# Patient Record
Sex: FEMALE | Race: BLACK OR AFRICAN AMERICAN | Employment: FULL TIME | URBAN - METROPOLITAN AREA
[De-identification: names, ages, dates, MRNs, and addresses within clinical notes are randomized per-mention and may not be internally consistent; named-entity substitution may affect disease eponyms.]

---

## 2018-07-05 ENCOUNTER — HOSPITAL ENCOUNTER (OUTPATIENT)
Dept: MAMMOGRAPHY | Age: 58
End: 2018-07-05
Payer: COMMERCIAL

## 2018-07-05 ENCOUNTER — HOSPITAL ENCOUNTER (OUTPATIENT)
Dept: MAMMOGRAPHY | Age: 58
Discharge: HOME | End: 2018-07-07
Payer: COMMERCIAL

## 2018-07-05 ENCOUNTER — HOSPITAL ENCOUNTER (OUTPATIENT)
Dept: ULTRASOUND IMAGING | Age: 58
Discharge: HOME | End: 2018-07-05
Payer: COMMERCIAL

## 2018-07-05 DIAGNOSIS — N63.20 BREAST MASS, LEFT: ICD-10-CM

## 2018-07-05 DIAGNOSIS — N63.10 BREAST MASS, RIGHT: ICD-10-CM

## 2018-07-05 DIAGNOSIS — N63.20 LUMP OF LEFT BREAST: ICD-10-CM

## 2018-07-05 PROCEDURE — 77066 DX MAMMO INCL CAD BI: CPT

## 2018-07-05 PROCEDURE — 76642 ULTRASOUND BREAST LIMITED: CPT

## 2019-05-02 ENCOUNTER — INITIAL CONSULT (OUTPATIENT)
Dept: NEUROSURGERY | Age: 59
End: 2019-05-02
Payer: COMMERCIAL

## 2019-05-02 VITALS
DIASTOLIC BLOOD PRESSURE: 64 MMHG | WEIGHT: 293 LBS | HEIGHT: 62 IN | SYSTOLIC BLOOD PRESSURE: 139 MMHG | HEART RATE: 87 BPM | BODY MASS INDEX: 53.92 KG/M2

## 2019-05-02 DIAGNOSIS — M54.16 LUMBAR RADICULOPATHY: Primary | ICD-10-CM

## 2019-05-02 PROCEDURE — G8417 CALC BMI ABV UP PARAM F/U: HCPCS | Performed by: PHYSICIAN ASSISTANT

## 2019-05-02 PROCEDURE — G8427 DOCREV CUR MEDS BY ELIG CLIN: HCPCS | Performed by: PHYSICIAN ASSISTANT

## 2019-05-02 PROCEDURE — 99203 OFFICE O/P NEW LOW 30 MIN: CPT | Performed by: PHYSICIAN ASSISTANT

## 2019-05-02 PROCEDURE — 3017F COLORECTAL CA SCREEN DOC REV: CPT | Performed by: PHYSICIAN ASSISTANT

## 2019-05-02 PROCEDURE — 1036F TOBACCO NON-USER: CPT | Performed by: PHYSICIAN ASSISTANT

## 2019-05-02 RX ORDER — LEVOTHYROXINE SODIUM 0.03 MG/1
50 TABLET ORAL DAILY
COMMUNITY

## 2019-05-02 RX ORDER — LIDOCAINE 50 MG/G
1 PATCH TOPICAL DAILY
COMMUNITY

## 2019-05-02 RX ORDER — ALLOPURINOL 100 MG/1
200 TABLET ORAL DAILY
COMMUNITY

## 2019-05-02 RX ORDER — ALBUTEROL SULFATE 90 UG/1
2 AEROSOL, METERED RESPIRATORY (INHALATION) PRN
COMMUNITY

## 2019-05-02 RX ORDER — PANTOPRAZOLE SODIUM 40 MG/1
40 TABLET, DELAYED RELEASE ORAL 2 TIMES DAILY
COMMUNITY

## 2019-05-02 ASSESSMENT — ENCOUNTER SYMPTOMS
EYES NEGATIVE: 1
ALLERGIC/IMMUNOLOGIC NEGATIVE: 1
BACK PAIN: 1
RESPIRATORY NEGATIVE: 1
GASTROINTESTINAL NEGATIVE: 1

## 2019-05-02 NOTE — LETTER
[facility] Neurosurgery  [facility] [address]  Phone: [phone]  Fax: [phone]    Taryn Martinez MD        May 2, 2019       Patient: Gaby Regalado   MR Number: <[id number]>   YOB: 1960   Date of Visit: 5/2/2019       Dear Dr. Nasra Drake:    Thank you for the request for consultation for Daniel Goyal to me for the evaluation of back pain. Below are the relevant portions of my assessment and plan of care. Assessment:     59year old female with severe low back and left leg pain for the past 10+ years. Lumbar MRI reveals small L4-5 and L5-S1 disc herniations. No significant stenosis, lithesis or fracture. Plan: We will order a LE EMG and lumbar x-rays. She is currently not a surgical candidate. If you have questions, please do not hesitate to call me. I look forward to following Jose Carlton along with you.     Sincerely,        JATIN Houston    CC providers:  Cindy Heller, [address] [address]  VIA Mail

## 2019-05-02 NOTE — PATIENT INSTRUCTIONS

## 2020-11-13 ENCOUNTER — TELEPHONE ENCOUNTER (OUTPATIENT)
Dept: URBAN - METROPOLITAN AREA CLINIC 35 | Facility: CLINIC | Age: 60
End: 2020-11-13

## 2020-11-27 ENCOUNTER — HOSPITAL ENCOUNTER (OUTPATIENT)
Dept: MAMMOGRAPHY | Age: 60
Discharge: HOME | End: 2020-11-29
Payer: COMMERCIAL

## 2020-11-27 PROCEDURE — 77063 BREAST TOMOSYNTHESIS BI: CPT

## 2020-12-08 ENCOUNTER — HOSPITAL ENCOUNTER (OUTPATIENT)
Dept: ULTRASOUND IMAGING | Age: 60
Discharge: HOME | End: 2020-12-08
Payer: COMMERCIAL

## 2020-12-08 PROCEDURE — 76642 ULTRASOUND BREAST LIMITED: CPT

## 2021-01-22 ENCOUNTER — TELEPHONE ENCOUNTER (OUTPATIENT)
Dept: URBAN - METROPOLITAN AREA CLINIC 35 | Facility: CLINIC | Age: 61
End: 2021-01-22

## 2021-01-25 ENCOUNTER — OFFICE VISIT (OUTPATIENT)
Dept: URBAN - METROPOLITAN AREA CLINIC 33 | Facility: CLINIC | Age: 61
End: 2021-01-25

## 2021-01-25 VITALS
SYSTOLIC BLOOD PRESSURE: 100 MMHG | HEIGHT: 63 IN | WEIGHT: 180 LBS | HEART RATE: 72 BPM | BODY MASS INDEX: 31.89 KG/M2 | DIASTOLIC BLOOD PRESSURE: 80 MMHG | OXYGEN SATURATION: 98 %

## 2021-01-25 PROBLEM — 271840007 ABNORMAL FECES: Status: ACTIVE | Noted: 2021-01-25

## 2021-01-25 RX ORDER — SODIUM PICOSULFATE, MAGNESIUM OXIDE, AND ANHYDROUS CITRIC ACID 10; 3.5; 12 MG/160ML; G/160ML; G/160ML
AS DIRECTED LIQUID ORAL
Qty: 1 KIT | OUTPATIENT
Start: 2021-01-25

## 2021-01-25 NOTE — EXAM-MIGRATED EXAMINATIONS
General Examination : Medical assistant was in room  Patient has a small umbilical hernia.;     GENERAL APPEARANCE: - alert, in no acute distress, well developed, well nourished;   ORAL CAVITY: - mucosa moist;   THROAT: - Mallampati classII;   HEART: - no murmurs, regular rate and rhythm, S1, S2 normal;   LUNGS: - clear to auscultation bilaterally, good air movement, no wheezes, rales, rhonchi;   ABDOMEN: - bowel sounds present, no masses palpable, no organomegaly , no rebound tenderness, soft, nontender, nondistended;

## 2021-01-25 NOTE — HPI-MIGRATED HPI
;     Positive Occult Blood Test :                           60 year old female patient presents for POSTIVE FOBT  consult.. Patient admits this will be first colonoscopy. Patient denies family hx of colon cancer. Patient denies family hx of colon polyps. Patient admits normal bowel habits at this time. Patient admits to occasional constipation. She admits that Milk and Magnesia helps relieve her symptoms. Patient denies any current symptoms of rectal bleeding, melena or mucus. Patient denies any concerns at this time.   Outside Labs: 09/29/2020)  FOBT is POSITIVE.  Lipid Panel :  Cholester,ol total is HIGH at 333 Triglycerides is HIGH at 346 HDL is LOW at 43  LDL is HIGH at 231    MRI Abdomen : (12/14/2020) IMPRESSION: Several simple cysts and proteinaceous cysts of each kidney as decsribed which are stable since comparison study.  However a posteroior superior left renal likely proteinaceous or hemorrhagic region shows stable size but slight enhancement . Short term follow up is suggested with in 2-3 months .   Resolved right sided hydronephrosis and hydroureter since compalrison study.  Moderate periumbilical fatty hernia without strangulation or bowel involvement. ;

## 2021-02-02 ENCOUNTER — OFFICE VISIT (OUTPATIENT)
Dept: URBAN - METROPOLITAN AREA SURGERY CENTER 8 | Facility: SURGERY CENTER | Age: 61
End: 2021-02-02

## 2021-02-15 ENCOUNTER — DASHBOARD ENCOUNTERS (OUTPATIENT)
Age: 61
End: 2021-02-15

## 2021-02-16 ENCOUNTER — OFFICE VISIT (OUTPATIENT)
Dept: URBAN - METROPOLITAN AREA CLINIC 33 | Facility: CLINIC | Age: 61
End: 2021-02-16

## 2021-12-30 ENCOUNTER — HOSPITAL ENCOUNTER (OUTPATIENT)
Dept: MAMMOGRAPHY | Age: 61
Discharge: HOME | End: 2022-01-01
Payer: MEDICAID

## 2021-12-30 ENCOUNTER — HOSPITAL ENCOUNTER (OUTPATIENT)
Age: 61
Discharge: HOME | End: 2021-12-30
Payer: MEDICAID

## 2021-12-30 DIAGNOSIS — Z12.31 ENCOUNTER FOR SCREENING MAMMOGRAM FOR MALIGNANT NEOPLASM OF BREAST: ICD-10-CM

## 2021-12-30 LAB
ALBUMIN SERPL-MCNC: 4.6 G/DL (ref 3.5–5.2)
ALP BLD-CCNC: 101 U/L (ref 35–104)
ALT SERPL-CCNC: 20 U/L (ref 0–32)
ANION GAP SERPL CALCULATED.3IONS-SCNC: 10 MMOL/L (ref 7–16)
AST SERPL-CCNC: 16 U/L (ref 0–31)
BILIRUB SERPL-MCNC: 0.5 MG/DL (ref 0–1.2)
BUN BLDV-MCNC: 18 MG/DL (ref 6–23)
CALCIUM SERPL-MCNC: 9.6 MG/DL (ref 8.6–10.2)
CHLORIDE BLD-SCNC: 101 MMOL/L (ref 98–107)
CHOLESTEROL, FASTING: 164 MG/DL (ref 0–199)
CO2: 29 MMOL/L (ref 22–29)
CREAT SERPL-MCNC: 0.8 MG/DL (ref 0.5–1)
GFR AFRICAN AMERICAN: >60
GFR NON-AFRICAN AMERICAN: >60 ML/MIN/1.73
GLUCOSE FASTING: 115 MG/DL (ref 74–99)
HBA1C MFR BLD: 6.4 % (ref 4–5.6)
HCT VFR BLD CALC: 39 % (ref 34–48)
HDLC SERPL-MCNC: 76 MG/DL
HEMOGLOBIN: 11.5 G/DL (ref 11.5–15.5)
LDL CHOLESTEROL CALCULATED: 75 MG/DL (ref 0–99)
MCH RBC QN AUTO: 24.5 PG (ref 26–35)
MCHC RBC AUTO-ENTMCNC: 29.5 % (ref 32–34.5)
MCV RBC AUTO: 83.2 FL (ref 80–99.9)
PDW BLD-RTO: 15.2 FL (ref 11.5–15)
PLATELET # BLD: 249 E9/L (ref 130–450)
PMV BLD AUTO: 12.1 FL (ref 7–12)
POTASSIUM SERPL-SCNC: 4.7 MMOL/L (ref 3.5–5)
RBC # BLD: 4.69 E12/L (ref 3.5–5.5)
SODIUM BLD-SCNC: 140 MMOL/L (ref 132–146)
TOTAL PROTEIN: 8.1 G/DL (ref 6.4–8.3)
TRIGLYCERIDE, FASTING: 63 MG/DL (ref 0–149)
TSH SERPL DL<=0.05 MIU/L-ACNC: 3.19 UIU/ML (ref 0.27–4.2)
VLDLC SERPL CALC-MCNC: 13 MG/DL
WBC # BLD: 6.8 E9/L (ref 4.5–11.5)

## 2021-12-30 PROCEDURE — 80061 LIPID PANEL: CPT

## 2021-12-30 PROCEDURE — 80053 COMPREHEN METABOLIC PANEL: CPT

## 2021-12-30 PROCEDURE — 83036 HEMOGLOBIN GLYCOSYLATED A1C: CPT

## 2021-12-30 PROCEDURE — 77063 BREAST TOMOSYNTHESIS BI: CPT

## 2021-12-30 PROCEDURE — 36415 COLL VENOUS BLD VENIPUNCTURE: CPT

## 2021-12-30 PROCEDURE — 84443 ASSAY THYROID STIM HORMONE: CPT

## 2021-12-30 PROCEDURE — 85027 COMPLETE CBC AUTOMATED: CPT

## 2022-09-15 ENCOUNTER — INITIAL CONSULT (OUTPATIENT)
Dept: BARIATRICS/WEIGHT MGMT | Age: 62
End: 2022-09-15
Payer: MEDICARE

## 2022-09-15 ENCOUNTER — TELEPHONE (OUTPATIENT)
Dept: BARIATRICS/WEIGHT MGMT | Age: 62
End: 2022-09-15

## 2022-09-15 VITALS
HEART RATE: 73 BPM | WEIGHT: 293 LBS | HEIGHT: 62 IN | DIASTOLIC BLOOD PRESSURE: 83 MMHG | TEMPERATURE: 97.2 F | BODY MASS INDEX: 53.92 KG/M2 | SYSTOLIC BLOOD PRESSURE: 142 MMHG | RESPIRATION RATE: 20 BRPM

## 2022-09-15 DIAGNOSIS — E66.01 MORBID OBESITY DUE TO EXCESS CALORIES (HCC): Primary | ICD-10-CM

## 2022-09-15 DIAGNOSIS — E66.01 MORBID OBESITY (HCC): ICD-10-CM

## 2022-09-15 DIAGNOSIS — K21.9 GASTROESOPHAGEAL REFLUX DISEASE WITHOUT ESOPHAGITIS: ICD-10-CM

## 2022-09-15 PROBLEM — E11.9 DM (DIABETES MELLITUS) (HCC): Status: ACTIVE | Noted: 2022-09-15

## 2022-09-15 PROBLEM — J45.909 ASTHMA: Status: ACTIVE | Noted: 2022-09-15

## 2022-09-15 PROBLEM — I27.20 PULMONARY HYPERTENSION (HCC): Status: ACTIVE | Noted: 2022-09-15

## 2022-09-15 PROBLEM — M19.90 ARTHRITIS: Status: ACTIVE | Noted: 2022-09-15

## 2022-09-15 PROBLEM — I10 HTN (HYPERTENSION): Status: ACTIVE | Noted: 2022-09-15

## 2022-09-15 PROCEDURE — 99204 OFFICE O/P NEW MOD 45 MIN: CPT | Performed by: SURGERY

## 2022-09-15 PROCEDURE — 99202 OFFICE O/P NEW SF 15 MIN: CPT

## 2022-09-15 RX ORDER — SODIUM CHLORIDE, SODIUM LACTATE, POTASSIUM CHLORIDE, CALCIUM CHLORIDE 600; 310; 30; 20 MG/100ML; MG/100ML; MG/100ML; MG/100ML
INJECTION, SOLUTION INTRAVENOUS CONTINUOUS
Status: CANCELLED | OUTPATIENT
Start: 2022-09-15

## 2022-09-15 NOTE — TELEPHONE ENCOUNTER
Prior Authorization Form  DEMOGRAPHICS:    Patient Name:  Sheila Lung  Patient :  1960            Insurance:  Payor: Jayson Drummond / Plan: Sukhdev Pruitt ESSENTIAL/PLUS / Product Type: *No Product type* /   Insurance ID Number:    Payer/Plan Subscr  Sex Relation Sub. Ins. ID Effective Group Num   1. BCBS MEDICAREDebara Dux 1960 Female Self [id number]                                    [address]   2.  [place] -* BLAKE DENT 1960 Female Self [id number]                                    [address]         DIAGNOSIS & PROCEDURE:    Procedure/Operation: egd           CPT Code: 80700    Diagnosis:  gerd    ICD10 Code: k21.9    Location:  [facility]    Surgeon:  Priscila Irby INFORMATION:    Date: 10/17/22    Time:               Anesthesia:  MAC/TIVA                                                       Status:  Outpatient        Special Comments:         Electronically signed by Jeremy Whitney MA on 9/15/2022 at 11:00 AM

## 2022-09-15 NOTE — PROGRESS NOTES
Shashank Gonzalez  9/15/2022  [facility]    Initial Evaluation  History and Physical   EGD       CHIEF COMPLAINT: Morbid obesity, malnutrition, Type 2 Diabetes Mellitus, Hypertension, Asthma, Obstructive Sleep Apnea, and GERD    HISTORY OF PRESENT ILLNESS: Shashank Gonzalez is a morbidly-obese 59 y.o.  female, who weighs 300 lb (136.1 kg). She is 157 pounds over her ideal body weight. The Body mass index is 54.87 kg/m². She has multiple medical problems aggravated by her obesity. She wishes to have bariatric surgery so that she can lose a large amount of weight and keep the weight off. I have met with [facility] in the [facility] where we discussed the surgery in great detail and went over the risks and benefits. She has watched our informational video so she understands all of the extensive risks involved. She states that she understands all of the risks and wishes to proceed with the evaluation. Weight:  300 lb 9/15/2022  initial    Past medical history:     Cervical radiculopathy     DDD (degenerative disc disease), lumbar     Morbid obesity ([address]     Arthritis     Asthma     DM (diabetes mellitus) ([address]     HTN (hypertension)     Pulmonary hypertension ([address]    Past Surgical History:   Procedure Laterality Date    APPENDECTOMY  1985    CATARACT REMOVAL Bilateral 2017    [address] (CERVIX STATUS UNKNOWN)  2017    JOINT REPLACEMENT Left 2009    knee    NERVE BLOCK N/A 12/03/2014    cervical #1    NERVE BLOCK N/A 12/10/2014    cervical epidural #2    NERVE BLOCK  12/17/2014    cerical epidural #3      Allergies: Asa [aspirin], Ibuprofen, Norco [hydrocodone-acetaminophen], and Ultram [tramadol]     Home Medications  Prior to Visit Medications    Medication Sig Taking? Authorizing Provider   lidocaine (LIDODERM) 5 % Place 1 patch onto the skin daily 12 hours on, 12 hours off.  Yes Historical Provider, MD 142/83, pulse 73, temperature 97.2 °F (36.2 °C), temperature source Temporal, resp. rate 20, height 5' 2\" (1.575 m), weight 300 lb (136.1 kg). General appearance: alert, appears stated age and cooperative, does not ambulate easily  Head: Normocephalic, without obvious abnormality, atraumatic  Eyes: PERRL  Ears/mouth/throat:  Ears clear, mouth normal, throat no redness  Neck: no adenopathy, no JVD, supple, symmetrical, trachea midline and thyroid not enlarged  Lungs: clear to auscultation bilaterally  Heart: regular rate and rhythm  Abdomen: soft, non-tender; bowel sounds normal; no masses,  no organomegaly  Extremities: extremities normal, atraumatic, no cyanosis or edema  Skin: no open wounds    Assessment:  Morbid obesity with inability to keep the weight off with diet and exercise. She is interested in Laparoscopic Deisy-en- Y Gastric Bypass or Sleeve Gastrectomy. We discussed that our goal is to ameliorate the medical problems and not to obtain a specific body mass index. She understands the risks and benefits, and wishes to proceed with the evaluation. Plan:  EGD to check for hiatal hernias, ulcers and H. Pylori. Psych and dietary evaluation, medical and cardiac clearance. These patients often have vitamin deficiencies so I will do screening labs for malnutrition while we wait for insurance approval for the surgery.     Physician Signature: Electronically signed by Dr. Aisha Jiménez MD    Send copy of H&P to PCP, Chaim Fallon MD

## 2022-09-15 NOTE — PATIENT INSTRUCTIONS
What is the next step to proceed with weight loss surgery? Please be aware that any co-pays or deductibles may be requested prior to testing and / or procedures. You will need to schedule a psychological evaluation for weight loss surgery. Patients will be required to complete all psychological testing as required by the mental health provider. Patients must also follow all of the provider's recommendations before weight loss surgery can be scheduled. The evaluation must be done a standard way for weight loss surgery. We strongly recommend that you contact one of our preferred providers listed below to arrange this:      Kirk Wright, [facility]  [address] [place], [place]   [phone]    [facility] and [address]   [phone]    Dr. Benjamin Alfaro, PhD    [address] [place], [place]    [phone]      You will also need to plan on attending a 2 hour nutrition class at the [facility] prior to your surgery. We will schedule this for you when we schedule your surgery. Please remember to have your labs drawn 10 days prior to your first scheduled dietary appointment. Please remember, that while we will submit your case to insurance for surgery authorization, it is your responsibility to know if your plan covers weight loss surgery and keep up-to-date with changes to your insurance coverage. We will do everything possible to help you get approved for weight loss surgery, but cannot guarantee an approval.     Please note that you will not be submitted to your insurance company until all pre-operative testing requirements are met.

## 2022-09-27 ENCOUNTER — TELEPHONE (OUTPATIENT)
Dept: BARIATRICS/WEIGHT MGMT | Age: 62
End: 2022-09-27

## 2022-09-27 NOTE — TELEPHONE ENCOUNTER
SW returned call to PT but PT was unavailable. SW could not leave a message as v-mailbox  is  full.     DADA Griffiths

## 2022-10-14 ENCOUNTER — ANESTHESIA EVENT (OUTPATIENT)
Dept: ENDOSCOPY | Age: 62
End: 2022-10-14
Payer: MEDICARE

## 2022-10-14 NOTE — PROGRESS NOTES
[facility]                                                                                                                    PRE OP INSTRUCTIONS FOR  Jesse Paul        Date: 10/14/2022    Date of surgery: 10/17/22   Arrival Time: Hospital will call you between 5pm and 7pm with your final arrival time for surgery    Do not eat or drink anything after midnight prior to surgery. This includes no water, chewing gum, mints or ice chips. Take the following medications with a small sip of water on the morning of Surgery: Synthroid, Metoprolol     Diabetics may take evening dose of insulin but none after midnight. If you feel symptomatic or low blood sugar morning of surgery drink 1-2 ounces of apple juice only. Aspirin, Ibuprofen, Advil, Naproxen, Vitamin E and other Anti-inflammatory products should be stopped  before surgery  as directed by your physician. Take Tylenol only unless instructed otherwise by your surgeon. Check with your Doctor regarding stopping Plavix, Coumadin, Lovenox, Eliquis, Effient, or other blood thinners. Do not smoke,use illicit drugs and do not drink any alcoholic beverages 24 hours prior to surgery. You may brush your teeth the morning of surgery. DO NOT SWALLOW WATER    You MUST make arrangements for a responsible adult to take you home after your surgery. You will not be allowed to leave alone or drive yourself home. It is strongly suggested someone stay with you the first 24 hrs. Your surgery will be cancelled if you do not have a ride home. PEDIATRIC PATIENTS ONLY:  A parent/legal guardian must accompany a child scheduled for surgery and plan to stay at the hospital until the child is discharged. Please do not bring other children with you.     Please wear simple, loose fitting clothing to the hospital.  Giovanny Martinez not bring valuables (money, credit cards, checkbooks, etc.) Do not wear any makeup (including no eye makeup) or nail polish on your fingers or toes. DO NOT wear any jewelry or piercings on day of surgery. All body piercing jewelry must be removed. Shower the night before surgery with _x__Antibacterial soap /MARCUS WIPES________    TOTAL JOINT REPLACEMENT/HYSTERECTOMY PATIENTS ONLY---Remember to bring Blood Bank bracelet to the hospital on the day of surgery. If you have a Living Will and Durable Power of  for Healthcare, please bring in a copy. If appropriate bring crutches, inspirex, WALKER, CANE etc... Notify your Surgeon if you develop any illness between now and surgery time, cough, cold, fever, sore throat, nausea, vomiting, etc.  Please notify your surgeon if you experience dizziness, shortness of breath or blurred vision between now & the time of your surgery. If you have ___dentures, they will be removed before going to the OR; we will provide you a container. If you wear ___contact lenses or ___glasses, they will be removed; please bring a case for them. To provide excellent care visitors will be limited to 2 in the room at any given time. Please bring picture ID and insurance card. Sleep apnea patients need to bring CPAP AND SETTINGS to hospital on day of surgery. During flu season no children under the age of 15 are permitted in the hospital for the safety of all patients. Other                   Please call AMBULATORY CARE if you have any further questions.    [facility]     [address]

## 2022-10-17 ENCOUNTER — HOSPITAL ENCOUNTER (OUTPATIENT)
Age: 62
Setting detail: OUTPATIENT SURGERY
Discharge: HOME | End: 2022-10-17
Attending: SURGERY | Admitting: SURGERY
Payer: MEDICARE

## 2022-10-17 ENCOUNTER — ANESTHESIA (OUTPATIENT)
Dept: ENDOSCOPY | Age: 62
End: 2022-10-17
Payer: MEDICARE

## 2022-10-17 VITALS
OXYGEN SATURATION: 98 % | SYSTOLIC BLOOD PRESSURE: 123 MMHG | DIASTOLIC BLOOD PRESSURE: 63 MMHG | HEIGHT: 62 IN | HEART RATE: 71 BPM | TEMPERATURE: 97.1 F | BODY MASS INDEX: 53.92 KG/M2 | WEIGHT: 293 LBS | RESPIRATION RATE: 20 BRPM

## 2022-10-17 DIAGNOSIS — E66.01 MORBID OBESITY DUE TO EXCESS CALORIES (HCC): ICD-10-CM

## 2022-10-17 DIAGNOSIS — K21.00 GASTROESOPHAGEAL REFLUX DISEASE WITH ESOPHAGITIS, UNSPECIFIED WHETHER HEMORRHAGE: ICD-10-CM

## 2022-10-17 LAB
ALBUMIN SERPL-MCNC: 4.2 G/DL (ref 3.5–5.2)
ALP BLD-CCNC: 101 U/L (ref 35–104)
ALT SERPL-CCNC: 14 U/L (ref 0–32)
ANION GAP SERPL CALCULATED.3IONS-SCNC: 9 MMOL/L (ref 7–16)
AST SERPL-CCNC: 15 U/L (ref 0–31)
BILIRUB SERPL-MCNC: 0.4 MG/DL (ref 0–1.2)
BUN BLDV-MCNC: 15 MG/DL (ref 6–23)
CALCIUM SERPL-MCNC: 9 MG/DL (ref 8.6–10.2)
CHLORIDE BLD-SCNC: 101 MMOL/L (ref 98–107)
CHOLESTEROL, TOTAL: 168 MG/DL (ref 0–199)
CO2: 29 MMOL/L (ref 22–29)
CREAT SERPL-MCNC: 0.8 MG/DL (ref 0.5–1)
FERRITIN: 77 NG/ML
FOLATE: 13.6 NG/ML (ref 4.8–24.2)
GFR AFRICAN AMERICAN: >60
GFR NON-AFRICAN AMERICAN: >60 ML/MIN/1.73
GLUCOSE BLD-MCNC: 113 MG/DL (ref 74–99)
HBA1C MFR BLD: 6.5 % (ref 4–5.6)
HCT VFR BLD CALC: 37.1 % (ref 34–48)
HEMOGLOBIN: 10.9 G/DL (ref 11.5–15.5)
MCH RBC QN AUTO: 24.2 PG (ref 26–35)
MCHC RBC AUTO-ENTMCNC: 29.4 % (ref 32–34.5)
MCV RBC AUTO: 82.3 FL (ref 80–99.9)
METER GLUCOSE: 84 MG/DL (ref 74–99)
PDW BLD-RTO: 14.9 FL (ref 11.5–15)
PLATELET # BLD: 224 E9/L (ref 130–450)
PMV BLD AUTO: 12.6 FL (ref 7–12)
POTASSIUM SERPL-SCNC: 4 MMOL/L (ref 3.5–5)
PREALBUMIN: 17 MG/DL (ref 20–40)
RBC # BLD: 4.51 E12/L (ref 3.5–5.5)
SODIUM BLD-SCNC: 139 MMOL/L (ref 132–146)
TOTAL PROTEIN: 7.4 G/DL (ref 6.4–8.3)
TRIGL SERPL-MCNC: 77 MG/DL (ref 0–149)
VITAMIN B-12: 667 PG/ML (ref 211–946)
WBC # BLD: 5.5 E9/L (ref 4.5–11.5)

## 2022-10-17 PROCEDURE — 84134 ASSAY OF PREALBUMIN: CPT

## 2022-10-17 PROCEDURE — 84478 ASSAY OF TRIGLYCERIDES: CPT

## 2022-10-17 PROCEDURE — 82728 ASSAY OF FERRITIN: CPT

## 2022-10-17 PROCEDURE — 6360000002 HC RX W HCPCS: Performed by: NURSE ANESTHETIST, CERTIFIED REGISTERED

## 2022-10-17 PROCEDURE — 82465 ASSAY BLD/SERUM CHOLESTEROL: CPT

## 2022-10-17 PROCEDURE — 85027 COMPLETE CBC AUTOMATED: CPT

## 2022-10-17 PROCEDURE — 76937 US GUIDE VASCULAR ACCESS: CPT

## 2022-10-17 PROCEDURE — 80053 COMPREHEN METABOLIC PANEL: CPT

## 2022-10-17 PROCEDURE — 3700000000 HC ANESTHESIA ATTENDED CARE: Performed by: SURGERY

## 2022-10-17 PROCEDURE — 83036 HEMOGLOBIN GLYCOSYLATED A1C: CPT

## 2022-10-17 PROCEDURE — 82962 GLUCOSE BLOOD TEST: CPT

## 2022-10-17 PROCEDURE — 87081 CULTURE SCREEN ONLY: CPT

## 2022-10-17 PROCEDURE — 2580000003 HC RX 258: Performed by: SURGERY

## 2022-10-17 PROCEDURE — 7100000010 HC PHASE II RECOVERY - FIRST 15 MIN: Performed by: SURGERY

## 2022-10-17 PROCEDURE — 82607 VITAMIN B-12: CPT

## 2022-10-17 PROCEDURE — 36415 COLL VENOUS BLD VENIPUNCTURE: CPT

## 2022-10-17 PROCEDURE — 84425 ASSAY OF VITAMIN B-1: CPT

## 2022-10-17 PROCEDURE — 3609012400 HC EGD TRANSORAL BIOPSY SINGLE/MULTIPLE: Performed by: SURGERY

## 2022-10-17 PROCEDURE — 84630 ASSAY OF ZINC: CPT

## 2022-10-17 PROCEDURE — 82746 ASSAY OF FOLIC ACID SERUM: CPT

## 2022-10-17 PROCEDURE — 2709999900 HC NON-CHARGEABLE SUPPLY: Performed by: SURGERY

## 2022-10-17 PROCEDURE — 7100000011 HC PHASE II RECOVERY - ADDTL 15 MIN: Performed by: SURGERY

## 2022-10-17 PROCEDURE — 43239 EGD BIOPSY SINGLE/MULTIPLE: CPT | Performed by: SURGERY

## 2022-10-17 RX ORDER — SODIUM CHLORIDE, SODIUM LACTATE, POTASSIUM CHLORIDE, CALCIUM CHLORIDE 600; 310; 30; 20 MG/100ML; MG/100ML; MG/100ML; MG/100ML
INJECTION, SOLUTION INTRAVENOUS CONTINUOUS
Status: DISCONTINUED | OUTPATIENT
Start: 2022-10-17 | End: 2022-10-17 | Stop reason: HOSPADM

## 2022-10-17 RX ORDER — PROPOFOL 10 MG/ML
INJECTION, EMULSION INTRAVENOUS PRN
Status: DISCONTINUED | OUTPATIENT
Start: 2022-10-17 | End: 2022-10-17 | Stop reason: SDUPTHER

## 2022-10-17 RX ADMIN — SODIUM CHLORIDE, POTASSIUM CHLORIDE, SODIUM LACTATE AND CALCIUM CHLORIDE: 600; 310; 30; 20 INJECTION, SOLUTION INTRAVENOUS at 14:25

## 2022-10-17 RX ADMIN — PROPOFOL 150 MCG/KG/MIN: 10 INJECTION, EMULSION INTRAVENOUS at 14:33

## 2022-10-17 ASSESSMENT — PAIN DESCRIPTION - DESCRIPTORS: DESCRIPTORS: ACHING;SORE

## 2022-10-17 ASSESSMENT — PAIN SCALES - GENERAL: PAINLEVEL_OUTOF10: 0

## 2022-10-17 ASSESSMENT — PAIN - FUNCTIONAL ASSESSMENT: PAIN_FUNCTIONAL_ASSESSMENT: 0-10

## 2022-10-17 ASSESSMENT — LIFESTYLE VARIABLES: SMOKING_STATUS: 0

## 2022-10-17 NOTE — ANESTHESIA PRE PROCEDURE
Department of Anesthesiology  Preprocedure Note       Name:  Tre Ambriz   Age:  59 y.o.  :  1960                                          MRN:  [id number]         Date:  10/17/2022      Surgeon: Ramón Licona):  Shaq Mason MD    Procedure: Procedure(s):  EGD ESOPHAGOGASTRODUODENOSCOPY    Medications prior to admission:   Prior to Admission medications    Medication Sig Start Date End Date Taking? Authorizing Provider   lidocaine (LIDODERM) 5 % Place 1 patch onto the skin daily 12 hours on, 12 hours off. Historical Provider, MD   pantoprazole (PROTONIX) 40 MG tablet Take 40 mg by mouth 2 times daily    Historical Provider, MD   levothyroxine (SYNTHROID) 25 MCG tablet Take 50 mcg by mouth Daily    Historical Provider, MD   fluticasone 200 MCG/ACT AEPB Inhale 1 puff into the lungs daily  Patient not taking: Reported on 10/14/2022    Historical Provider, MD   albuterol sulfate  (90 Base) MCG/ACT inhaler Inhale 2 puffs into the lungs as needed for Wheezing    Historical Provider, MD   metFORMIN (GLUCOPHAGE) 500 MG tablet Take 500 mg by mouth 2 times daily (with meals)    Historical Provider, MD   allopurinol (ZYLOPRIM) 100 MG tablet Take 200 mg by mouth daily    Historical Provider, MD   hydrochlorothiazide (MICROZIDE) 12.5 MG capsule Take 12.5 mg by mouth daily. Historical Provider, MD   oxyCODONE-acetaminophen (PERCOCET) 5-325 MG per tablet Take 1 tablet by mouth every 6 hours as needed for Pain. Historical Provider, MD   montelukast (SINGULAIR) 10 MG tablet Take 10 mg by mouth nightly. Historical Provider, MD   metoprolol (LOPRESSOR) 25 MG tablet Take 25 mg by mouth 2 times daily. Historical Provider, MD   cyclobenzaprine (FLEXERIL) 10 MG tablet Take 10 mg by mouth 3 times daily as needed for Muscle spasms. Patient not taking: Reported on 10/14/2022    Historical Provider, MD   diclofenac sodium (VOLTAREN) 1 % GEL Apply 2 g topically 2 times daily.     Historical Provider, MD Current medications:    Current Facility-Administered Medications   Medication Dose Route Frequency Provider Last Rate Last Admin    lactated ringers infusion   IntraVENous Continuous Zion Vega MD           Allergies: Allergies   Allergen Reactions    Asa [Aspirin] Hives    Ibuprofen Other (See Comments)     Chest pain    Norco [Hydrocodone-Acetaminophen] Itching    Ultram [Tramadol] Itching       Problem List:    Patient Active Problem List   Diagnosis Code    Cervical radiculopathy M54.12    DDD (degenerative disc disease), lumbar M51.36    Morbid obesity ([address] E66.01    Arthritis M19.90    Asthma J45.909    DM (diabetes mellitus) ([address] E11.9    HTN (hypertension) I10    Pulmonary hypertension ([address] I27.20       Past Medical History:        Diagnosis Date    Arthritis     Asthma     DM (diabetes mellitus) ([address]     HTN (hypertension)     Morbid obesity due to excess calories ([address]     Obstructive sleep apnea syndrome     Pulmonary hypertension ([address]     Retention of urine        Past Surgical History:        Procedure Laterality Date    APPENDECTOMY  1985    CATARACT REMOVAL Bilateral 2017   [address] (CERVIX STATUS UNKNOWN)  2017    JOINT REPLACEMENT Left 2009    knee    NERVE BLOCK N/A 12/03/2014    cervical #1    NERVE BLOCK N/A 12/10/2014    cervical epidural #2    NERVE BLOCK  12/17/2014    cerical epidural #3       Social History:    Social History     Tobacco Use    Smoking status: Never    Smokeless tobacco: Never   Substance Use Topics    Alcohol use:  No                                Counseling given: Not Answered      Vital Signs (Current):   Vitals:    10/14/22 0822 10/17/22 1220   BP:  (!) 140/80   Pulse:  71   Resp:  20   Temp:  36 °C (96.8 °F)   TempSrc:  Temporal   SpO2:  95%   Weight: 300 lb (136.1 kg) (!) 302 lb 9.6 oz (137.3 kg)   Height:  5' 2\" (1.575 m) BP Readings from Last 3 Encounters:   10/17/22 (!) 140/80   09/15/22 (!) 142/83   05/02/19 139/64       NPO Status: Time of last liquid consumption: 0600 (sips of water with pills)                        Time of last solid consumption: 2200                        Date of last liquid consumption: 10/17/22                        Date of last solid food consumption: 10/16/22    BMI:   Wt Readings from Last 3 Encounters:   10/17/22 (!) 302 lb 9.6 oz (137.3 kg)   09/15/22 300 lb (136.1 kg)   05/02/19 296 lb (134.3 kg)     Body mass index is 55.35 kg/m². CBC:   Lab Results   Component Value Date/Time    WBC 5.5 10/17/2022 11:38 AM    RBC 4.51 10/17/2022 11:38 AM    HGB 10.9 10/17/2022 11:38 AM    HCT 37.1 10/17/2022 11:38 AM    MCV 82.3 10/17/2022 11:38 AM    RDW 14.9 10/17/2022 11:38 AM     10/17/2022 11:38 AM       CMP:   Lab Results   Component Value Date/Time     10/17/2022 11:38 AM    K 4.0 10/17/2022 11:38 AM     10/17/2022 11:38 AM    CO2 29 10/17/2022 11:38 AM    BUN 15 10/17/2022 11:38 AM    CREATININE 0.8 10/17/2022 11:38 AM    GFRAA >60 10/17/2022 11:38 AM    LABGLOM >60 10/17/2022 11:38 AM    GLUCOSE 113 10/17/2022 11:38 AM    PROT 7.4 10/17/2022 11:38 AM    CALCIUM 9.0 10/17/2022 11:38 AM    BILITOT 0.4 10/17/2022 11:38 AM    ALKPHOS 101 10/17/2022 11:38 AM    AST 15 10/17/2022 11:38 AM    ALT 14 10/17/2022 11:38 AM       POC Tests: No results for input(s): POCGLU, POCNA, POCK, POCCL, POCBUN, POCHEMO, POCHCT in the last 72 hours.     Coags: No results found for: PROTIME, INR, APTT    HCG (If Applicable): No results found for: PREGTESTUR, PREGSERUM, HCG, HCGQUANT     ABGs: No results found for: PHART, PO2ART, HNJ8YIP, OCZ6OQI, BEART, L7ZMILJP     Type & Screen (If Applicable):  No results found for: LABABO, LABRH    Drug/Infectious Status (If Applicable):  No results found for: HIV, HEPCAB    COVID-19 Screening (If Applicable): No results found for: COVID19        Anesthesia Evaluation  Patient summary reviewed and Nursing notes reviewed no history of anesthetic complications:   Airway: Mallampati: II  TM distance: >3 FB   Neck ROM: full  Mouth opening: > = 3 FB   Dental: normal exam         Pulmonary: breath sounds clear to auscultation  (+) sleep apnea: on noncompliant,  asthma: allergic asthma,                            Cardiovascular:  Exercise tolerance: poor (<4 METS),   (+) hypertension: moderate, pulmonary hypertension: moderate,         Rhythm: regular  Rate: normal                    Neuro/Psych:   (+) neuromuscular disease:,             GI/Hepatic/Renal:             Endo/Other:    (+) DiabetesType II DM, no interval change, , .                 Abdominal:             Vascular: Other Findings:           Anesthesia Plan      ASA 3       Induction: intravenous. Anesthetic plan and risks discussed with patient. Plan discussed with attending.                     ARMIN White - JERROD   10/17/2022

## 2022-10-17 NOTE — H&P
Aimee Rosenberg  10/17/2022  [facility]    History and Physical   EGD       CHIEF COMPLAINT: Morbid obesity, malnutrition, Type 2 Diabetes Mellitus, Hypertension, Asthma, Obstructive Sleep Apnea, and GERD    HISTORY OF PRESENT ILLNESS: Aimee Rosenberg is a morbidly-obese 59 y.o.  female, who weighs 300 lb (136.1 kg). She is 157 pounds over her ideal body weight. The Body mass index is 55.35 kg/m². She has multiple medical problems aggravated by her obesity. She wishes to have bariatric surgery so that she can lose a large amount of weight and keep the weight off. I have met with her in the [facility] where we discussed the surgery in great detail and went over the risks and benefits. She has watched our informational video so she understands all of the extensive risks involved. She states that she understands all of the risks and wishes to proceed with the evaluation. Weight:  300 lb 9/15/2022  initial    Past medical history:     Cervical radiculopathy     DDD (degenerative disc disease), lumbar     Morbid obesity ([address]     Arthritis     Asthma     DM (diabetes mellitus) ([address]     HTN (hypertension)     Pulmonary hypertension ([address]    Past Surgical History:   Procedure Laterality Date    APPENDECTOMY  1985    CATARACT REMOVAL Bilateral 2017    [address] (CERVIX STATUS UNKNOWN)  2017    JOINT REPLACEMENT Left 2009    knee    NERVE BLOCK N/A 12/03/2014    cervical #1    NERVE BLOCK N/A 12/10/2014    cervical epidural #2    NERVE BLOCK  12/17/2014    cerical epidural #3      Allergies: Asa [aspirin], Ibuprofen, Norco [hydrocodone-acetaminophen], and Ultram [tramadol]     Home Medications  Prior to Visit Medications    Medication Sig Taking? Authorizing Provider   lidocaine (LIDODERM) 5 % Place 1 patch onto the skin daily 12 hours on, 12 hours off.   Historical Provider, MD   pantoprazole (PROTONIX) 40 MG tablet Take 40 mg by mouth 2 times daily  Historical Provider, MD   levothyroxine (SYNTHROID) 25 MCG tablet Take 50 mcg by mouth Daily  Historical Provider, MD   fluticasone 200 MCG/ACT AEPB Inhale 1 puff into the lungs daily  Patient not taking: Reported on 10/14/2022  Historical Provider, MD   albuterol sulfate  (90 Base) MCG/ACT inhaler Inhale 2 puffs into the lungs as needed for Wheezing  Historical Provider, MD   metFORMIN (GLUCOPHAGE) 500 MG tablet Take 500 mg by mouth 2 times daily (with meals)  Historical Provider, MD   allopurinol (ZYLOPRIM) 100 MG tablet Take 200 mg by mouth daily  Historical Provider, MD   hydrochlorothiazide (MICROZIDE) 12.5 MG capsule Take 12.5 mg by mouth daily. Historical Provider, MD   oxyCODONE-acetaminophen (PERCOCET) 5-325 MG per tablet Take 1 tablet by mouth every 6 hours as needed for Pain. Historical Provider, MD   montelukast (SINGULAIR) 10 MG tablet Take 10 mg by mouth nightly. Historical Provider, MD   metoprolol (LOPRESSOR) 25 MG tablet Take 25 mg by mouth 2 times daily. Historical Provider, MD   cyclobenzaprine (FLEXERIL) 10 MG tablet Take 10 mg by mouth 3 times daily as needed for Muscle spasms. Patient not taking: Reported on 10/14/2022  Historical Provider, MD   diclofenac sodium (VOLTAREN) 1 % GEL Apply 2 g topically 2 times daily. Historical Provider, MD     Social History:   TOBACCO:   reports that she has never smoked. She has never used smokeless tobacco.  All smokers must join the free smoking cessation program and stop smoking for 3 months before having any Bariatric surgery. ETOH:    reports no history of alcohol use.    Family History   Problem Relation Age of Onset    Cancer Father     Diabetes Mother     Hypertension Mother      Review of Systems:  Psychiatric:  depression and anxiety  Respiratory: sleep apnea  Cardiovascular: htn  Gastrointestinal: gerd  Musculoskeletal: arthritis  All others reviewed, negative    Physical Exam:   VITALS: Blood pressure (!) 140/80, pulse 71, temperature 96.8 °F (36 °C), temperature source Temporal, resp. rate 20, height 5' 2\" (1.575 m), weight (!) 302 lb 9.6 oz (137.3 kg), SpO2 95 %. General appearance: alert, appears stated age and cooperative, does not ambulate easily  Head: Normocephalic, without obvious abnormality, atraumatic  Eyes: PERRL  Ears/mouth/throat:  Ears clear, mouth normal, throat no redness  Neck: no adenopathy, no JVD, supple, symmetrical, trachea midline and thyroid not enlarged  Lungs: clear to auscultation bilaterally  Heart: regular rate and rhythm  Abdomen: soft, non-tender; bowel sounds normal; no masses,  no organomegaly  Extremities: extremities normal, atraumatic, no cyanosis or edema  Skin: no open wounds    Assessment:  Morbid obesity, gerd on protonix     Plan:  EGD to check for hiatal hernias, ulcers and H. Pylori.      Physician Signature: Electronically signed by Dr. Sammy Herrera MD    Send copy of H&P to PCP, Leila Fountain MD

## 2022-10-17 NOTE — ANESTHESIA POSTPROCEDURE EVALUATION
Department of Anesthesiology  Postprocedure Note    Patient: Trinidad Goins  MRN: [id number]  YOB: 1960  Date of evaluation: 10/17/2022      Procedure Summary     Date: 10/17/22 Room / Location: [address] [place] [address] / [address] [place] [address]    Anesthesia Start: 5131 Anesthesia Stop: 5763    Procedure: EGD BIOPSY Diagnosis:       Gastroesophageal reflux disease with esophagitis, unspecified whether hemorrhage      (Gastroesophageal reflux disease with esophagitis, unspecified whether hemorrhage [K21.00])    Surgeons: Luis Carlos Holbrook MD Responsible Provider: Elidia Sheehan MD    Anesthesia Type: Not recorded ASA Status: 3          Anesthesia Type: No value filed.     Lester Phase I: Lester Score: 10    Lester Phase II: Lester Score: 10      Anesthesia Post Evaluation    Patient location during evaluation: PACU  Patient participation: complete - patient participated  Level of consciousness: awake  Airway patency: patent  Nausea & Vomiting: no nausea and no vomiting  Complications: no  Cardiovascular status: hemodynamically stable  Respiratory status: acceptable  Hydration status: stable

## 2022-10-17 NOTE — OP NOTE
[facility], [place]    Operative Report      SURGEON: Dr. Yuliana Toth MD    Surgical Assistant: Stephen Clark RN     PRE-OP DIAGNOSIS:  Patient Active Problem List:     Cervical radiculopathy     DDD (degenerative disc disease), lumbar     Morbid obesity ([facility]     Arthritis     Asthma     DM (diabetes mellitus) ([facility]     HTN (hypertension)     Pulmonary hypertension ([facility]     Gastroesophageal reflux disease with esophagitis     POSTOPERATIVE DIAGNOSES:    EGD with biopsy 10/17/2022 showed no esophagitis, no hiatal hernia, there was no gastritis, biopsy done to check for H.pylori, she does complain of acid reflux on Protonix, and the gallbladder is out. OPERATION:    EGD esophagogastroduodenoscopy    with biopsy                 [id number]     ANESTHESIA: LMAC. BLOOD LOSS: none  SPECIMEN: gastric biopsy for DELANEY    CONSENT AND INDICATIONS:  Kirk Barnes is a 59y.o. year old female who weighs 300 lb (136.1 kg) who needs to have an EGD as part of the work up for bariatric surgery. I have discussed with the patient the indication, alternatives, and the possible risks and/or complications of the planned procedure and the anesthesia methods. The patient and/or patient representative appear to understand and agree to proceed. Monitoring and Safety: Anaesthesia monitored vital signs, BP cuff, pulse oximetry, cardiac monitor and level of consciousness until recovered. Operation: The patient was placed on the table and sedated by Anaesthesia. Bite block was placed. A lubricated scope was easily passed into the upper esophagus and distal esophagus which had no esophagitis. The Z line was measured at 36 cm. The scope was passed into the stomach and down toward the pylorus. The antral mucosa was examined and there was no gastritis. Biopsy was taken to check for H. pylori.  The scope was passed through the pylorus into the duodenal bulb and distal duodenum which looked normal. The scope was pulled back to the stomach and retroflexed. There was no hiatal hernia, picture was taken. The scope was withdrawn. The patient tolerated the procedure well. Complications: none    Plan:   She may proceed with bariatric surgery. Electronically signed Dr. Isaias Ortiz M.D.

## 2022-10-17 NOTE — DISCHARGE INSTRUCTIONS
Upper GI Endoscopy: What to Expect at [address] had an upper GI endoscopy. Your doctor used a thin, lighted tube that bends to look at the inside of your esophagus, your stomach, and the first part of the small intestine, called the duodenum. After you have an endoscopy, you will stay at the hospital or clinic for 1 to 2 hours. This will allow the medicine to wear off. You will be able to go home after your doctor or nurse checks to make sure that you're not having any problems. You may have to stay overnight if you had treatment during the test. You may have a sore throat for a day or two after the test.  This care sheet gives you a general idea about what to expect after the test.  How can you care for yourself at home? Activity   Rest as much as you need to after you go home. You should be able to go back to your usual activities the day after the test.  Diet   Follow your doctor's directions for eating after the test.  Drink plenty of fluids (unless your doctor has told you not to). Medications   If you have a sore throat the day after the test, use an over-the-counter spray to numb your throat. Follow-up care is a key part of your treatment and safety. Be sure to make and go to all appointments, and call your doctor if you are having problems. It's also a good idea to know your test results and keep a list of the medicines you take. When should you call for help? Call 911 anytime you think you may need emergency care. For example, call if:    You passed out (lost consciousness). You have trouble breathing. You pass maroon or bloody stools. Call your doctor now or seek immediate medical care if:    You have pain that does not get better after your take pain medicine. You have new or worse belly pain. You have blood in your stools. You are sick to your stomach and cannot keep fluids down. You have a fever. You cannot pass stools or gas.    Watch closely for changes in your health, and be sure to contact your doctor if:    Your throat still hurts after a day or two. You do not get better as expected. Where can you learn more? Go to [email or web address] org and sign in to your [employer] account. Enter H779 in the [employer] box to learn more about \"Upper GI Endoscopy: What to Expect at Home. \"     If you do not have an account, please click on the \"Sign Up Now\" link. Current as of: June 6, 2022               Content Version: 13.4  © 7462-5410 Healthwise, Incorporated. Care instructions adapted under license by [facility] [place] ([facility]). If you have questions about a medical condition or this instruction, always ask your healthcare professional. [address] any warranty or liability for your use of this information.

## 2022-10-17 NOTE — ANESTHESIA PRE PROCEDURE
Department of Anesthesiology  Preprocedure Note       Name:  Kristyn Hu   Age:  59 y.o.  :  1960                                          MRN:  [id number]         Date:  10/17/2022      Surgeon: Asha Benavides):  Justyna Curran MD    Procedure: Procedure(s):  EGD ESOPHAGOGASTRODUODENOSCOPY    Medications prior to admission:   Prior to Admission medications    Medication Sig Start Date End Date Taking? Authorizing Provider   lidocaine (LIDODERM) 5 % Place 1 patch onto the skin daily 12 hours on, 12 hours off. Historical Provider, MD   pantoprazole (PROTONIX) 40 MG tablet Take 40 mg by mouth 2 times daily    Historical Provider, MD   levothyroxine (SYNTHROID) 25 MCG tablet Take 50 mcg by mouth Daily    Historical Provider, MD   fluticasone 200 MCG/ACT AEPB Inhale 1 puff into the lungs daily  Patient not taking: Reported on 10/14/2022    Historical Provider, MD   albuterol sulfate  (90 Base) MCG/ACT inhaler Inhale 2 puffs into the lungs as needed for Wheezing    Historical Provider, MD   metFORMIN (GLUCOPHAGE) 500 MG tablet Take 500 mg by mouth 2 times daily (with meals)    Historical Provider, MD   allopurinol (ZYLOPRIM) 100 MG tablet Take 200 mg by mouth daily    Historical Provider, MD   hydrochlorothiazide (MICROZIDE) 12.5 MG capsule Take 12.5 mg by mouth daily. Historical Provider, MD   oxyCODONE-acetaminophen (PERCOCET) 5-325 MG per tablet Take 1 tablet by mouth every 6 hours as needed for Pain. Historical Provider, MD   montelukast (SINGULAIR) 10 MG tablet Take 10 mg by mouth nightly. Historical Provider, MD   metoprolol (LOPRESSOR) 25 MG tablet Take 25 mg by mouth 2 times daily. Historical Provider, MD   cyclobenzaprine (FLEXERIL) 10 MG tablet Take 10 mg by mouth 3 times daily as needed for Muscle spasms. Patient not taking: Reported on 10/14/2022    Historical Provider, MD   diclofenac sodium (VOLTAREN) 1 % GEL Apply 2 g topically 2 times daily.     Historical Provider, MD Current medications:    No current facility-administered medications for this encounter. Allergies: Allergies   Allergen Reactions    Asa [Aspirin] Hives    Ibuprofen Other (See Comments)     Chest pain    Norco [Hydrocodone-Acetaminophen] Itching    Ultram [Tramadol] Itching       Problem List:    Patient Active Problem List   Diagnosis Code    Cervical radiculopathy M54.12    DDD (degenerative disc disease), lumbar M51.36    Morbid obesity ([address] E66.01    Arthritis M19.90    Asthma J45.909    DM (diabetes mellitus) ([address] E11.9    HTN (hypertension) I10    Pulmonary hypertension ([address] I27.20       Past Medical History:        Diagnosis Date    Arthritis     Asthma     DM (diabetes mellitus) ([address]     HTN (hypertension)     Morbid obesity due to excess calories ([address]     Obstructive sleep apnea syndrome     Pulmonary hypertension ([address]     Retention of urine        Past Surgical History:        Procedure Laterality Date    APPENDECTOMY  1985    CATARACT REMOVAL Bilateral 2017   [address] (CERVIX STATUS UNKNOWN)  2017    JOINT REPLACEMENT Left 2009    knee    NERVE BLOCK N/A 12/03/2014    cervical #1    NERVE BLOCK N/A 12/10/2014    cervical epidural #2    NERVE BLOCK  12/17/2014    cerical epidural #3       Social History:    Social History     Tobacco Use    Smoking status: Never    Smokeless tobacco: Never   Substance Use Topics    Alcohol use:  No                                Counseling given: Not Answered      Vital Signs (Current):   Vitals:    10/14/22 0822   Weight: 300 lb (136.1 kg)                                              BP Readings from Last 3 Encounters:   09/15/22 (!) 142/83   05/02/19 139/64   01/28/15 120/70       NPO Status:                                                                                 BMI:   Wt Readings from Last 3 Encounters:   10/14/22 300 lb (136.1 kg)   09/15/22 300 lb (136.1 kg)   05/02/19 296 lb (134.3 kg)     Body mass index is 54.87 kg/m². CBC:   Lab Results   Component Value Date/Time    WBC 6.8 12/30/2021 12:04 PM    RBC 4.69 12/30/2021 12:04 PM    HGB 11.5 12/30/2021 12:04 PM    HCT 39.0 12/30/2021 12:04 PM    MCV 83.2 12/30/2021 12:04 PM    RDW 15.2 12/30/2021 12:04 PM     12/30/2021 12:04 PM       CMP:   Lab Results   Component Value Date/Time     12/30/2021 12:04 PM    K 4.7 12/30/2021 12:04 PM     12/30/2021 12:04 PM    CO2 29 12/30/2021 12:04 PM    BUN 18 12/30/2021 12:04 PM    CREATININE 0.8 12/30/2021 12:04 PM    GFRAA >60 12/30/2021 12:04 PM    LABGLOM >60 12/30/2021 12:04 PM    PROT 8.1 12/30/2021 12:04 PM    CALCIUM 9.6 12/30/2021 12:04 PM    BILITOT 0.5 12/30/2021 12:04 PM    ALKPHOS 101 12/30/2021 12:04 PM    AST 16 12/30/2021 12:04 PM    ALT 20 12/30/2021 12:04 PM       POC Tests: No results for input(s): POCGLU, POCNA, POCK, POCCL, POCBUN, POCHEMO, POCHCT in the last 72 hours.     Coags: No results found for: PROTIME, INR, APTT    HCG (If Applicable): No results found for: PREGTESTUR, PREGSERUM, HCG, HCGQUANT     ABGs: No results found for: PHART, PO2ART, HTR1PNJ, UPH4GXI, BEART, L7MADCTB     Type & Screen (If Applicable):  No results found for: LABABO, LABRH    Drug/Infectious Status (If Applicable):  No results found for: HIV, HEPCAB    COVID-19 Screening (If Applicable): No results found for: COVID19        Anesthesia Evaluation  Patient summary reviewed no history of anesthetic complications:   Airway: Mallampati: II  TM distance: >3 FB   Neck ROM: full  Mouth opening: > = 3 FB   Dental: normal exam         Pulmonary: breath sounds clear to auscultation  (+) sleep apnea: on CPAP,  asthma:     (-) not a current smoker                           Cardiovascular:    (+) hypertension:,         Rhythm: regular  Rate: normal                    Neuro/Psych:   (+) neuromuscular disease:,              ROS comment: Cervical radiculopathy  DDD

## 2022-10-19 LAB — CLOTEST: NORMAL

## 2022-10-20 LAB — ZINC: 93.8 UG/DL (ref 60–120)

## 2022-10-24 LAB — VITAMIN B1 WHOLE BLOOD: 94 NMOL/L (ref 70–180)

## 2022-10-25 ENCOUNTER — INITIAL CONSULT (OUTPATIENT)
Dept: BARIATRICS/WEIGHT MGMT | Age: 62
End: 2022-10-25

## 2022-10-25 VITALS — HEIGHT: 62 IN | BODY MASS INDEX: 53.92 KG/M2 | WEIGHT: 293 LBS

## 2022-10-25 DIAGNOSIS — Z71.3 NUTRITIONAL COUNSELING: ICD-10-CM

## 2022-10-25 DIAGNOSIS — Z00.8 NUTRITIONAL ASSESSMENT: Primary | ICD-10-CM

## 2022-10-25 PROCEDURE — 99999 PR OFFICE/OUTPT VISIT,PROCEDURE ONLY: CPT | Performed by: SURGERY

## 2022-10-25 NOTE — PROGRESS NOTES
[address] [facility]:  Initial Nutrition Consultation    Today's Date: 10/25/2022  Patients Name:Yanet Jones     Height: 5' 2\" (1.575 m)   Weight: (!) 301 lb (136.5 kg)   IBW: 143 lbs  %IBW: 210%  Excess Weight: 158 lbs  BMI: Body mass index is 55.05 kg/m². Subjective Information:   Patient has tried multiple means of weight loss including diet and exercise in the past and has been unable to maintain a healthy weight. Pt states he / she has tried the following  - Cabbage Soup Diet, Weight Watchers, Portion Controlled Diet and Walking. Patients overall goal is to be able to lose weight with diet and exercise by changing lifestyle, habits and maintain a healthy weight for a lifetime. Are your currently Diabetic  - Yes  Type 2 Diabetic  - Yes  Medication to Control Diabetes  - Januvia and Metformin    Most successful diet in the past? Walking Daily  Length of time patient was on the diet listed above? 1989  Weight lost on the diet listed above? 50 lbs  Patient stated he / she maintained his / her weight for the following time? 15 years    Patient takes the following vitamins, minerals and dietary supplements? No - I do not currently take a daily complete multi-vitamin. Les Don / Dalton Brewer has educated the patient that we recommend in order to decrease infection rates patient start a daily complete multi-vitamin from now until surgery if proceeding with surgery. Patient consumes the following beverage daily? Coke and Water    Patient states he / she has the following problems:  no - Eating  no - Chewing  yes - Swallowing - No Etiology  no - Nausea  no - Vomiting  yes - Diarrhea - Etiology - Metformin  yes - Constipation  yes - Hair Changes  yes - Missing Teeth  no - Wears Dentures  Food Allergies: no  -     Pt identifies his / her eating habits as the following:     Eats some fruits and or vegetables daily. Tries to eat healthy - whole wheat bread.  Moderate to high intake of empty-calorie foods (sweets, salty / fatty foods). Yes - Past medically assisted weight loss history reviewed. Yes - Provided education regarding the basic role of nutrition in junction with Bariatric Surgery. Yes - Provided overview of required dietary and behavioral changes pre and post operatively. Yes - Stated / reinforced that changes in dietary behaviors are critical to successful, long term weight Loss. Patient is aware that in order to proceed with bariatric surgery he / she will need to follow a low-fat diet prior to surgery. Patient is aware that bariatric surgery is a lifetime change that will require the patient to follow a low-fat, low-calorie, low-sugar, portion controlled diet with at least 30 minutes of physical activity daily. Patient is aware that certain foods may not be tolerated after bariatric surgery and certain foods will need avoided all together. Pt is aware supplementation of certain micro nutrients is lifelong along with the use of tracking both macro and micro nutrient intake daily after weight loss surgery. Pt is part of a comprehensive surgical weight loss program that is educating the patient on proper food choices, meal planning, portion control and label reading for use both before and after weight loss surgery. Pt is able to set measurable attainable nutrition goals that reinforce desired post-operative eating patterns when it comes to family, home based, or work settings. Mindful eating skills are being developed by the patient and how to identify the patients sense of hunger and fullness are being addressed. [address] / LD feels that this patient knowledge / readiness to make appropriate diet / lifestyle changes assessed to be? - Good    RD / LD feels this patients expected adherence for post surgical diet? - Good    Patient was instructed and signed consents on a low-fat diet and required supplementation at initial consult.     Comments: Patient is able to verbalize diet concepts for bariatric surgery. Patient is aware diet concepts will be reinforced at monthly pre-op weight loss counseling appointments. Pt will also attend a 3-hour nutrition education class once scheduled for surgery . RD / LD will monitor progress.

## 2022-10-25 NOTE — PATIENT INSTRUCTIONS
[facility]  Dietary Initial Appointment Patient Instructions    By: Stewart Snyder RD / ENEDINA  [phone]      At your initial dietary appointment you have received the following information packets:    Please be aware at each visit you have been instructed that in order for your insurance company to approve your surgery you must show a consistent weight loss of 2 lbs or greater at each visit. We can not guarantee an approval by your insurance company we can only provide the information given to us it is up to you the patient to show compliancy to your insurance company. If you do gain weight during your supervised weight loss counseling sessions insurance companies starting in 2018 are denying patients for not showing consistent weight loss results when part of a supervised weight loss counseling program. Pt was instructed on 10/25/22. Pt verbalized understanding. Low-Fat Diet  - Please be sure to begin your low-fat diet from today's date until your scheduled surgery date. If you are not at goal weight by your history and physical appointment with your surgeon your surgery will be cancelled. Goal Weight: 285 lbs //  BMI: 52.1  Low-Fat Diet Contract - Patient Acknowledge and Signed  Supplement List - Please be sure to be saving to purchase your 3 month supply of supplements. If you do not bring supplements to your history and physical appointment your surgery will be cancelled. Supplement Contract - Patient Acknowledge and Signed  Please be sure to take a daily Multi-vitamin from now until surgery to reduce your incidence of infection. If your insurance company requires additional dietary counseling you will be scheduled to see the dietitian the following month. If your psychological evaluation is completed and all your dietary requirements for your individual insurance company have been completed you will be submitted to insurance.  Please make sure to check with us to see if we have received your psychological evaluation. Please be aware that any co-pays or deductibles may be requested prior to testing and / or procedures. You will need to schedule a psychological evaluation for weight loss surgery. Patients will be required to complete all psychological testing as required by the psychologist. Patients must follow all of the psychologist's recommendations before weight loss surgery can be scheduled. The evaluation must be done a standard way for weight loss surgery. We strongly recommend that you contact one of our preferred providers listed below to arrange this:    Nandini Estrada, [facility]                                                              [address] [place] [address]                                                                                      [phone]     Carla Sears [address] [place] [address]                                                                                                [phone]        Dr. Margarette Del Valle, PhD                         Christina Neumann. [place], [place]                                                                                              [phone]     You will also need to plan on attending a 2 hour nutrition class at the [facility] prior to your surgery. We will schedule this for you when we schedule your surgery. Please remember to have your labs drawn prior to your scheduled appointment to review the results of your testing. Please remember, that while we will submit your case to insurance for surgery authorization, it is your responsibility to know if your plan covers weight loss surgery and keep up-to-date with changes to your insurance coverage.   We will do everything possible to help you get approved for weight loss surgery, but cannot guarantee an approval.      Please note that you will not be submitted to your insurance company until all   pre-operative testing requirements are met. Please remember you need to schedule to attend one Support Group meeting held at the [facility] before surgery. This one meeting is mandatory. You can attend as many support group meetings before and after surgery. Support group meetings are held at the [facility] from 6:00 - 8:00pm 1st, and 3rd Tuesday of every month. See dates listed below. Each individual person has his / her own medical requirements that need fulfilled before being able to schedule bariatric surgery . Please finalize these requirements by contacting our Bariatric Nurse at [phone].

## 2022-11-11 ENCOUNTER — OFFICE VISIT (OUTPATIENT)
Dept: BARIATRICS/WEIGHT MGMT | Age: 62
End: 2022-11-11
Payer: MEDICARE

## 2022-11-11 VITALS
BODY MASS INDEX: 53.92 KG/M2 | SYSTOLIC BLOOD PRESSURE: 172 MMHG | HEIGHT: 62 IN | HEART RATE: 81 BPM | WEIGHT: 293 LBS | RESPIRATION RATE: 20 BRPM | DIASTOLIC BLOOD PRESSURE: 96 MMHG | TEMPERATURE: 97.2 F

## 2022-11-11 DIAGNOSIS — K21.9 GASTROESOPHAGEAL REFLUX DISEASE WITHOUT ESOPHAGITIS: ICD-10-CM

## 2022-11-11 DIAGNOSIS — E66.01 MORBID OBESITY (HCC): Primary | ICD-10-CM

## 2022-11-11 DIAGNOSIS — Z71.3 NUTRITIONAL COUNSELING: ICD-10-CM

## 2022-11-11 PROCEDURE — 99213 OFFICE O/P EST LOW 20 MIN: CPT | Performed by: NURSE PRACTITIONER

## 2022-11-11 PROCEDURE — 3074F SYST BP LT 130 MM HG: CPT | Performed by: NURSE PRACTITIONER

## 2022-11-11 PROCEDURE — 99211 OFF/OP EST MAY X REQ PHY/QHP: CPT

## 2022-11-11 PROCEDURE — 3078F DIAST BP <80 MM HG: CPT | Performed by: NURSE PRACTITIONER

## 2022-11-11 NOTE — PATIENT INSTRUCTIONS
Goals:  Increase physical activity  Cut down on snacks  She has cut down to 1/2 can coke per day, will continue to try to do that  Keep a food diary and write down anything you put in your mouth to eat and drink    Patient was instructed on types of protein supplements and usage of protein supplements before and after surgery. The goal of protein intake after bariatric surgery is 60-80 grams daily. Patient was able to verbalize the instruction of how to take the supplements and the importance of their use before and after surgery. What is the next step to proceed with weight loss surgery? Please be aware that any co-pays or deductibles may be requested prior to testing and / or procedures. You will need to schedule a psychological evaluation for weight loss surgery. Patients will be required to complete all psychological testing as required by the mental health provider. Patients must also follow all of the provider's recommendations before weight loss surgery can be scheduled. The evaluation must be done a standard way for weight loss surgery. We strongly recommend that you contact one of our preferred providers listed below to arrange this:      Kirk Begum, [facility]  [address] [place], [place]   [phone]    [facility] and [address]   [phone]    Dr. Patrick Mathews, PhD    Elizabeth Tamayo. [place], [place]    [phone]      You will also need to plan on attending a 2 hour nutrition class at the [facility] prior to your surgery. We will schedule this for you when we schedule your surgery. Please remember to have your labs drawn 10 days prior to your first scheduled dietary appointment. Please remember, that while we will submit your case to insurance for surgery authorization, it is your responsibility to know if your plan covers weight loss surgery and keep up-to-date with changes to your insurance coverage.   We will do everything possible to help you get approved for weight loss surgery, but cannot guarantee an approval.     Please note that you will not be submitted to your insurance company until all pre-operative testing requirements are met.

## 2022-11-11 NOTE — PROGRESS NOTES
Trinidad Goins  11/11/2022  Bariatric Weight loss appointment for Obesity  Nutrition Education Session      Subjective:   Trinidad Goins is a 59 y.o. female here for pre-surgical dietary education today. Patient is accompanied by herself at today's visit. Patient reports that they are doing good following their previous dietary education. Questions today include none. Weight=(!) 306 lb (138.8 kg)  Today's weight represents a total weight loss to date of +6 pounds. Today we will discuss the topics of weight loss prior to surgery. Patients previous 24 hour dietary recall includes:  Breakfast: none  Snack: chocolate  Lunch: none  Snack: lays potato chips  Dinner: pork chop, green beans, baked potato with butter  Snack: hard boiled egg  Water intake: 48 ounces  Other beverages:1/2 can regular coke  Exercise: none    She does not exercise on a regular basis. She reports that she has picked up some of the protein powder. Has not tried the VLDL diet as of yet. Prior to Admission medications    Medication Sig Start Date End Date Taking? Authorizing Provider   lidocaine (LIDODERM) 5 % Place 1 patch onto the skin daily 12 hours on, 12 hours off. Yes Historical Provider, MD   pantoprazole (PROTONIX) 40 MG tablet Take 40 mg by mouth 2 times daily   Yes Historical Provider, MD   levothyroxine (SYNTHROID) 25 MCG tablet Take 50 mcg by mouth Daily   Yes Historical Provider, MD   fluticasone 200 MCG/ACT AEPB Inhale 1 puff into the lungs daily   Yes Historical Provider, MD   albuterol sulfate  (90 Base) MCG/ACT inhaler Inhale 2 puffs into the lungs as needed for Wheezing   Yes Historical Provider, MD   metFORMIN (GLUCOPHAGE) 500 MG tablet Take 500 mg by mouth 2 times daily (with meals)   Yes Historical Provider, MD   allopurinol (ZYLOPRIM) 100 MG tablet Take 200 mg by mouth daily   Yes Historical Provider, MD   hydrochlorothiazide (MICROZIDE) 12.5 MG capsule Take 12.5 mg by mouth daily.    Yes Historical Provider, MD   oxyCODONE-acetaminophen (PERCOCET) 5-325 MG per tablet Take 1 tablet by mouth every 6 hours as needed for Pain. Yes Historical Provider, MD   montelukast (SINGULAIR) 10 MG tablet Take 10 mg by mouth nightly. Yes Historical Provider, MD   metoprolol (LOPRESSOR) 25 MG tablet Take 25 mg by mouth 2 times daily. Yes Historical Provider, MD   cyclobenzaprine (FLEXERIL) 10 MG tablet Take 10 mg by mouth 3 times daily as needed for Muscle spasms   Yes Historical Provider, MD   diclofenac sodium (VOLTAREN) 1 % GEL Apply 2 g topically 2 times daily.    Yes Historical Provider, MD         Allergies   Allergen Reactions    Asa [Aspirin] Hives    Ibuprofen Other (See Comments)     Chest pain    Norco [Hydrocodone-Acetaminophen] Itching    Ultram [Tramadol] Itching     Past Medical History:   Diagnosis Date    Arthritis     Asthma     DM (diabetes mellitus) ([address]     HTN (hypertension)     Morbid obesity due to excess calories ([facility]     Obstructive sleep apnea syndrome     Pulmonary hypertension ([facility]     Retention of urine        Past Surgical History:   Procedure Laterality Date    APPENDECTOMY  1985    CATARACT REMOVAL Bilateral 2017    [address] (CERVIX STATUS UNKNOWN)  2017    JOINT REPLACEMENT Left 2009    knee    NERVE BLOCK N/A 12/03/2014    cervical #1    NERVE BLOCK N/A 12/10/2014    cervical epidural #2    NERVE BLOCK  12/17/2014    cerical epidural #3    UPPER GASTROINTESTINAL ENDOSCOPY N/A 10/17/2022    EGD BIOPSY performed by Dany Velez MD at [place] [address]       Current Outpatient Medications   Medication Sig Dispense Refill    lidocaine (LIDODERM) 5 % Place 1 patch onto the skin daily 12 hours on, 12 hours off.      pantoprazole (PROTONIX) 40 MG tablet Take 40 mg by mouth 2 times daily      levothyroxine (SYNTHROID) 25 MCG tablet Take 50 mcg by mouth Daily      fluticasone 200 MCG/ACT AEPB Inhale 1 puff into the lungs daily      albuterol sulfate  (90 Base) MCG/ACT inhaler Inhale 2 puffs into the lungs as needed for Wheezing      metFORMIN (GLUCOPHAGE) 500 MG tablet Take 500 mg by mouth 2 times daily (with meals)      allopurinol (ZYLOPRIM) 100 MG tablet Take 200 mg by mouth daily      hydrochlorothiazide (MICROZIDE) 12.5 MG capsule Take 12.5 mg by mouth daily. oxyCODONE-acetaminophen (PERCOCET) 5-325 MG per tablet Take 1 tablet by mouth every 6 hours as needed for Pain.       montelukast (SINGULAIR) 10 MG tablet Take 10 mg by mouth nightly. metoprolol (LOPRESSOR) 25 MG tablet Take 25 mg by mouth 2 times daily. cyclobenzaprine (FLEXERIL) 10 MG tablet Take 10 mg by mouth 3 times daily as needed for Muscle spasms      diclofenac sodium (VOLTAREN) 1 % GEL Apply 2 g topically 2 times daily. No current facility-administered medications for this visit.        Allergies   Allergen Reactions    Asa [Aspirin] Hives    Ibuprofen Other (See Comments)     Chest pain    Norco [Hydrocodone-Acetaminophen] Itching    Ultram [Tramadol] Itching       Family History   Problem Relation Age of Onset    Cancer Father     Diabetes Mother     Hypertension Mother        Social History     Socioeconomic History    Marital status: Single     Spouse name: Not on file    Number of children: Not on file    Years of education: Not on file    Highest education level: Not on file   Occupational History    Not on file   Tobacco Use    Smoking status: Never    Smokeless tobacco: Never   Vaping Use    Vaping Use: Unknown   Substance and Sexual Activity    Alcohol use: No    Drug use: No    Sexual activity: Not on file   Other Topics Concern    Not on file   Social History Narrative    Not on file     Social Determinants of Health     Financial Resource Strain: Not on file   Food Insecurity: Not on file   Transportation Needs: Not on file   Physical Activity: Not on file   Stress: Not on file   Social Connections: Not on file   Intimate Partner Violence: Not on file Housing Stability: Not on file           A complete 10 system review was performed and are otherwise negative unless mentioned in the above HPI. Specific negatives are listed below but may not include all those reviewed. General ROS: negative obtundation, AMS  ENT ROS: negative rhinorrhea, epistaxis  Allergy and Immunology ROS: negative itchy/watery eyes or nasal congestion  Hematological and Lymphatic ROS: negative spontaneous bleeding or bruising  Endocrine ROS: negative  lethargy, mood swings, palpitations or polydipsia/polyuria  Respiratory ROS: negative sputum changes, stridor, tachypnea or wheezing  Cardiovascular ROS: negative for - loss of consciousness, murmur or orthopnea  Gastrointestinal ROS: negative for - hematochezia or hematemesis  Genito-Urinary ROS: negative for -  genital discharge or hematuria  Musculoskeletal ROS: negative for - focal weakness, gangrene  Psych/Neuro ROS: negative for - visual or auditory hallucinations, suicidal ideation    Physical exam:   BP (!) 172/96 (Site: Left Lower Arm, Position: Sitting, Cuff Size: Medium Adult)   Pulse 81   Temp 97.2 °F (36.2 °C) (Temporal)   Resp 20   Ht 5' 2\" (1.575 m)   Wt (!) 306 lb (138.8 kg)   BMI 55.97 kg/m²   General appearance: AAO, NAD  Eyes: PERRL, EOMI, red conjunctiva  Lungs: Equal chest rise bilateral  Chest wall: atraumatic, no tenderness, no echymosis or abrasions  Heart: reg rate, no murmur  Abdomen: soft, nondistended, tender minimal, no hernias, no peritioneal signs  Extremities: full ROM all 4 ext, no gross motor or sensory deficits  Pulses: 2+ distal  Skin: warm and dry  Neurologic: spontanous eye opening, purposeful, follows complex commands  Psych: No tremor, no suicidal ideation, no hallucinations      Assessment:   1. Morbid obesity ([address]  See below    2. Nutritional counseling  Patient was instructed on types of protein supplements and usage of protein supplements before and after surgery.  The goal of protein intake after bariatric surgery is 60-80 grams daily. Patient was able to verbalize the instruction of how to take the supplements and the importance of their use before and after surgery. 3. Gastroesophageal reflux disease without esophagitis  See below    2nd session of dietary education pre weight loss surgery. Patient has 1 more sessions to attend. Goals: Increase physical activity  Cut down on snacks  She has cut down to 1/2 can coke per day, will continue to try to do that  Keep a food diary and write down anything you put in your mouth to eat and drink      Plan:   Continue to keep food diet, bring to next appointment with Nurse Practitioner or RD/LD    Continue to read food labels and make smart food choices    Increase physical activity at home    I have spent 20 minutes educating patient on nutrition. All questions were answered to patients and family's satisfaction. They verbalize the importance of pre surgical weight loss at this time.      Provider Signature: Electronically signed by ARMIN Phillips CNP

## 2022-12-29 ENCOUNTER — HOSPITAL ENCOUNTER (INPATIENT)
Age: 62
LOS: 9 days | Discharge: HOME | DRG: 190 | End: 2023-01-07
Attending: EMERGENCY MEDICINE | Admitting: INTERNAL MEDICINE
Payer: MEDICARE

## 2022-12-29 ENCOUNTER — APPOINTMENT (OUTPATIENT)
Dept: GENERAL RADIOLOGY | Age: 62
DRG: 190 | End: 2022-12-29
Payer: MEDICARE

## 2022-12-29 DIAGNOSIS — J44.1 COPD EXACERBATION (HCC): Primary | ICD-10-CM

## 2022-12-29 LAB
ANION GAP SERPL CALCULATED.3IONS-SCNC: 9 MMOL/L (ref 7–16)
BUN BLDV-MCNC: 14 MG/DL (ref 6–23)
CALCIUM SERPL-MCNC: 9.4 MG/DL (ref 8.6–10.2)
CHLORIDE BLD-SCNC: 102 MMOL/L (ref 98–107)
CO2: 32 MMOL/L (ref 22–29)
CREAT SERPL-MCNC: 0.8 MG/DL (ref 0.5–1)
D DIMER: 630 NG/ML DDU
GFR SERPL CREATININE-BSD FRML MDRD: >60 ML/MIN/1.73
GLUCOSE BLD-MCNC: 133 MG/DL (ref 74–99)
HCT VFR BLD CALC: 33.7 % (ref 34–48)
HEMOGLOBIN: 9.7 G/DL (ref 11.5–15.5)
INFLUENZA A BY PCR: NOT DETECTED
INFLUENZA B BY PCR: NOT DETECTED
MAGNESIUM: 2 MG/DL (ref 1.6–2.6)
MCH RBC QN AUTO: 23.5 PG (ref 26–35)
MCHC RBC AUTO-ENTMCNC: 28.8 % (ref 32–34.5)
MCV RBC AUTO: 81.8 FL (ref 80–99.9)
PDW BLD-RTO: 15.4 FL (ref 11.5–15)
PLATELET # BLD: 169 E9/L (ref 130–450)
PMV BLD AUTO: ABNORMAL FL (ref 7–12)
POTASSIUM SERPL-SCNC: 4.1 MMOL/L (ref 3.5–5)
PRO-BNP: 74 PG/ML (ref 0–125)
RBC # BLD: 4.12 E12/L (ref 3.5–5.5)
REASON FOR REJECTION: NORMAL
REJECTED TEST: NORMAL
SARS-COV-2, NAAT: NOT DETECTED
SODIUM BLD-SCNC: 143 MMOL/L (ref 132–146)
TROPONIN, HIGH SENSITIVITY: 14 NG/L (ref 0–9)
WBC # BLD: 6.3 E9/L (ref 4.5–11.5)

## 2022-12-29 PROCEDURE — 80048 BASIC METABOLIC PNL TOTAL CA: CPT

## 2022-12-29 PROCEDURE — 94644 CONT INHLJ TX 1ST HOUR: CPT

## 2022-12-29 PROCEDURE — 83880 ASSAY OF NATRIURETIC PEPTIDE: CPT

## 2022-12-29 PROCEDURE — 96375 TX/PRO/DX INJ NEW DRUG ADDON: CPT

## 2022-12-29 PROCEDURE — 6360000002 HC RX W HCPCS: Performed by: EMERGENCY MEDICINE

## 2022-12-29 PROCEDURE — 6370000000 HC RX 637 (ALT 250 FOR IP): Performed by: INTERNAL MEDICINE

## 2022-12-29 PROCEDURE — 85378 FIBRIN DEGRADE SEMIQUANT: CPT

## 2022-12-29 PROCEDURE — 84484 ASSAY OF TROPONIN QUANT: CPT

## 2022-12-29 PROCEDURE — 6360000002 HC RX W HCPCS: Performed by: INTERNAL MEDICINE

## 2022-12-29 PROCEDURE — 93005 ELECTROCARDIOGRAM TRACING: CPT | Performed by: EMERGENCY MEDICINE

## 2022-12-29 PROCEDURE — 83735 ASSAY OF MAGNESIUM: CPT

## 2022-12-29 PROCEDURE — 87502 INFLUENZA DNA AMP PROBE: CPT

## 2022-12-29 PROCEDURE — 87635 SARS-COV-2 COVID-19 AMP PRB: CPT

## 2022-12-29 PROCEDURE — 2580000003 HC RX 258: Performed by: INTERNAL MEDICINE

## 2022-12-29 PROCEDURE — 0202U NFCT DS 22 TRGT SARS-COV-2: CPT

## 2022-12-29 PROCEDURE — 71046 X-RAY EXAM CHEST 2 VIEWS: CPT

## 2022-12-29 PROCEDURE — 85027 COMPLETE CBC AUTOMATED: CPT

## 2022-12-29 PROCEDURE — 6370000000 HC RX 637 (ALT 250 FOR IP): Performed by: EMERGENCY MEDICINE

## 2022-12-29 PROCEDURE — 94640 AIRWAY INHALATION TREATMENT: CPT

## 2022-12-29 PROCEDURE — 99285 EMERGENCY DEPT VISIT HI MDM: CPT

## 2022-12-29 PROCEDURE — 1200000000 HC SEMI PRIVATE

## 2022-12-29 PROCEDURE — 96365 THER/PROPH/DIAG IV INF INIT: CPT

## 2022-12-29 RX ORDER — MAGNESIUM SULFATE IN WATER 40 MG/ML
2000 INJECTION, SOLUTION INTRAVENOUS ONCE
Status: COMPLETED | OUTPATIENT
Start: 2022-12-29 | End: 2022-12-29

## 2022-12-29 RX ORDER — IPRATROPIUM BROMIDE AND ALBUTEROL SULFATE 2.5; .5 MG/3ML; MG/3ML
1 SOLUTION RESPIRATORY (INHALATION) EVERY 4 HOURS
Status: DISCONTINUED | OUTPATIENT
Start: 2022-12-29 | End: 2023-01-01

## 2022-12-29 RX ORDER — IPRATROPIUM BROMIDE AND ALBUTEROL SULFATE 2.5; .5 MG/3ML; MG/3ML
1 SOLUTION RESPIRATORY (INHALATION) EVERY 4 HOURS
Status: DISCONTINUED | OUTPATIENT
Start: 2022-12-29 | End: 2022-12-29 | Stop reason: SDUPTHER

## 2022-12-29 RX ORDER — METHYLPREDNISOLONE SODIUM SUCCINATE 40 MG/ML
40 INJECTION, POWDER, LYOPHILIZED, FOR SOLUTION INTRAMUSCULAR; INTRAVENOUS EVERY 8 HOURS
Status: DISCONTINUED | OUTPATIENT
Start: 2022-12-29 | End: 2023-01-06

## 2022-12-29 RX ORDER — LEVOTHYROXINE SODIUM 0.05 MG/1
50 TABLET ORAL DAILY
Status: DISCONTINUED | OUTPATIENT
Start: 2022-12-30 | End: 2023-01-07 | Stop reason: HOSPADM

## 2022-12-29 RX ORDER — DEXTROSE MONOHYDRATE 100 MG/ML
INJECTION, SOLUTION INTRAVENOUS CONTINUOUS PRN
Status: DISCONTINUED | OUTPATIENT
Start: 2022-12-29 | End: 2023-01-07 | Stop reason: HOSPADM

## 2022-12-29 RX ORDER — IPRATROPIUM BROMIDE AND ALBUTEROL SULFATE 2.5; .5 MG/3ML; MG/3ML
3 SOLUTION RESPIRATORY (INHALATION) ONCE
Status: COMPLETED | OUTPATIENT
Start: 2022-12-29 | End: 2022-12-29

## 2022-12-29 RX ORDER — PANTOPRAZOLE SODIUM 40 MG/1
40 TABLET, DELAYED RELEASE ORAL 2 TIMES DAILY
Status: DISCONTINUED | OUTPATIENT
Start: 2022-12-29 | End: 2023-01-07 | Stop reason: HOSPADM

## 2022-12-29 RX ORDER — SODIUM CHLORIDE 0.9 % (FLUSH) 0.9 %
5-40 SYRINGE (ML) INJECTION PRN
Status: DISCONTINUED | OUTPATIENT
Start: 2022-12-29 | End: 2023-01-07 | Stop reason: HOSPADM

## 2022-12-29 RX ORDER — ENOXAPARIN SODIUM 100 MG/ML
30 INJECTION SUBCUTANEOUS 2 TIMES DAILY
Status: DISCONTINUED | OUTPATIENT
Start: 2022-12-29 | End: 2023-01-07 | Stop reason: HOSPADM

## 2022-12-29 RX ORDER — ALLOPURINOL 100 MG/1
200 TABLET ORAL DAILY
Status: DISCONTINUED | OUTPATIENT
Start: 2022-12-30 | End: 2023-01-07 | Stop reason: HOSPADM

## 2022-12-29 RX ORDER — SODIUM CHLORIDE 0.9 % (FLUSH) 0.9 %
5-40 SYRINGE (ML) INJECTION EVERY 12 HOURS SCHEDULED
Status: DISCONTINUED | OUTPATIENT
Start: 2022-12-29 | End: 2023-01-07 | Stop reason: HOSPADM

## 2022-12-29 RX ORDER — MONTELUKAST SODIUM 10 MG/1
10 TABLET ORAL NIGHTLY
Status: DISCONTINUED | OUTPATIENT
Start: 2022-12-29 | End: 2023-01-07 | Stop reason: HOSPADM

## 2022-12-29 RX ORDER — ACETAMINOPHEN 650 MG/1
650 SUPPOSITORY RECTAL EVERY 6 HOURS PRN
Status: DISCONTINUED | OUTPATIENT
Start: 2022-12-29 | End: 2023-01-07 | Stop reason: HOSPADM

## 2022-12-29 RX ORDER — SODIUM CHLORIDE 9 MG/ML
INJECTION, SOLUTION INTRAVENOUS PRN
Status: DISCONTINUED | OUTPATIENT
Start: 2022-12-29 | End: 2023-01-07 | Stop reason: HOSPADM

## 2022-12-29 RX ORDER — ACETAMINOPHEN 325 MG/1
650 TABLET ORAL EVERY 6 HOURS PRN
Status: DISCONTINUED | OUTPATIENT
Start: 2022-12-29 | End: 2023-01-07 | Stop reason: HOSPADM

## 2022-12-29 RX ORDER — METHYLPREDNISOLONE SODIUM SUCCINATE 125 MG/2ML
125 INJECTION, POWDER, LYOPHILIZED, FOR SOLUTION INTRAMUSCULAR; INTRAVENOUS ONCE
Status: COMPLETED | OUTPATIENT
Start: 2022-12-29 | End: 2022-12-29

## 2022-12-29 RX ORDER — POLYETHYLENE GLYCOL 3350 17 G/17G
17 POWDER, FOR SOLUTION ORAL DAILY PRN
Status: DISCONTINUED | OUTPATIENT
Start: 2022-12-29 | End: 2023-01-07 | Stop reason: HOSPADM

## 2022-12-29 RX ORDER — FENTANYL CITRATE 0.05 MG/ML
50 INJECTION, SOLUTION INTRAMUSCULAR; INTRAVENOUS ONCE
Status: COMPLETED | OUTPATIENT
Start: 2022-12-29 | End: 2022-12-29

## 2022-12-29 RX ORDER — BUDESONIDE 0.5 MG/2ML
0.5 INHALANT ORAL 2 TIMES DAILY
Status: DISCONTINUED | OUTPATIENT
Start: 2022-12-29 | End: 2023-01-06

## 2022-12-29 RX ADMIN — ENOXAPARIN SODIUM 30 MG: 100 INJECTION SUBCUTANEOUS at 21:29

## 2022-12-29 RX ADMIN — MONTELUKAST 10 MG: 10 TABLET, FILM COATED ORAL at 21:30

## 2022-12-29 RX ADMIN — PANTOPRAZOLE SODIUM 40 MG: 40 TABLET, DELAYED RELEASE ORAL at 21:30

## 2022-12-29 RX ADMIN — Medication 10 ML: at 21:28

## 2022-12-29 RX ADMIN — METOPROLOL TARTRATE 25 MG: 25 TABLET, FILM COATED ORAL at 21:30

## 2022-12-29 RX ADMIN — FENTANYL CITRATE 50 MCG: 50 INJECTION INTRAMUSCULAR; INTRAVENOUS at 17:44

## 2022-12-29 RX ADMIN — IPRATROPIUM BROMIDE AND ALBUTEROL SULFATE 3 AMPULE: .5; 2.5 SOLUTION RESPIRATORY (INHALATION) at 15:26

## 2022-12-29 RX ADMIN — MAGNESIUM SULFATE HEPTAHYDRATE 2000 MG: 40 INJECTION, SOLUTION INTRAVENOUS at 15:18

## 2022-12-29 RX ADMIN — METHYLPREDNISOLONE SODIUM SUCCINATE 125 MG: 125 INJECTION, POWDER, FOR SOLUTION INTRAMUSCULAR; INTRAVENOUS at 15:13

## 2022-12-29 RX ADMIN — BUDESONIDE 500 MCG: 0.5 SUSPENSION RESPIRATORY (INHALATION) at 21:15

## 2022-12-29 RX ADMIN — IPRATROPIUM BROMIDE AND ALBUTEROL SULFATE 1 AMPULE: .5; 2.5 SOLUTION RESPIRATORY (INHALATION) at 21:15

## 2022-12-29 ASSESSMENT — ENCOUNTER SYMPTOMS
SINUS PRESSURE: 0
DIARRHEA: 0
ABDOMINAL PAIN: 0
SHORTNESS OF BREATH: 1
COUGH: 1
SPUTUM PRODUCTION: 1
EYE REDNESS: 0
ABDOMINAL DISTENTION: 0
EYE PAIN: 0
VOMITING: 0
SORE THROAT: 0
NAUSEA: 0
EYE DISCHARGE: 0
BACK PAIN: 0
WHEEZING: 1

## 2022-12-29 ASSESSMENT — PAIN DESCRIPTION - ORIENTATION: ORIENTATION: LOWER

## 2022-12-29 ASSESSMENT — PAIN SCALES - GENERAL
PAINLEVEL_OUTOF10: 8

## 2022-12-29 ASSESSMENT — PAIN DESCRIPTION - LOCATION
LOCATION: BACK
LOCATION: BACK

## 2022-12-29 ASSESSMENT — PAIN DESCRIPTION - PAIN TYPE: TYPE: CHRONIC PAIN

## 2022-12-29 ASSESSMENT — PULMONARY FUNCTION TESTS: PEFR_L/MIN: 150

## 2022-12-29 NOTE — H&P
Department of Internal Medicine  History and Physical    PCP: Samantha Tuttle DO  Admitting Physician: Dr. Hanley Scheuermann  Consultants:   Date of Service: 12/29/2022    CHIEF COMPLAINT:  sob    HISTORY OF PRESENT ILLNESS:    Patient is 49-year-old female who presented to the ED due to shortness of breath. patient admits to associated cough but denies any sputum production. She does admit to subjective fever and chills. She does have history of sleep apnea but does not use CPAP. PAST MEDICAL Hx:  Past Medical History:   Diagnosis Date    Arthritis     Asthma     DM (diabetes mellitus) ([address]     HTN (hypertension)     Morbid obesity due to excess calories ([address]     Obstructive sleep apnea syndrome     Pulmonary hypertension ([facility]     Retention of urine        PAST SURGICAL Hx:   Past Surgical History:   Procedure Laterality Date    APPENDECTOMY  1985    CATARACT REMOVAL Bilateral 2017    [address] (CERVIX STATUS UNKNOWN)  2017    JOINT REPLACEMENT Left 2009    knee    NERVE BLOCK N/A 12/03/2014    cervical #1    NERVE BLOCK N/A 12/10/2014    cervical epidural #2    NERVE BLOCK  12/17/2014    cerical epidural #3    UPPER GASTROINTESTINAL ENDOSCOPY N/A 10/17/2022    EGD BIOPSY performed by Fam Philip MD at [address] Hx:  Family History   Problem Relation Age of Onset    Cancer Father     Diabetes Mother     Hypertension Mother        HOME MEDICATIONS:  Prior to Admission medications    Medication Sig Start Date End Date Taking? Authorizing Provider   lidocaine (LIDODERM) 5 % Place 1 patch onto the skin daily 12 hours on, 12 hours off.     Historical Provider, MD   pantoprazole (PROTONIX) 40 MG tablet Take 40 mg by mouth 2 times daily    Historical Provider, MD   levothyroxine (SYNTHROID) 25 MCG tablet Take 50 mcg by mouth Daily    Historical Provider, MD   fluticasone 200 MCG/ACT AEPB Inhale 1 puff into the lungs daily    Historical Provider, MD albuterol sulfate  (90 Base) MCG/ACT inhaler Inhale 2 puffs into the lungs as needed for Wheezing    Historical Provider, MD   metFORMIN (GLUCOPHAGE) 500 MG tablet Take 500 mg by mouth 2 times daily (with meals)    Historical Provider, MD   allopurinol (ZYLOPRIM) 100 MG tablet Take 200 mg by mouth daily    Historical Provider, MD   hydrochlorothiazide (MICROZIDE) 12.5 MG capsule Take 12.5 mg by mouth daily. Historical Provider, MD   oxyCODONE-acetaminophen (PERCOCET) 5-325 MG per tablet Take 1 tablet by mouth every 6 hours as needed for Pain. Historical Provider, MD   montelukast (SINGULAIR) 10 MG tablet Take 10 mg by mouth nightly. Historical Provider, MD   metoprolol (LOPRESSOR) 25 MG tablet Take 25 mg by mouth 2 times daily. Historical Provider, MD   cyclobenzaprine (FLEXERIL) 10 MG tablet Take 10 mg by mouth 3 times daily as needed for Muscle spasms    Historical Provider, MD   diclofenac sodium (VOLTAREN) 1 % GEL Apply 2 g topically 2 times daily.     Historical Provider, MD       ALLERGIES:  Merrick Riggs [aspirin], Ibuprofen, Norco [hydrocodone-acetaminophen], and Ultram [tramadol]    SOCIAL Hx:  Social History     Socioeconomic History    Marital status: Single     Spouse name: Not on file    Number of children: Not on file    Years of education: Not on file    Highest education level: Not on file   Occupational History    Not on file   Tobacco Use    Smoking status: Never    Smokeless tobacco: Never   Vaping Use    Vaping Use: Unknown   Substance and Sexual Activity    Alcohol use: No    Drug use: No    Sexual activity: Not on file   Other Topics Concern    Not on file   Social History Narrative    Not on file     Social Determinants of Health     Financial Resource Strain: Not on file   Food Insecurity: Not on file   Transportation Needs: Not on file   Physical Activity: Not on file   Stress: Not on file   Social Connections: Not on file   Intimate Partner Violence: Not on file   Housing Stability: Not on file       ROS: Positive in bold  General:   Denies chills, fatigue, fever, malaise, night sweats or weight loss    Psychological:   Denies anxiety, disorientation or hallucinations    ENT:    Denies epistaxis, headaches, vertigo or visual changes    Cardiovascular:   Denies any chest pain, irregular heartbeats, or palpitations. No paroxysmal nocturnal dyspnea. Respiratory:   Denies shortness of breath, coughing, sputum production, hemoptysis, or wheezing. No orthopnea. Gastrointestinal:   Denies nausea, vomiting, diarrhea, or constipation. Denies any abdominal pain. Denies change in bowel habits or stools. Genito-Urinary:    Denies any urgency, frequency, hematuria. Voiding without difficulty. Musculoskeletal:   Denies joint pain, joint stiffness, joint swelling or muscle pain    Neurology:    Denies any headache or focal neurological deficits. No weakness or paresthesia. Derm:    Denies any rashes, ulcers, or excoriations. Denies bruising. Extremities:   Denies any lower extremity swelling or edema. PHYSICAL EXAM: Abnormal findings noted  VITALS:  Vitals:    12/29/22 1728   BP: (!) 143/67   Pulse: 83   Resp: 18   Temp: 98.2 °F (36.8 °C)   SpO2: 94%         CONSTITUTIONAL:    Awake, alert, cooperative, no apparent distress, and appears stated age    EYES:     EOMI, sclera clear, conjunctiva normal    ENT:    Normocephalic, atraumatic,  External ears without lesions. NECK:    Supple, symmetrical, trachea midline, no JVD    HEMATOLOGIC/LYMPHATICS:    No cervical lymphadenopathy and no supraclavicular lymphadenopathy    LUNGS:    Symmetric.  No increased work of breathing, good air exchange, clear to auscultation bilaterally, no wheezes, rhonchi, or rales,   Patient has diminished breath sounds bilaterally    CARDIOVASCULAR:    Normal apical impulse, regular rate and rhythm, normal S1 and S2, no S3 or S4, and no murmur noted    ABDOMEN:    soft, non-distended, non-tender    MUSCULOSKELETAL:    There is no redness, warmth, or swelling of the joints. NEUROLOGIC:    Awake, alert, oriented to name, place and time. SKIN:    No bruising or bleeding. No redness, warmth, or swelling    EXTREMITIES:    Peripheral pulses present. No edema, cyanosis, or swelling. LINES/CATHETERS     LABORATORY DATA:  CBC with Differential:    Lab Results   Component Value Date/Time    WBC 6.3 12/29/2022 02:09 PM    RBC 4.12 12/29/2022 02:09 PM    HGB 9.7 12/29/2022 02:09 PM    HCT 33.7 12/29/2022 02:09 PM     12/29/2022 02:09 PM    MCV 81.8 12/29/2022 02:09 PM    MCH 23.5 12/29/2022 02:09 PM    MCHC 28.8 12/29/2022 02:09 PM    RDW 15.4 12/29/2022 02:09 PM     CMP:    Lab Results   Component Value Date/Time     12/29/2022 05:19 PM    K 4.1 12/29/2022 05:19 PM     12/29/2022 05:19 PM    CO2 32 12/29/2022 05:19 PM    BUN 14 12/29/2022 05:19 PM    CREATININE 0.8 12/29/2022 05:19 PM    GFRAA >60 10/17/2022 11:38 AM    LABGLOM >60 12/29/2022 05:19 PM    GLUCOSE 133 12/29/2022 05:19 PM    PROT 7.4 10/17/2022 11:38 AM    LABALBU 4.2 10/17/2022 11:38 AM    CALCIUM 9.4 12/29/2022 05:19 PM    BILITOT 0.4 10/17/2022 11:38 AM    ALKPHOS 101 10/17/2022 11:38 AM    AST 15 10/17/2022 11:38 AM    ALT 14 10/17/2022 11:38 AM       ASSESSMENT/PLAN:  Acute COPD exacerbation   Acute on chronic chronic normocytic anemia  Asthma  sleep apnea without use of CPAP  Mild pulmonary hypertension  Mild tricuspid regurgitation  Non-insulin-dependent diabetes mellitus  Hypertension    Patient presented with shortness of breath. Patient found to be in acute COPD exacerbation. Patient placed on nebulized breathing treatments and IV steroids. Viral respiratory panel ordered. Continue to monitor pulse ox.     Velna Peabody, [place]  6:15 PM  12/29/2022    Electronically signed by Velna Peabody, DO on 12/29/22 at 6:15 PM EST

## 2022-12-29 NOTE — ED PROVIDER NOTES
This patient presents with complaint of shortness of breath. She reports long history of asthma. She reports having followed with Dr. Arpit Cabral remotely. She saw her primary care physician today, Dr. Daren Carroll. after several weeks of increasing shortness of breath, cough and sputum production. She denies fever or chills. She describes that she is unable to go about her activities of daily living secondary to the fairly significant shortness of breath with the littlest of exertion. The history is provided by the patient. Shortness of Breath  Severity:  Moderate  Onset quality:  Gradual  Duration:  3 weeks  Timing:  Constant  Progression:  Worsening  Chronicity:  New  Relieved by:  None tried  Worsened by: Activity  Ineffective treatments:  Rest  Associated symptoms: cough, sputum production and wheezing    Associated symptoms: no abdominal pain, no chest pain, no claudication, no diaphoresis, no fever, no headaches, no rash, no sore throat and no vomiting    Risk factors: obesity       Review of Systems   Constitutional:  Negative for chills, diaphoresis and fever. HENT:  Negative for sinus pressure and sore throat. Eyes:  Negative for pain, discharge and redness. Respiratory:  Positive for cough, sputum production, shortness of breath and wheezing. Cardiovascular:  Negative for chest pain and claudication. Gastrointestinal:  Negative for abdominal distention, abdominal pain, diarrhea, nausea and vomiting. Genitourinary:  Negative for dysuria and frequency. Musculoskeletal:  Negative for arthralgias and back pain. Skin:  Negative for rash and wound. Neurological:  Negative for weakness and headaches. Hematological:  Negative for adenopathy. All other systems reviewed and are negative. Physical Exam  Vitals and nursing note reviewed. Constitutional:       Appearance: She is well-developed. HENT:      Head: Normocephalic and atraumatic.    Eyes:      Pupils: Pupils are equal, round, and reactive to light. Cardiovascular:      Rate and Rhythm: Normal rate and regular rhythm. Heart sounds: Normal heart sounds. No murmur heard. Pulmonary:      Effort: Pulmonary effort is normal. No respiratory distress. Breath sounds: Decreased breath sounds and wheezing present. No rhonchi or rales. Comments: Patient with notable conversational dyspnea. Diffuse expiratory wheezing and very tight lung sounds are noted. Chest:      Chest wall: No tenderness. Abdominal:      General: Bowel sounds are normal.      Palpations: Abdomen is soft. Tenderness: There is no abdominal tenderness. There is no guarding or rebound. Musculoskeletal:      Cervical back: Normal range of motion and neck supple. Right lower leg: No tenderness. Left lower leg: No tenderness. Skin:     General: Skin is warm and dry. Neurological:      Mental Status: She is alert and oriented to person, place, and time. Cranial Nerves: No cranial nerve deficit. Coordination: Coordination normal.        Procedures     MDM       EKG: This EKG is signed and interpreted by me. Rate: 74  Rhythm: Sinus  Interpretation: no acute changes and non-specific EKG  Comparison: stable as compared to patient's most recent EKG         --------------------------------------------- PAST HISTORY ---------------------------------------------  Past Medical History:  has a past medical history of Arthritis, Asthma, DM (diabetes mellitus) ([address] HTN (hypertension), Morbid obesity due to excess calories ([address] Obstructive sleep apnea syndrome, Pulmonary hypertension ([address] and Retention of urine. Past Surgical History:  has a past surgical history that includes Cholecystectomy (1981); Appendectomy (1985); Hysterectomy (2017); joint replacement (Left, 2009); Nerve Block (N/A, 12/03/2014); Nerve Block (N/A, 12/10/2014); Nerve Block (12/17/2014); hernia repair (1998);  Cataract removal (Bilateral, 2017); and Upper gastrointestinal endoscopy (N/A, 10/17/2022). Social History:  reports that she has never smoked. She has never used smokeless tobacco. She reports that she does not drink alcohol and does not use drugs. Family History: family history includes Cancer in her father; Diabetes in her mother; Hypertension in her mother. The patients home medications have been reviewed.     Allergies: Asa [aspirin], Ibuprofen, Norco [hydrocodone-acetaminophen], and Ultram [tramadol]    -------------------------------------------------- RESULTS -------------------------------------------------    LABS:  Results for orders placed or performed during the hospital encounter of 12/29/22   COVID-19, Rapid    Specimen: Nasopharyngeal Swab   Result Value Ref Range    SARS-CoV-2, NAAT Not Detected Not Detected   Rapid influenza A/B antigens    Specimen: Nasopharyngeal   Result Value Ref Range    Influenza A by PCR Not Detected Not Detected    Influenza B by PCR Not Detected Not Detected   Respiratory Panel, Molecular, with COVID-19 (Restricted: peds pts or suitable admitted adults)    Specimen: Nasopharyngeal   Result Value Ref Range    Adenovirus by PCR Not Detected Not Detected    Bordetella parapertussis by PCR Not Detected Not Detected    Bordetella pertussis by PCR Not Detected Not Detected    Chlamydophilia pneumoniae by PCR Not Detected Not Detected    Coronavirus 229E by PCR Not Detected Not Detected    Coronavirus HKU1 by PCR Not Detected Not Detected    Coronavirus NL63 by PCR Not Detected Not Detected    Coronavirus OC43 by PCR Not Detected Not Detected    SARS-CoV-2, PCR Not Detected Not Detected    Human Metapneumovirus by PCR Not Detected Not Detected    Human Rhinovirus/Enterovirus by PCR Not Detected Not Detected    Influenza A by PCR Not Detected Not Detected    Influenza B by PCR Not Detected Not Detected    Mycoplasma pneumoniae by PCR Not Detected Not Detected    Parainfluenza Virus 1 by PCR Not Detected Not Detected Parainfluenza Virus 2 by PCR Not Detected Not Detected    Parainfluenza Virus 3 by PCR Not Detected Not Detected    Parainfluenza Virus 4 by PCR Not Detected Not Detected    Respiratory Syncytial Virus by PCR Not Detected Not Detected   CBC   Result Value Ref Range    WBC 6.3 4.5 - 11.5 E9/L    RBC 4.12 3.50 - 5.50 E12/L    Hemoglobin 9.7 (L) 11.5 - 15.5 g/dL    Hematocrit 33.7 (L) 34.0 - 48.0 %    MCV 81.8 80.0 - 99.9 fL    MCH 23.5 (L) 26.0 - 35.0 pg    MCHC 28.8 (L) 32.0 - 34.5 %    RDW 15.4 (H) 11.5 - 15.0 fL    Platelets 431 732 - 141 E9/L    MPV NOT CALC 7.0 - 12.0 fL   Brain Natriuretic Peptide   Result Value Ref Range    Pro-BNP 74 0 - 125 pg/mL   Magnesium   Result Value Ref Range    Magnesium 2.0 1.6 - 2.6 mg/dL   SPECIMEN REJECTION   Result Value Ref Range    Rejected Test bmp trp     Reason for Rejection see below    Basic Metabolic Panel   Result Value Ref Range    Sodium 143 132 - 146 mmol/L    Potassium 4.1 3.5 - 5.0 mmol/L    Chloride 102 98 - 107 mmol/L    CO2 32 (H) 22 - 29 mmol/L    Anion Gap 9 7 - 16 mmol/L    Glucose 133 (H) 74 - 99 mg/dL    BUN 14 6 - 23 mg/dL    Creatinine 0.8 0.5 - 1.0 mg/dL    Est, Glom Filt Rate >60 >=60 mL/min/1.73    Calcium 9.4 8.6 - 10.2 mg/dL   Troponin   Result Value Ref Range    Troponin, High Sensitivity 14 (H) 0 - 9 ng/L   D-Dimer, Quantitative   Result Value Ref Range    D-Dimer, Quant 630 ng/mL DDU   Comprehensive Metabolic Panel   Result Value Ref Range    Sodium 142 132 - 146 mmol/L    Potassium 4.2 3.5 - 5.0 mmol/L    Chloride 104 98 - 107 mmol/L    CO2 28 22 - 29 mmol/L    Anion Gap 10 7 - 16 mmol/L    Glucose 139 (H) 74 - 99 mg/dL    BUN 12 6 - 23 mg/dL    Creatinine 0.7 0.5 - 1.0 mg/dL    Est, Glom Filt Rate >60 >=60 mL/min/1.73    Calcium 9.2 8.6 - 10.2 mg/dL    Total Protein 6.9 6.4 - 8.3 g/dL    Albumin 4.0 3.5 - 5.2 g/dL    Total Bilirubin 0.3 0.0 - 1.2 mg/dL    Alkaline Phosphatase 97 35 - 104 U/L    ALT 11 0 - 32 U/L    AST 14 0 - 31 U/L   CBC with Auto Differential   Result Value Ref Range    WBC 5.9 4.5 - 11.5 E9/L    RBC 4.00 3.50 - 5.50 E12/L    Hemoglobin 9.9 (L) 11.5 - 15.5 g/dL    Hematocrit 32.0 (L) 34.0 - 48.0 %    MCV 80.0 80.0 - 99.9 fL    MCH 24.8 (L) 26.0 - 35.0 pg    MCHC 30.9 (L) 32.0 - 34.5 %    RDW 15.4 (H) 11.5 - 15.0 fL    Platelets 272 684 - 639 E9/L    MPV NOT CALC 7.0 - 12.0 fL    Neutrophils % 89.5 (H) 43.0 - 80.0 %    Lymphocytes % 8.8 (L) 20.0 - 42.0 %    Monocytes % 1.8 (L) 2.0 - 12.0 %    Eosinophils % 0.0 0.0 - 6.0 %    Basophils % 0.0 0.0 - 2.0 %    Neutrophils Absolute 5.31 1.80 - 7.30 E9/L    Lymphocytes Absolute 0.53 (L) 1.50 - 4.00 E9/L    Monocytes Absolute 0.12 0.10 - 0.95 E9/L    Eosinophils Absolute 0.00 (L) 0.05 - 0.50 E9/L    Basophils Absolute 0.00 0.00 - 0.20 E9/L    Anisocytosis 1+     Polychromasia 1+     Hypochromia 1+     Poikilocytes 1+     Ovalocytes 1+     Target Cells 1+    Magnesium   Result Value Ref Range    Magnesium 2.3 1.6 - 2.6 mg/dL   Phosphorus   Result Value Ref Range    Phosphorus 2.3 (L) 2.5 - 4.5 mg/dL   Procalcitonin   Result Value Ref Range    Procalcitonin 0.07 0.00 - 0.08 ng/mL   TSH   Result Value Ref Range    TSH 0.619 0.270 - 4.200 uIU/mL   T4, Free   Result Value Ref Range    T4 Free 1.53 0.93 - 1.70 ng/dL   Comprehensive Metabolic Panel   Result Value Ref Range    Sodium 141 132 - 146 mmol/L    Potassium 4.1 3.5 - 5.0 mmol/L    Chloride 102 98 - 107 mmol/L    CO2 29 22 - 29 mmol/L    Anion Gap 10 7 - 16 mmol/L    Glucose 151 (H) 74 - 99 mg/dL    BUN 18 6 - 23 mg/dL    Creatinine 0.8 0.5 - 1.0 mg/dL    Est, Glom Filt Rate >60 >=60 mL/min/1.73    Calcium 8.7 8.6 - 10.2 mg/dL    Total Protein 6.8 6.4 - 8.3 g/dL    Albumin 3.9 3.5 - 5.2 g/dL    Total Bilirubin 0.3 0.0 - 1.2 mg/dL    Alkaline Phosphatase 90 35 - 104 U/L    ALT 10 0 - 32 U/L    AST 13 0 - 31 U/L   CBC with Auto Differential   Result Value Ref Range    WBC 8.1 4.5 - 11.5 E9/L    RBC 4.00 3.50 - 5.50 E12/L    Hemoglobin 9.7 (L) 11.5 - 15.5 g/dL    Hematocrit 32.3 (L) 34.0 - 48.0 %    MCV 80.8 80.0 - 99.9 fL    MCH 24.3 (L) 26.0 - 35.0 pg    MCHC 30.0 (L) 32.0 - 34.5 %    RDW 15.7 (H) 11.5 - 15.0 fL    Platelets 228 204 - 126 E9/L    MPV 13.7 (H) 7.0 - 12.0 fL    Neutrophils % 89.6 (H) 43.0 - 80.0 %    Lymphocytes % 7.0 (L) 20.0 - 42.0 %    Monocytes % 3.5 2.0 - 12.0 %    Eosinophils % 0.0 0.0 - 6.0 %    Basophils % 0.0 0.0 - 2.0 %    Neutrophils Absolute 7.29 1.80 - 7.30 E9/L    Lymphocytes Absolute 0.57 (L) 1.50 - 4.00 E9/L    Monocytes Absolute 0.32 0.10 - 0.95 E9/L    Eosinophils Absolute 0.00 (L) 0.05 - 0.50 E9/L    Basophils Absolute 0.00 0.00 - 0.20 E9/L    Hypochromia 1+     Poikilocytes 1+     Ovalocytes 1+     Target Cells 1+    POCT glucose   Result Value Ref Range    Glucose 143 mg/dL    QC OK? OK    POCT Glucose   Result Value Ref Range    Meter Glucose 143 (H) 74 - 99 mg/dL   POCT Glucose   Result Value Ref Range    Meter Glucose 202 (H) 74 - 99 mg/dL   POCT Glucose   Result Value Ref Range    Meter Glucose 198 (H) 74 - 99 mg/dL   POCT Glucose   Result Value Ref Range    Meter Glucose 152 (H) 74 - 99 mg/dL   POCT Glucose   Result Value Ref Range    Meter Glucose 203 (H) 74 - 99 mg/dL   EKG 12 Lead   Result Value Ref Range    Ventricular Rate 74 BPM    Atrial Rate 74 BPM    P-R Interval 148 ms    QRS Duration 72 ms    Q-T Interval 396 ms    QTc Calculation (Bazett) 439 ms    P Axis 72 degrees    R Axis 14 degrees    T Axis 20 degrees       RADIOLOGY:  XR CHEST (2 VW)   Final Result   No acute process. ------------------------- NURSING NOTES AND VITALS REVIEWED ---------------------------  Date / Time Roomed:  12/29/2022  1:34 PM  ED Bed Assignment:  [id number]    The nursing notes within the ED encounter and vital signs as below have been reviewed.      Patient Vitals for the past 24 hrs:   BP Temp Temp src Pulse Resp SpO2   12/31/22 0854 -- -- -- -- 20 --   12/31/22 0824 -- -- -- -- 20 --   12/31/22 0839 (!) 150/70 -- -- 89 -- --   12/31/22 0615 (!) 150/70 98.2 °F (36.8 °C) Oral 55 20 98 %   12/30/22 1933 (!) 121/58 98.5 °F (36.9 °C) Oral 70 20 96 %       Oxygen Saturation Interpretation: Normal    ------------------------------------------ PROGRESS NOTES ------------------------------------------  Re-evaluation(s):  Patients symptoms show no change  Repeat physical examination is not changed    Counseling:  I have spoken with the patient and discussed todays results, in addition to providing specific details for the plan of care and counseling regarding the diagnosis and prognosis. Their questions are answered at this time and they are agreeable with the plan of admission.    --------------------------------- ADDITIONAL PROVIDER NOTES ---------------------------------  Consultations:  Spoke with Dr. Michael Mae. Discussed case. They will admit the patient. This patient's ED course included: a personal history and physicial examination, multiple bedside re-evaluations, and cardiac monitoring    This patient has remained hemodynamically stable during their ED course. Diagnosis:  1. COPD exacerbation ([address]        Disposition:  Patient's disposition: Admit to telemetry  Patient's condition is stable.          Nicole Postal, DO  12/31/22 [id number]

## 2022-12-30 LAB
ADENOVIRUS BY PCR: NOT DETECTED
ALBUMIN SERPL-MCNC: 4 G/DL (ref 3.5–5.2)
ALP BLD-CCNC: 97 U/L (ref 35–104)
ALT SERPL-CCNC: 11 U/L (ref 0–32)
ANION GAP SERPL CALCULATED.3IONS-SCNC: 10 MMOL/L (ref 7–16)
ANISOCYTOSIS: ABNORMAL
AST SERPL-CCNC: 14 U/L (ref 0–31)
BASOPHILS ABSOLUTE: 0 E9/L (ref 0–0.2)
BASOPHILS RELATIVE PERCENT: 0 % (ref 0–2)
BILIRUB SERPL-MCNC: 0.3 MG/DL (ref 0–1.2)
BORDETELLA PARAPERTUSSIS BY PCR: NOT DETECTED
BORDETELLA PERTUSSIS BY PCR: NOT DETECTED
BUN BLDV-MCNC: 12 MG/DL (ref 6–23)
CALCIUM SERPL-MCNC: 9.2 MG/DL (ref 8.6–10.2)
CHLAMYDOPHILIA PNEUMONIAE BY PCR: NOT DETECTED
CHLORIDE BLD-SCNC: 104 MMOL/L (ref 98–107)
CHP ED QC CHECK: NORMAL
CO2: 28 MMOL/L (ref 22–29)
CORONAVIRUS 229E BY PCR: NOT DETECTED
CORONAVIRUS HKU1 BY PCR: NOT DETECTED
CORONAVIRUS NL63 BY PCR: NOT DETECTED
CORONAVIRUS OC43 BY PCR: NOT DETECTED
CREAT SERPL-MCNC: 0.7 MG/DL (ref 0.5–1)
EKG ATRIAL RATE: 74 BPM
EKG P AXIS: 72 DEGREES
EKG P-R INTERVAL: 148 MS
EKG Q-T INTERVAL: 396 MS
EKG QRS DURATION: 72 MS
EKG QTC CALCULATION (BAZETT): 439 MS
EKG R AXIS: 14 DEGREES
EKG T AXIS: 20 DEGREES
EKG VENTRICULAR RATE: 74 BPM
EOSINOPHILS ABSOLUTE: 0 E9/L (ref 0.05–0.5)
EOSINOPHILS RELATIVE PERCENT: 0 % (ref 0–6)
GFR SERPL CREATININE-BSD FRML MDRD: >60 ML/MIN/1.73
GLUCOSE BLD-MCNC: 139 MG/DL (ref 74–99)
GLUCOSE BLD-MCNC: 143 MG/DL
HCT VFR BLD CALC: 32 % (ref 34–48)
HEMOGLOBIN: 9.9 G/DL (ref 11.5–15.5)
HUMAN METAPNEUMOVIRUS BY PCR: NOT DETECTED
HUMAN RHINOVIRUS/ENTEROVIRUS BY PCR: NOT DETECTED
HYPOCHROMIA: ABNORMAL
INFLUENZA A BY PCR: NOT DETECTED
INFLUENZA B BY PCR: NOT DETECTED
LYMPHOCYTES ABSOLUTE: 0.53 E9/L (ref 1.5–4)
LYMPHOCYTES RELATIVE PERCENT: 8.8 % (ref 20–42)
MAGNESIUM: 2.3 MG/DL (ref 1.6–2.6)
MCH RBC QN AUTO: 24.8 PG (ref 26–35)
MCHC RBC AUTO-ENTMCNC: 30.9 % (ref 32–34.5)
MCV RBC AUTO: 80 FL (ref 80–99.9)
METER GLUCOSE: 143 MG/DL (ref 74–99)
METER GLUCOSE: 198 MG/DL (ref 74–99)
METER GLUCOSE: 202 MG/DL (ref 74–99)
MONOCYTES ABSOLUTE: 0.12 E9/L (ref 0.1–0.95)
MONOCYTES RELATIVE PERCENT: 1.8 % (ref 2–12)
MYCOPLASMA PNEUMONIAE BY PCR: NOT DETECTED
NEUTROPHILS ABSOLUTE: 5.31 E9/L (ref 1.8–7.3)
NEUTROPHILS RELATIVE PERCENT: 89.5 % (ref 43–80)
OVALOCYTES: ABNORMAL
PARAINFLUENZA VIRUS 1 BY PCR: NOT DETECTED
PARAINFLUENZA VIRUS 2 BY PCR: NOT DETECTED
PARAINFLUENZA VIRUS 3 BY PCR: NOT DETECTED
PARAINFLUENZA VIRUS 4 BY PCR: NOT DETECTED
PDW BLD-RTO: 15.4 FL (ref 11.5–15)
PHOSPHORUS: 2.3 MG/DL (ref 2.5–4.5)
PLATELET # BLD: 176 E9/L (ref 130–450)
PMV BLD AUTO: ABNORMAL FL (ref 7–12)
POIKILOCYTES: ABNORMAL
POLYCHROMASIA: ABNORMAL
POTASSIUM SERPL-SCNC: 4.2 MMOL/L (ref 3.5–5)
PROCALCITONIN: 0.07 NG/ML (ref 0–0.08)
RBC # BLD: 4 E12/L (ref 3.5–5.5)
RESPIRATORY SYNCYTIAL VIRUS BY PCR: NOT DETECTED
SARS-COV-2, PCR: NOT DETECTED
SODIUM BLD-SCNC: 142 MMOL/L (ref 132–146)
T4 FREE: 1.53 NG/DL (ref 0.93–1.7)
TARGET CELLS: ABNORMAL
TOTAL PROTEIN: 6.9 G/DL (ref 6.4–8.3)
TSH SERPL DL<=0.05 MIU/L-ACNC: 0.62 UIU/ML (ref 0.27–4.2)
WBC # BLD: 5.9 E9/L (ref 4.5–11.5)

## 2022-12-30 PROCEDURE — 2580000003 HC RX 258: Performed by: INTERNAL MEDICINE

## 2022-12-30 PROCEDURE — 84443 ASSAY THYROID STIM HORMONE: CPT

## 2022-12-30 PROCEDURE — 83735 ASSAY OF MAGNESIUM: CPT

## 2022-12-30 PROCEDURE — 85025 COMPLETE CBC W/AUTO DIFF WBC: CPT

## 2022-12-30 PROCEDURE — 6370000000 HC RX 637 (ALT 250 FOR IP): Performed by: INTERNAL MEDICINE

## 2022-12-30 PROCEDURE — 36415 COLL VENOUS BLD VENIPUNCTURE: CPT

## 2022-12-30 PROCEDURE — 1200000000 HC SEMI PRIVATE

## 2022-12-30 PROCEDURE — 93010 ELECTROCARDIOGRAM REPORT: CPT | Performed by: INTERNAL MEDICINE

## 2022-12-30 PROCEDURE — 80053 COMPREHEN METABOLIC PANEL: CPT

## 2022-12-30 PROCEDURE — 84145 PROCALCITONIN (PCT): CPT

## 2022-12-30 PROCEDURE — 6360000002 HC RX W HCPCS: Performed by: INTERNAL MEDICINE

## 2022-12-30 PROCEDURE — 94640 AIRWAY INHALATION TREATMENT: CPT

## 2022-12-30 PROCEDURE — 84100 ASSAY OF PHOSPHORUS: CPT

## 2022-12-30 PROCEDURE — 84439 ASSAY OF FREE THYROXINE: CPT

## 2022-12-30 PROCEDURE — 82962 GLUCOSE BLOOD TEST: CPT

## 2022-12-30 RX ORDER — HYDROCHLOROTHIAZIDE 12.5 MG/1
12.5 TABLET ORAL DAILY
Status: DISCONTINUED | OUTPATIENT
Start: 2022-12-30 | End: 2023-01-07 | Stop reason: HOSPADM

## 2022-12-30 RX ORDER — OXYCODONE HYDROCHLORIDE AND ACETAMINOPHEN 5; 325 MG/1; MG/1
1 TABLET ORAL EVERY 4 HOURS PRN
Status: DISCONTINUED | OUTPATIENT
Start: 2022-12-30 | End: 2023-01-07 | Stop reason: HOSPADM

## 2022-12-30 RX ORDER — CYCLOBENZAPRINE HCL 5 MG
10 TABLET ORAL 3 TIMES DAILY PRN
Status: DISCONTINUED | OUTPATIENT
Start: 2022-12-30 | End: 2023-01-03

## 2022-12-30 RX ORDER — OXYCODONE HYDROCHLORIDE AND ACETAMINOPHEN 5; 325 MG/1; MG/1
1 TABLET ORAL ONCE
Status: CANCELLED | OUTPATIENT
Start: 2022-12-30 | End: 2022-12-30

## 2022-12-30 RX ORDER — INSULIN LISPRO 100 [IU]/ML
0-4 INJECTION, SOLUTION INTRAVENOUS; SUBCUTANEOUS NIGHTLY
Status: DISCONTINUED | OUTPATIENT
Start: 2022-12-30 | End: 2023-01-07 | Stop reason: HOSPADM

## 2022-12-30 RX ORDER — INSULIN LISPRO 100 [IU]/ML
0-8 INJECTION, SOLUTION INTRAVENOUS; SUBCUTANEOUS
Status: DISCONTINUED | OUTPATIENT
Start: 2022-12-30 | End: 2023-01-07 | Stop reason: HOSPADM

## 2022-12-30 RX ORDER — OXYCODONE HYDROCHLORIDE AND ACETAMINOPHEN 5; 325 MG/1; MG/1
1 TABLET ORAL ONCE
Status: COMPLETED | OUTPATIENT
Start: 2022-12-30 | End: 2022-12-30

## 2022-12-30 RX ADMIN — BUDESONIDE 500 MCG: 0.5 SUSPENSION RESPIRATORY (INHALATION) at 04:51

## 2022-12-30 RX ADMIN — DICLOFENAC SODIUM 2 G: 10 GEL TOPICAL at 20:09

## 2022-12-30 RX ADMIN — IPRATROPIUM BROMIDE AND ALBUTEROL SULFATE 1 AMPULE: .5; 2.5 SOLUTION RESPIRATORY (INHALATION) at 08:37

## 2022-12-30 RX ADMIN — ACETAMINOPHEN 650 MG: 325 TABLET ORAL at 02:00

## 2022-12-30 RX ADMIN — IPRATROPIUM BROMIDE AND ALBUTEROL SULFATE 1 AMPULE: .5; 2.5 SOLUTION RESPIRATORY (INHALATION) at 20:39

## 2022-12-30 RX ADMIN — Medication 5 ML: at 12:23

## 2022-12-30 RX ADMIN — MONTELUKAST 10 MG: 10 TABLET, FILM COATED ORAL at 20:08

## 2022-12-30 RX ADMIN — LEVOTHYROXINE SODIUM 50 MCG: 0.05 TABLET ORAL at 06:06

## 2022-12-30 RX ADMIN — ALLOPURINOL 200 MG: 100 TABLET ORAL at 12:20

## 2022-12-30 RX ADMIN — METHYLPREDNISOLONE SODIUM SUCCINATE 40 MG: 40 INJECTION, POWDER, FOR SOLUTION INTRAMUSCULAR; INTRAVENOUS at 08:42

## 2022-12-30 RX ADMIN — OXYCODONE AND ACETAMINOPHEN 1 TABLET: 5; 325 TABLET ORAL at 20:08

## 2022-12-30 RX ADMIN — OXYCODONE AND ACETAMINOPHEN 1 TABLET: 5; 325 TABLET ORAL at 04:38

## 2022-12-30 RX ADMIN — METOPROLOL TARTRATE 25 MG: 25 TABLET, FILM COATED ORAL at 20:08

## 2022-12-30 RX ADMIN — IPRATROPIUM BROMIDE AND ALBUTEROL SULFATE 1 AMPULE: .5; 2.5 SOLUTION RESPIRATORY (INHALATION) at 12:57

## 2022-12-30 RX ADMIN — PANTOPRAZOLE SODIUM 40 MG: 40 TABLET, DELAYED RELEASE ORAL at 12:21

## 2022-12-30 RX ADMIN — Medication 10 ML: at 20:09

## 2022-12-30 RX ADMIN — IPRATROPIUM BROMIDE AND ALBUTEROL SULFATE 1 AMPULE: .5; 2.5 SOLUTION RESPIRATORY (INHALATION) at 00:59

## 2022-12-30 RX ADMIN — INSULIN LISPRO 2 UNITS: 100 INJECTION, SOLUTION INTRAVENOUS; SUBCUTANEOUS at 16:34

## 2022-12-30 RX ADMIN — PANTOPRAZOLE SODIUM 40 MG: 40 TABLET, DELAYED RELEASE ORAL at 20:08

## 2022-12-30 RX ADMIN — IPRATROPIUM BROMIDE AND ALBUTEROL SULFATE 1 AMPULE: .5; 2.5 SOLUTION RESPIRATORY (INHALATION) at 04:51

## 2022-12-30 RX ADMIN — HYDROCHLOROTHIAZIDE 12.5 MG: 12.5 TABLET ORAL at 12:21

## 2022-12-30 RX ADMIN — METHYLPREDNISOLONE SODIUM SUCCINATE 40 MG: 40 INJECTION, POWDER, FOR SOLUTION INTRAMUSCULAR; INTRAVENOUS at 04:38

## 2022-12-30 RX ADMIN — METOPROLOL TARTRATE 25 MG: 25 TABLET, FILM COATED ORAL at 12:20

## 2022-12-30 RX ADMIN — OXYCODONE AND ACETAMINOPHEN 1 TABLET: 5; 325 TABLET ORAL at 13:55

## 2022-12-30 RX ADMIN — ENOXAPARIN SODIUM 30 MG: 100 INJECTION SUBCUTANEOUS at 12:20

## 2022-12-30 RX ADMIN — ENOXAPARIN SODIUM 30 MG: 100 INJECTION SUBCUTANEOUS at 20:07

## 2022-12-30 RX ADMIN — METHYLPREDNISOLONE SODIUM SUCCINATE 40 MG: 40 INJECTION, POWDER, FOR SOLUTION INTRAMUSCULAR; INTRAVENOUS at 15:17

## 2022-12-30 ASSESSMENT — PAIN DESCRIPTION - ORIENTATION
ORIENTATION: LOWER;MID
ORIENTATION: LOWER

## 2022-12-30 ASSESSMENT — PAIN SCALES - GENERAL
PAINLEVEL_OUTOF10: 7
PAINLEVEL_OUTOF10: 8
PAINLEVEL_OUTOF10: 7
PAINLEVEL_OUTOF10: 8
PAINLEVEL_OUTOF10: 8
PAINLEVEL_OUTOF10: 10

## 2022-12-30 ASSESSMENT — PAIN DESCRIPTION - DESCRIPTORS
DESCRIPTORS: DISCOMFORT;SORE;DULL
DESCRIPTORS: ACHING;DISCOMFORT;TENDER

## 2022-12-30 ASSESSMENT — PAIN DESCRIPTION - LOCATION
LOCATION: BACK

## 2022-12-30 NOTE — PROGRESS NOTES
Department of Internal Medicine      PCP: Jesus Bernabe DO  Admitting Physician: Dr. Escobar Skaggs  Consultants:   Date of Service: 12/29/2022    CHIEF COMPLAINT:  sob    HISTORY OF PRESENT ILLNESS:    Patient is 49-year-old female who presented to the ED due to shortness of breath. patient admits to associated cough but denies any sputum production. She does admit to subjective fever and chills. She does have history of sleep apnea but does not use CPAP.    12/30/2022  Patient seen examined on monitored bed. Patient states he feels little bit better today. Patient denies any chest or abdominal pain. Patient still has shortness of breath with activity. BUN/creatinine 12/0.7 with normal liver enzymes. WBC 5.9 hemoglobin 9.9. Temperature is 97.8 with heart rate 82 blood pressure 153/93. O2 sat high percent on 2 L. PAST MEDICAL Hx:  Past Medical History:   Diagnosis Date    Arthritis     Asthma     DM (diabetes mellitus) ([address]     HTN (hypertension)     Morbid obesity due to excess calories ([address]     Obstructive sleep apnea syndrome     Pulmonary hypertension ([address]     Retention of urine        PAST SURGICAL Hx:   Past Surgical History:   Procedure Laterality Date    APPENDECTOMY  1985    CATARACT REMOVAL Bilateral 2017    [address] (CERVIX STATUS UNKNOWN)  2017    JOINT REPLACEMENT Left 2009    knee    NERVE BLOCK N/A 12/03/2014    cervical #1    NERVE BLOCK N/A 12/10/2014    cervical epidural #2    NERVE BLOCK  12/17/2014    cerical epidural #3    UPPER GASTROINTESTINAL ENDOSCOPY N/A 10/17/2022    EGD BIOPSY performed by Heraclio Coleman MD at [address] Hx:  Family History   Problem Relation Age of Onset    Cancer Father     Diabetes Mother     Hypertension Mother        HOME MEDICATIONS:  Prior to Admission medications    Medication Sig Start Date End Date Taking?  Authorizing Provider   lidocaine (LIDODERM) 5 % Place 1 patch onto the skin daily 12 hours on, 12 hours off. Historical Provider, MD   pantoprazole (PROTONIX) 40 MG tablet Take 40 mg by mouth 2 times daily    Historical Provider, MD   levothyroxine (SYNTHROID) 25 MCG tablet Take 50 mcg by mouth Daily    Historical Provider, MD   fluticasone 200 MCG/ACT AEPB Inhale 1 puff into the lungs daily    Historical Provider, MD   albuterol sulfate  (90 Base) MCG/ACT inhaler Inhale 2 puffs into the lungs as needed for Wheezing    Historical Provider, MD   metFORMIN (GLUCOPHAGE) 500 MG tablet Take 500 mg by mouth 2 times daily (with meals)    Historical Provider, MD   allopurinol (ZYLOPRIM) 100 MG tablet Take 200 mg by mouth daily    Historical Provider, MD   hydrochlorothiazide (MICROZIDE) 12.5 MG capsule Take 12.5 mg by mouth daily. Historical Provider, MD   oxyCODONE-acetaminophen (PERCOCET) 5-325 MG per tablet Take 1 tablet by mouth every 6 hours as needed for Pain. Historical Provider, MD   montelukast (SINGULAIR) 10 MG tablet Take 10 mg by mouth nightly. Historical Provider, MD   metoprolol (LOPRESSOR) 25 MG tablet Take 25 mg by mouth 2 times daily. Historical Provider, MD   cyclobenzaprine (FLEXERIL) 10 MG tablet Take 10 mg by mouth 3 times daily as needed for Muscle spasms    Historical Provider, MD   diclofenac sodium (VOLTAREN) 1 % GEL Apply 2 g topically 2 times daily.     Historical Provider, MD       ALLERGIES:  Alvy Polite [aspirin], Ibuprofen, Norco [hydrocodone-acetaminophen], and Ultram [tramadol]    SOCIAL Hx:  Social History     Socioeconomic History    Marital status: Single     Spouse name: Not on file    Number of children: Not on file    Years of education: Not on file    Highest education level: Not on file   Occupational History    Not on file   Tobacco Use    Smoking status: Never    Smokeless tobacco: Never   Vaping Use    Vaping Use: Unknown   Substance and Sexual Activity    Alcohol use: No    Drug use: No    Sexual activity: Not on file   Other Topics Concern    Not on file   Social History Narrative    Not on file     Social Determinants of Health     Financial Resource Strain: Not on file   Food Insecurity: Not on file   Transportation Needs: Not on file   Physical Activity: Not on file   Stress: Not on file   Social Connections: Not on file   Intimate Partner Violence: Not on file   Housing Stability: Not on file       ROS: Positive in bold  General:   Denies chills, fatigue, fever, malaise, night sweats or weight loss    Psychological:   Denies anxiety, disorientation or hallucinations    ENT:    Denies epistaxis, headaches, vertigo or visual changes    Cardiovascular:   Denies any chest pain, irregular heartbeats, or palpitations. No paroxysmal nocturnal dyspnea. Respiratory:   Denies shortness of breath, coughing, sputum production, hemoptysis, or wheezing. No orthopnea. Gastrointestinal:   Denies nausea, vomiting, diarrhea, or constipation. Denies any abdominal pain. Denies change in bowel habits or stools. Genito-Urinary:    Denies any urgency, frequency, hematuria. Voiding without difficulty. Musculoskeletal:   Denies joint pain, joint stiffness, joint swelling or muscle pain    Neurology:    Denies any headache or focal neurological deficits. No weakness or paresthesia. Derm:    Denies any rashes, ulcers, or excoriations. Denies bruising. Extremities:   Denies any lower extremity swelling or edema. PHYSICAL EXAM: Abnormal findings noted  VITALS:  Vitals:    12/30/22 1220   BP: (!) 153/98   Pulse: 82   Resp: 19   Temp:    SpO2: 100%         CONSTITUTIONAL:    Awake, alert, cooperative, no apparent distress, and appears stated age    EYES:     EOMI, sclera clear, conjunctiva normal    ENT:    Normocephalic, atraumatic,  External ears without lesions.      NECK:    Supple, symmetrical, trachea midline, no JVD    HEMATOLOGIC/LYMPHATICS:    No cervical lymphadenopathy and no supraclavicular lymphadenopathy    LUNGS: Symmetric. No increased work of breathing, good air exchange, clear to auscultation bilaterally, no wheezes, rhonchi, or rales,   Patient has diminished breath sounds bilaterally    CARDIOVASCULAR:    Normal apical impulse, regular rate and rhythm, normal S1 and S2, no S3 or S4, and no murmur noted    ABDOMEN:    soft, non-distended, non-tender    MUSCULOSKELETAL:    There is no redness, warmth, or swelling of the joints. NEUROLOGIC:    Awake, alert, oriented to name, place and time. SKIN:    No bruising or bleeding. No redness, warmth, or swelling    EXTREMITIES:    Peripheral pulses present. No edema, cyanosis, or swelling.     LINES/CATHETERS     LABORATORY DATA:  CBC with Differential:    Lab Results   Component Value Date/Time    WBC 5.9 12/30/2022 05:19 AM    RBC 4.00 12/30/2022 05:19 AM    HGB 9.9 12/30/2022 05:19 AM    HCT 32.0 12/30/2022 05:19 AM     12/30/2022 05:19 AM    MCV 80.0 12/30/2022 05:19 AM    MCH 24.8 12/30/2022 05:19 AM    MCHC 30.9 12/30/2022 05:19 AM    RDW 15.4 12/30/2022 05:19 AM    LYMPHOPCT 8.8 12/30/2022 05:19 AM    MONOPCT 1.8 12/30/2022 05:19 AM    BASOPCT 0.0 12/30/2022 05:19 AM    MONOSABS 0.12 12/30/2022 05:19 AM    LYMPHSABS 0.53 12/30/2022 05:19 AM    EOSABS 0.00 12/30/2022 05:19 AM    BASOSABS 0.00 12/30/2022 05:19 AM     CMP:    Lab Results   Component Value Date/Time     12/30/2022 05:19 AM    K 4.2 12/30/2022 05:19 AM     12/30/2022 05:19 AM    CO2 28 12/30/2022 05:19 AM    BUN 12 12/30/2022 05:19 AM    CREATININE 0.7 12/30/2022 05:19 AM    GFRAA >60 10/17/2022 11:38 AM    LABGLOM >60 12/30/2022 05:19 AM    GLUCOSE 143 12/30/2022 12:42 PM    PROT 6.9 12/30/2022 05:19 AM    LABALBU 4.0 12/30/2022 05:19 AM    CALCIUM 9.2 12/30/2022 05:19 AM    BILITOT 0.3 12/30/2022 05:19 AM    ALKPHOS 97 12/30/2022 05:19 AM    AST 14 12/30/2022 05:19 AM    ALT 11 12/30/2022 05:19 AM       ASSESSMENT/PLAN:  Acute COPD exacerbation   Acute on chronic chronic normocytic anemia  Asthma  sleep apnea without use of CPAP  Mild pulmonary hypertension  Mild tricuspid regurgitation  Non-insulin-dependent diabetes mellitus  Hypertension    Patient presented with shortness of breath. Patient found to be in acute COPD exacerbation. Patient placed on nebulized breathing treatments and IV steroids. Viral respiratory panel ordered. Continue to monitor pulse ox.     Home medication reviewed  O2 saturation on room air at rest and if greater than 89% with activity and monitor heart rate    Nolan Esqueda DO  1:02 PM  12/30/2022

## 2022-12-30 NOTE — PROGRESS NOTES
Patient is 27-year-old female who presented to the ED due to shortness of breath patient admits to associated cough but denies any sputum production. She does admit to subjective fever and chills. She does have history of sleep apnea but does not use CPAP. Patient presented with shortness of breath. Patient found to be in acute COPD exacerbation. Patient replaced on evaluating treatments and IV steroids. Viral respiratory panel ordered. Continue to monitor pulse ox.

## 2022-12-30 NOTE — PROGRESS NOTES
Pharmacist Review and Automatic Dose Adjustment of Prophylactic Enoxaparin    Reviewed reason(s) for admission/hospital problem list    The reviewing pharmacist has made an adjustment to the ordered enoxaparin dose or converted to UFH per the approved [facility] protocol and table as identified below. Marquita Morrell is a 57 y.o. female. Recent Labs     12/29/22  1719   CREATININE 0.8       Estimated Creatinine Clearance: 95 mL/min (based on SCr of 0.8 mg/dL). Recent Labs     12/29/22  1409   HGB 9.7*   HCT 33.7*        No results for input(s): INR in the last 72 hours.     Height:   Ht Readings from Last 1 Encounters:   11/11/22 5' 2\" (1.575 m)     Weight:  Wt Readings from Last 1 Encounters:   12/29/22 (!) 302 lb 11.1 oz (137.3 kg)               Plan: Based upon the patient's weight and renal function    Ordered: Enoxaparin 40mg Daily    Changed/converted to    New Order: Enoxaparin 30mg SUBQ BID      Thank you,  Sheryl Downing [facility]  12/29/2022, 8:13 PM

## 2022-12-31 LAB
ALBUMIN SERPL-MCNC: 3.9 G/DL (ref 3.5–5.2)
ALP BLD-CCNC: 90 U/L (ref 35–104)
ALT SERPL-CCNC: 10 U/L (ref 0–32)
ANION GAP SERPL CALCULATED.3IONS-SCNC: 10 MMOL/L (ref 7–16)
AST SERPL-CCNC: 13 U/L (ref 0–31)
BASOPHILS ABSOLUTE: 0 E9/L (ref 0–0.2)
BASOPHILS RELATIVE PERCENT: 0 % (ref 0–2)
BILIRUB SERPL-MCNC: 0.3 MG/DL (ref 0–1.2)
BUN BLDV-MCNC: 18 MG/DL (ref 6–23)
CALCIUM SERPL-MCNC: 8.7 MG/DL (ref 8.6–10.2)
CHLORIDE BLD-SCNC: 102 MMOL/L (ref 98–107)
CO2: 29 MMOL/L (ref 22–29)
CREAT SERPL-MCNC: 0.8 MG/DL (ref 0.5–1)
EOSINOPHILS ABSOLUTE: 0 E9/L (ref 0.05–0.5)
EOSINOPHILS RELATIVE PERCENT: 0 % (ref 0–6)
GFR SERPL CREATININE-BSD FRML MDRD: >60 ML/MIN/1.73
GLUCOSE BLD-MCNC: 151 MG/DL (ref 74–99)
HCT VFR BLD CALC: 32.3 % (ref 34–48)
HEMOGLOBIN: 9.7 G/DL (ref 11.5–15.5)
HYPOCHROMIA: ABNORMAL
LYMPHOCYTES ABSOLUTE: 0.57 E9/L (ref 1.5–4)
LYMPHOCYTES RELATIVE PERCENT: 7 % (ref 20–42)
MCH RBC QN AUTO: 24.3 PG (ref 26–35)
MCHC RBC AUTO-ENTMCNC: 30 % (ref 32–34.5)
MCV RBC AUTO: 80.8 FL (ref 80–99.9)
METER GLUCOSE: 152 MG/DL (ref 74–99)
METER GLUCOSE: 164 MG/DL (ref 74–99)
METER GLUCOSE: 203 MG/DL (ref 74–99)
METER GLUCOSE: 218 MG/DL (ref 74–99)
MONOCYTES ABSOLUTE: 0.32 E9/L (ref 0.1–0.95)
MONOCYTES RELATIVE PERCENT: 3.5 % (ref 2–12)
NEUTROPHILS ABSOLUTE: 7.29 E9/L (ref 1.8–7.3)
NEUTROPHILS RELATIVE PERCENT: 89.6 % (ref 43–80)
OVALOCYTES: ABNORMAL
PDW BLD-RTO: 15.7 FL (ref 11.5–15)
PLATELET # BLD: 204 E9/L (ref 130–450)
PMV BLD AUTO: 13.7 FL (ref 7–12)
POIKILOCYTES: ABNORMAL
POTASSIUM SERPL-SCNC: 4.1 MMOL/L (ref 3.5–5)
RBC # BLD: 4 E12/L (ref 3.5–5.5)
SODIUM BLD-SCNC: 141 MMOL/L (ref 132–146)
TARGET CELLS: ABNORMAL
TOTAL PROTEIN: 6.8 G/DL (ref 6.4–8.3)
WBC # BLD: 8.1 E9/L (ref 4.5–11.5)

## 2022-12-31 PROCEDURE — 82962 GLUCOSE BLOOD TEST: CPT

## 2022-12-31 PROCEDURE — 6370000000 HC RX 637 (ALT 250 FOR IP): Performed by: INTERNAL MEDICINE

## 2022-12-31 PROCEDURE — 85025 COMPLETE CBC W/AUTO DIFF WBC: CPT

## 2022-12-31 PROCEDURE — 1200000000 HC SEMI PRIVATE

## 2022-12-31 PROCEDURE — 2700000000 HC OXYGEN THERAPY PER DAY

## 2022-12-31 PROCEDURE — 36415 COLL VENOUS BLD VENIPUNCTURE: CPT

## 2022-12-31 PROCEDURE — 80053 COMPREHEN METABOLIC PANEL: CPT

## 2022-12-31 PROCEDURE — 6360000002 HC RX W HCPCS: Performed by: INTERNAL MEDICINE

## 2022-12-31 PROCEDURE — 2580000003 HC RX 258: Performed by: INTERNAL MEDICINE

## 2022-12-31 PROCEDURE — 94640 AIRWAY INHALATION TREATMENT: CPT

## 2022-12-31 RX ADMIN — METHYLPREDNISOLONE SODIUM SUCCINATE 40 MG: 40 INJECTION, POWDER, FOR SOLUTION INTRAMUSCULAR; INTRAVENOUS at 16:36

## 2022-12-31 RX ADMIN — ENOXAPARIN SODIUM 30 MG: 100 INJECTION SUBCUTANEOUS at 20:06

## 2022-12-31 RX ADMIN — BUDESONIDE 500 MCG: 0.5 SUSPENSION RESPIRATORY (INHALATION) at 06:47

## 2022-12-31 RX ADMIN — METOPROLOL TARTRATE 25 MG: 25 TABLET, FILM COATED ORAL at 20:06

## 2022-12-31 RX ADMIN — PANTOPRAZOLE SODIUM 40 MG: 40 TABLET, DELAYED RELEASE ORAL at 08:22

## 2022-12-31 RX ADMIN — LEVOTHYROXINE SODIUM 50 MCG: 0.05 TABLET ORAL at 05:32

## 2022-12-31 RX ADMIN — MONTELUKAST 10 MG: 10 TABLET, FILM COATED ORAL at 20:06

## 2022-12-31 RX ADMIN — METOPROLOL TARTRATE 25 MG: 25 TABLET, FILM COATED ORAL at 08:22

## 2022-12-31 RX ADMIN — HYDROCHLOROTHIAZIDE 12.5 MG: 12.5 TABLET ORAL at 08:22

## 2022-12-31 RX ADMIN — CYCLOBENZAPRINE HYDROCHLORIDE 10 MG: 5 TABLET, FILM COATED ORAL at 01:50

## 2022-12-31 RX ADMIN — INSULIN LISPRO 2 UNITS: 100 INJECTION, SOLUTION INTRAVENOUS; SUBCUTANEOUS at 16:17

## 2022-12-31 RX ADMIN — INSULIN LISPRO 2 UNITS: 100 INJECTION, SOLUTION INTRAVENOUS; SUBCUTANEOUS at 11:45

## 2022-12-31 RX ADMIN — BUDESONIDE 500 MCG: 0.5 SUSPENSION RESPIRATORY (INHALATION) at 18:12

## 2022-12-31 RX ADMIN — CYCLOBENZAPRINE HYDROCHLORIDE 10 MG: 5 TABLET, FILM COATED ORAL at 23:49

## 2022-12-31 RX ADMIN — PANTOPRAZOLE SODIUM 40 MG: 40 TABLET, DELAYED RELEASE ORAL at 20:06

## 2022-12-31 RX ADMIN — IPRATROPIUM BROMIDE AND ALBUTEROL SULFATE 1 AMPULE: .5; 2.5 SOLUTION RESPIRATORY (INHALATION) at 00:42

## 2022-12-31 RX ADMIN — IPRATROPIUM BROMIDE AND ALBUTEROL SULFATE 1 AMPULE: .5; 2.5 SOLUTION RESPIRATORY (INHALATION) at 18:12

## 2022-12-31 RX ADMIN — METHYLPREDNISOLONE SODIUM SUCCINATE 40 MG: 40 INJECTION, POWDER, FOR SOLUTION INTRAMUSCULAR; INTRAVENOUS at 01:18

## 2022-12-31 RX ADMIN — METHYLPREDNISOLONE SODIUM SUCCINATE 40 MG: 40 INJECTION, POWDER, FOR SOLUTION INTRAMUSCULAR; INTRAVENOUS at 22:21

## 2022-12-31 RX ADMIN — DICLOFENAC SODIUM 2 G: 10 GEL TOPICAL at 08:23

## 2022-12-31 RX ADMIN — DICLOFENAC SODIUM 2 G: 10 GEL TOPICAL at 20:07

## 2022-12-31 RX ADMIN — IPRATROPIUM BROMIDE AND ALBUTEROL SULFATE 1 AMPULE: .5; 2.5 SOLUTION RESPIRATORY (INHALATION) at 06:47

## 2022-12-31 RX ADMIN — OXYCODONE AND ACETAMINOPHEN 1 TABLET: 5; 325 TABLET ORAL at 08:24

## 2022-12-31 RX ADMIN — IPRATROPIUM BROMIDE AND ALBUTEROL SULFATE 1 AMPULE: .5; 2.5 SOLUTION RESPIRATORY (INHALATION) at 22:16

## 2022-12-31 RX ADMIN — OXYCODONE AND ACETAMINOPHEN 1 TABLET: 5; 325 TABLET ORAL at 03:38

## 2022-12-31 RX ADMIN — Medication 10 ML: at 20:07

## 2022-12-31 RX ADMIN — OXYCODONE AND ACETAMINOPHEN 1 TABLET: 5; 325 TABLET ORAL at 17:35

## 2022-12-31 RX ADMIN — OXYCODONE AND ACETAMINOPHEN 1 TABLET: 5; 325 TABLET ORAL at 23:48

## 2022-12-31 RX ADMIN — METHYLPREDNISOLONE SODIUM SUCCINATE 40 MG: 40 INJECTION, POWDER, FOR SOLUTION INTRAMUSCULAR; INTRAVENOUS at 05:32

## 2022-12-31 RX ADMIN — Medication 10 ML: at 08:24

## 2022-12-31 RX ADMIN — ALLOPURINOL 200 MG: 100 TABLET ORAL at 08:22

## 2022-12-31 RX ADMIN — ENOXAPARIN SODIUM 30 MG: 100 INJECTION SUBCUTANEOUS at 08:23

## 2022-12-31 RX ADMIN — IPRATROPIUM BROMIDE AND ALBUTEROL SULFATE 1 AMPULE: .5; 2.5 SOLUTION RESPIRATORY (INHALATION) at 09:40

## 2022-12-31 RX ADMIN — IPRATROPIUM BROMIDE AND ALBUTEROL SULFATE 1 AMPULE: .5; 2.5 SOLUTION RESPIRATORY (INHALATION) at 12:48

## 2022-12-31 ASSESSMENT — PAIN SCALES - GENERAL
PAINLEVEL_OUTOF10: 7
PAINLEVEL_OUTOF10: 7
PAINLEVEL_OUTOF10: 4
PAINLEVEL_OUTOF10: 5
PAINLEVEL_OUTOF10: 8
PAINLEVEL_OUTOF10: 7

## 2022-12-31 ASSESSMENT — PAIN DESCRIPTION - DESCRIPTORS
DESCRIPTORS: ACHING;DISCOMFORT;SORE
DESCRIPTORS: ACHING;DISCOMFORT;DULL
DESCRIPTORS: GNAWING;TENDER
DESCRIPTORS: ACHING;DISCOMFORT;SORE

## 2022-12-31 ASSESSMENT — PAIN - FUNCTIONAL ASSESSMENT
PAIN_FUNCTIONAL_ASSESSMENT: ACTIVITIES ARE NOT PREVENTED

## 2022-12-31 ASSESSMENT — PAIN DESCRIPTION - LOCATION
LOCATION: BACK
LOCATION: BACK
LOCATION: BUTTOCKS
LOCATION: BACK

## 2022-12-31 ASSESSMENT — PAIN DESCRIPTION - ORIENTATION
ORIENTATION: LEFT;LOWER
ORIENTATION: LOWER
ORIENTATION: LOWER

## 2022-12-31 NOTE — PROGRESS NOTES
Department of Internal Medicine      PCP: Yuliana Casas DO  Admitting Physician: Dr. Cora Nagy  Consultants:   Date of Service: 12/29/2022    CHIEF COMPLAINT:  sob    HISTORY OF PRESENT ILLNESS:    Patient is 49-year-old female who presented to the ED due to shortness of breath. patient admits to associated cough but denies any sputum production. She does admit to subjective fever and chills. She does have history of sleep apnea but does not use CPAP.    12/30/2022  Patient seen examined on monitored bed. Patient states he feels little bit better today. Patient denies any chest or abdominal pain. Patient still has shortness of breath with activity. BUN/creatinine 12/0.7 with normal liver enzymes. WBC 5.9 hemoglobin 9.9. Temperature is 97.8 with heart rate 82 blood pressure 153/93. O2 sat high percent on 2 L.    12/31/2022  Patient seen examined on monitored bed. Patient still feels short of breath with activity. Patient overall feels better. BUN/creatinine was 18/0.8 with normal electrolytes. Liver enzymes normal with WBC 8.1 hemoglobin 9.7. Temperature is 98.2 with heart rate 89 blood pressure 150/70. O2 sat 98% on 2 L nasal cannula. Lung auscultation does not show any expiratory wheezing now but just has some decreased coarse breath sounds.     PAST MEDICAL Hx:  Past Medical History:   Diagnosis Date    Arthritis     Asthma     DM (diabetes mellitus) ([address]     HTN (hypertension)     Morbid obesity due to excess calories ([address]     Obstructive sleep apnea syndrome     Pulmonary hypertension ([address]     Retention of urine        PAST SURGICAL Hx:   Past Surgical History:   Procedure Laterality Date    APPENDECTOMY  1985    CATARACT REMOVAL Bilateral 2017    [address] (CERVIX STATUS UNKNOWN)  2017    JOINT REPLACEMENT Left 2009    knee    NERVE BLOCK N/A 12/03/2014    cervical #1    NERVE BLOCK N/A 12/10/2014    cervical epidural #2    NERVE BLOCK 12/17/2014    cerical epidural #3    UPPER GASTROINTESTINAL ENDOSCOPY N/A 10/17/2022    EGD BIOPSY performed by Bonnie Bhatia MD at [address] Hx:  Family History   Problem Relation Age of Onset    Cancer Father     Diabetes Mother     Hypertension Mother        HOME MEDICATIONS:  Prior to Admission medications    Medication Sig Start Date End Date Taking? Authorizing Provider   lidocaine (LIDODERM) 5 % Place 1 patch onto the skin daily 12 hours on, 12 hours off. Historical Provider, MD   pantoprazole (PROTONIX) 40 MG tablet Take 40 mg by mouth 2 times daily    Historical Provider, MD   levothyroxine (SYNTHROID) 25 MCG tablet Take 50 mcg by mouth Daily    Historical Provider, MD   fluticasone 200 MCG/ACT AEPB Inhale 1 puff into the lungs daily    Historical Provider, MD   albuterol sulfate  (90 Base) MCG/ACT inhaler Inhale 2 puffs into the lungs as needed for Wheezing    Historical Provider, MD   metFORMIN (GLUCOPHAGE) 500 MG tablet Take 500 mg by mouth 2 times daily (with meals)    Historical Provider, MD   allopurinol (ZYLOPRIM) 100 MG tablet Take 200 mg by mouth daily    Historical Provider, MD   hydrochlorothiazide (MICROZIDE) 12.5 MG capsule Take 12.5 mg by mouth daily. Historical Provider, MD   oxyCODONE-acetaminophen (PERCOCET) 5-325 MG per tablet Take 1 tablet by mouth every 6 hours as needed for Pain. Historical Provider, MD   montelukast (SINGULAIR) 10 MG tablet Take 10 mg by mouth nightly. Historical Provider, MD   metoprolol (LOPRESSOR) 25 MG tablet Take 25 mg by mouth 2 times daily. Historical Provider, MD   cyclobenzaprine (FLEXERIL) 10 MG tablet Take 10 mg by mouth 3 times daily as needed for Muscle spasms    Historical Provider, MD   diclofenac sodium (VOLTAREN) 1 % GEL Apply 2 g topically 2 times daily.     Historical Provider, MD       ALLERGIES:  Joanne Headings [aspirin], Ibuprofen, Norco [hydrocodone-acetaminophen], and Ultram [tramadol]    SOCIAL Hx:  Social History     Socioeconomic History    Marital status: Single     Spouse name: Not on file    Number of children: Not on file    Years of education: Not on file    Highest education level: Not on file   Occupational History    Not on file   Tobacco Use    Smoking status: Never    Smokeless tobacco: Never   Vaping Use    Vaping Use: Unknown   Substance and Sexual Activity    Alcohol use: No    Drug use: No    Sexual activity: Not on file   Other Topics Concern    Not on file   Social History Narrative    Not on file     Social Determinants of Health     Financial Resource Strain: Not on file   Food Insecurity: Not on file   Transportation Needs: Not on file   Physical Activity: Not on file   Stress: Not on file   Social Connections: Not on file   Intimate Partner Violence: Not on file   Housing Stability: Not on file       ROS: Positive in bold  General:   Denies chills, fatigue, fever, malaise, night sweats or weight loss    Psychological:   Denies anxiety, disorientation or hallucinations    ENT:    Denies epistaxis, headaches, vertigo or visual changes    Cardiovascular:   Denies any chest pain, irregular heartbeats, or palpitations. No paroxysmal nocturnal dyspnea. Respiratory:   Denies shortness of breath, coughing, sputum production, hemoptysis, or wheezing. No orthopnea. Gastrointestinal:   Denies nausea, vomiting, diarrhea, or constipation. Denies any abdominal pain. Denies change in bowel habits or stools. Genito-Urinary:    Denies any urgency, frequency, hematuria. Voiding without difficulty. Musculoskeletal:   Denies joint pain, joint stiffness, joint swelling or muscle pain    Neurology:    Denies any headache or focal neurological deficits. No weakness or paresthesia. Derm:    Denies any rashes, ulcers, or excoriations. Denies bruising. Extremities:   Denies any lower extremity swelling or edema.       PHYSICAL EXAM: Abnormal findings noted  VITALS:  Vitals:    12/31/22 0824   BP: Pulse:    Resp: 20   Temp:    SpO2:          CONSTITUTIONAL:    Awake, alert, cooperative, no apparent distress, and appears stated age    EYES:     EOMI, sclera clear, conjunctiva normal    ENT:    Normocephalic, atraumatic,  External ears without lesions. NECK:    Supple, symmetrical, trachea midline, no JVD    HEMATOLOGIC/LYMPHATICS:    No cervical lymphadenopathy and no supraclavicular lymphadenopathy    LUNGS:    Symmetric. No increased work of breathing, good air exchange, clear to auscultation bilaterally, no wheezes, rhonchi, or rales,   Patient has diminished breath sounds bilaterally    CARDIOVASCULAR:    Normal apical impulse, regular rate and rhythm, normal S1 and S2, no S3 or S4, and no murmur noted    ABDOMEN:    soft, non-distended, non-tender    MUSCULOSKELETAL:    There is no redness, warmth, or swelling of the joints. NEUROLOGIC:    Awake, alert, oriented to name, place and time. SKIN:    No bruising or bleeding. No redness, warmth, or swelling    EXTREMITIES:    Peripheral pulses present. No edema, cyanosis, or swelling.     LINES/CATHETERS     LABORATORY DATA:  CBC with Differential:    Lab Results   Component Value Date/Time    WBC 8.1 12/31/2022 05:47 AM    RBC 4.00 12/31/2022 05:47 AM    HGB 9.7 12/31/2022 05:47 AM    HCT 32.3 12/31/2022 05:47 AM     12/31/2022 05:47 AM    MCV 80.8 12/31/2022 05:47 AM    MCH 24.3 12/31/2022 05:47 AM    MCHC 30.0 12/31/2022 05:47 AM    RDW 15.7 12/31/2022 05:47 AM    LYMPHOPCT 7.0 12/31/2022 05:47 AM    MONOPCT 3.5 12/31/2022 05:47 AM    BASOPCT 0.0 12/31/2022 05:47 AM    MONOSABS 0.32 12/31/2022 05:47 AM    LYMPHSABS 0.57 12/31/2022 05:47 AM    EOSABS 0.00 12/31/2022 05:47 AM    BASOSABS 0.00 12/31/2022 05:47 AM     CMP:    Lab Results   Component Value Date/Time     12/31/2022 05:47 AM    K 4.1 12/31/2022 05:47 AM     12/31/2022 05:47 AM    CO2 29 12/31/2022 05:47 AM    BUN 18 12/31/2022 05:47 AM    CREATININE 0.8 12/31/2022 05:47 AM    GFRAA >60 10/17/2022 11:38 AM    LABGLOM >60 12/31/2022 05:47 AM    GLUCOSE 151 12/31/2022 05:47 AM    PROT 6.8 12/31/2022 05:47 AM    LABALBU 3.9 12/31/2022 05:47 AM    CALCIUM 8.7 12/31/2022 05:47 AM    BILITOT 0.3 12/31/2022 05:47 AM    ALKPHOS 90 12/31/2022 05:47 AM    AST 13 12/31/2022 05:47 AM    ALT 10 12/31/2022 05:47 AM       ASSESSMENT/PLAN:  Acute COPD exacerbation   Acute on chronic chronic normocytic anemia  Asthma  sleep apnea without use of CPAP  Mild pulmonary hypertension  Mild tricuspid regurgitation  Non-insulin-dependent diabetes mellitus  Hypertension    Patient presented with shortness of breath. Patient found to be in acute COPD exacerbation. Patient placed on nebulized breathing treatments and IV steroids. Viral respiratory panel ordered. Continue to monitor pulse ox.     Home medication reviewed  O2 saturation on room air at rest and if greater than 89% with activity and monitor heart rate    Darryn Barajas DO  10:00 AM  12/31/2022

## 2023-01-01 LAB
ALBUMIN SERPL-MCNC: 3.8 G/DL (ref 3.5–5.2)
ALP BLD-CCNC: 89 U/L (ref 35–104)
ALT SERPL-CCNC: 13 U/L (ref 0–32)
ANION GAP SERPL CALCULATED.3IONS-SCNC: 10 MMOL/L (ref 7–16)
ANISOCYTOSIS: ABNORMAL
AST SERPL-CCNC: 13 U/L (ref 0–31)
BASOPHILS ABSOLUTE: 0 E9/L (ref 0–0.2)
BASOPHILS RELATIVE PERCENT: 0 % (ref 0–2)
BILIRUB SERPL-MCNC: 0.2 MG/DL (ref 0–1.2)
BUN BLDV-MCNC: 27 MG/DL (ref 6–23)
CALCIUM SERPL-MCNC: 8.5 MG/DL (ref 8.6–10.2)
CHLORIDE BLD-SCNC: 104 MMOL/L (ref 98–107)
CO2: 28 MMOL/L (ref 22–29)
CREAT SERPL-MCNC: 0.9 MG/DL (ref 0.5–1)
EOSINOPHILS ABSOLUTE: 0 E9/L (ref 0.05–0.5)
EOSINOPHILS RELATIVE PERCENT: 0 % (ref 0–6)
GFR SERPL CREATININE-BSD FRML MDRD: >60 ML/MIN/1.73
GLUCOSE BLD-MCNC: 204 MG/DL (ref 74–99)
HCT VFR BLD CALC: 33.2 % (ref 34–48)
HEMOGLOBIN: 9.6 G/DL (ref 11.5–15.5)
HYPOCHROMIA: ABNORMAL
IRON SATURATION: 13 % (ref 15–50)
IRON: 39 MCG/DL (ref 37–145)
LYMPHOCYTES ABSOLUTE: 0.51 E9/L (ref 1.5–4)
LYMPHOCYTES RELATIVE PERCENT: 7 % (ref 20–42)
MCH RBC QN AUTO: 23.8 PG (ref 26–35)
MCHC RBC AUTO-ENTMCNC: 28.9 % (ref 32–34.5)
MCV RBC AUTO: 82.2 FL (ref 80–99.9)
METER GLUCOSE: 136 MG/DL (ref 74–99)
METER GLUCOSE: 152 MG/DL (ref 74–99)
METER GLUCOSE: 181 MG/DL (ref 74–99)
METER GLUCOSE: 191 MG/DL (ref 74–99)
MONOCYTES ABSOLUTE: 0.07 E9/L (ref 0.1–0.95)
MONOCYTES RELATIVE PERCENT: 1 % (ref 2–12)
MYELOCYTE PERCENT: 1 % (ref 0–0)
NEUTROPHILS ABSOLUTE: 6.72 E9/L (ref 1.8–7.3)
NEUTROPHILS RELATIVE PERCENT: 91 % (ref 43–80)
PDW BLD-RTO: 15.5 FL (ref 11.5–15)
PLATELET # BLD: 165 E9/L (ref 130–450)
PMV BLD AUTO: ABNORMAL FL (ref 7–12)
POIKILOCYTES: ABNORMAL
POLYCHROMASIA: ABNORMAL
POTASSIUM SERPL-SCNC: 4.1 MMOL/L (ref 3.5–5)
RBC # BLD: 4.04 E12/L (ref 3.5–5.5)
SODIUM BLD-SCNC: 142 MMOL/L (ref 132–146)
TARGET CELLS: ABNORMAL
TOTAL IRON BINDING CAPACITY: 292 MCG/DL (ref 250–450)
TOTAL PROTEIN: 6.5 G/DL (ref 6.4–8.3)
WBC # BLD: 7.3 E9/L (ref 4.5–11.5)

## 2023-01-01 PROCEDURE — 94640 AIRWAY INHALATION TREATMENT: CPT

## 2023-01-01 PROCEDURE — 82962 GLUCOSE BLOOD TEST: CPT

## 2023-01-01 PROCEDURE — 6370000000 HC RX 637 (ALT 250 FOR IP): Performed by: INTERNAL MEDICINE

## 2023-01-01 PROCEDURE — 80053 COMPREHEN METABOLIC PANEL: CPT

## 2023-01-01 PROCEDURE — 6360000002 HC RX W HCPCS: Performed by: INTERNAL MEDICINE

## 2023-01-01 PROCEDURE — 85025 COMPLETE CBC W/AUTO DIFF WBC: CPT

## 2023-01-01 PROCEDURE — 2580000003 HC RX 258: Performed by: INTERNAL MEDICINE

## 2023-01-01 PROCEDURE — 1200000000 HC SEMI PRIVATE

## 2023-01-01 PROCEDURE — 83540 ASSAY OF IRON: CPT

## 2023-01-01 PROCEDURE — 83550 IRON BINDING TEST: CPT

## 2023-01-01 PROCEDURE — 36415 COLL VENOUS BLD VENIPUNCTURE: CPT

## 2023-01-01 PROCEDURE — 2700000000 HC OXYGEN THERAPY PER DAY

## 2023-01-01 RX ORDER — IPRATROPIUM BROMIDE AND ALBUTEROL SULFATE 2.5; .5 MG/3ML; MG/3ML
1 SOLUTION RESPIRATORY (INHALATION) 4 TIMES DAILY
Status: DISCONTINUED | OUTPATIENT
Start: 2023-01-01 | End: 2023-01-06

## 2023-01-01 RX ORDER — IPRATROPIUM BROMIDE AND ALBUTEROL SULFATE 2.5; .5 MG/3ML; MG/3ML
1 SOLUTION RESPIRATORY (INHALATION) EVERY 4 HOURS PRN
Status: DISCONTINUED | OUTPATIENT
Start: 2023-01-01 | End: 2023-01-06

## 2023-01-01 RX ORDER — ARFORMOTEROL TARTRATE 15 UG/2ML
15 SOLUTION RESPIRATORY (INHALATION) 2 TIMES DAILY
Status: DISCONTINUED | OUTPATIENT
Start: 2023-01-01 | End: 2023-01-06

## 2023-01-01 RX ADMIN — METOPROLOL TARTRATE 25 MG: 25 TABLET, FILM COATED ORAL at 08:52

## 2023-01-01 RX ADMIN — BUDESONIDE 500 MCG: 0.5 SUSPENSION RESPIRATORY (INHALATION) at 18:26

## 2023-01-01 RX ADMIN — METOPROLOL TARTRATE 25 MG: 25 TABLET, FILM COATED ORAL at 21:59

## 2023-01-01 RX ADMIN — HYDROCHLOROTHIAZIDE 12.5 MG: 12.5 TABLET ORAL at 08:52

## 2023-01-01 RX ADMIN — PANTOPRAZOLE SODIUM 40 MG: 40 TABLET, DELAYED RELEASE ORAL at 08:52

## 2023-01-01 RX ADMIN — ENOXAPARIN SODIUM 30 MG: 100 INJECTION SUBCUTANEOUS at 08:53

## 2023-01-01 RX ADMIN — IPRATROPIUM BROMIDE AND ALBUTEROL SULFATE 1 AMPULE: .5; 2.5 SOLUTION RESPIRATORY (INHALATION) at 18:26

## 2023-01-01 RX ADMIN — OXYCODONE AND ACETAMINOPHEN 1 TABLET: 5; 325 TABLET ORAL at 14:28

## 2023-01-01 RX ADMIN — PANTOPRAZOLE SODIUM 40 MG: 40 TABLET, DELAYED RELEASE ORAL at 21:59

## 2023-01-01 RX ADMIN — LEVOTHYROXINE SODIUM 50 MCG: 0.05 TABLET ORAL at 05:04

## 2023-01-01 RX ADMIN — MONTELUKAST 10 MG: 10 TABLET, FILM COATED ORAL at 21:59

## 2023-01-01 RX ADMIN — IPRATROPIUM BROMIDE AND ALBUTEROL SULFATE 1 AMPULE: .5; 2.5 SOLUTION RESPIRATORY (INHALATION) at 14:21

## 2023-01-01 RX ADMIN — METHYLPREDNISOLONE SODIUM SUCCINATE 40 MG: 40 INJECTION, POWDER, FOR SOLUTION INTRAMUSCULAR; INTRAVENOUS at 06:12

## 2023-01-01 RX ADMIN — DICLOFENAC SODIUM 2 G: 10 GEL TOPICAL at 22:04

## 2023-01-01 RX ADMIN — OXYCODONE AND ACETAMINOPHEN 1 TABLET: 5; 325 TABLET ORAL at 04:59

## 2023-01-01 RX ADMIN — BUDESONIDE 500 MCG: 0.5 SUSPENSION RESPIRATORY (INHALATION) at 06:06

## 2023-01-01 RX ADMIN — ENOXAPARIN SODIUM 30 MG: 100 INJECTION SUBCUTANEOUS at 22:03

## 2023-01-01 RX ADMIN — ALLOPURINOL 200 MG: 100 TABLET ORAL at 08:52

## 2023-01-01 RX ADMIN — IPRATROPIUM BROMIDE AND ALBUTEROL SULFATE 1 AMPULE: .5; 2.5 SOLUTION RESPIRATORY (INHALATION) at 01:41

## 2023-01-01 RX ADMIN — ARFORMOTEROL TARTRATE 15 MCG: 15 SOLUTION RESPIRATORY (INHALATION) at 18:26

## 2023-01-01 RX ADMIN — IPRATROPIUM BROMIDE AND ALBUTEROL SULFATE 1 AMPULE: .5; 2.5 SOLUTION RESPIRATORY (INHALATION) at 10:11

## 2023-01-01 RX ADMIN — Medication 10 ML: at 08:53

## 2023-01-01 RX ADMIN — IPRATROPIUM BROMIDE AND ALBUTEROL SULFATE 1 AMPULE: .5; 2.5 SOLUTION RESPIRATORY (INHALATION) at 06:06

## 2023-01-01 RX ADMIN — Medication 10 ML: at 22:07

## 2023-01-01 RX ADMIN — DICLOFENAC SODIUM 2 G: 10 GEL TOPICAL at 08:53

## 2023-01-01 ASSESSMENT — PAIN SCALES - GENERAL: PAINLEVEL_OUTOF10: 9

## 2023-01-01 ASSESSMENT — PAIN DESCRIPTION - LOCATION: LOCATION: BACK

## 2023-01-01 ASSESSMENT — PAIN DESCRIPTION - ORIENTATION: ORIENTATION: LOWER

## 2023-01-01 ASSESSMENT — PAIN - FUNCTIONAL ASSESSMENT: PAIN_FUNCTIONAL_ASSESSMENT: ACTIVITIES ARE NOT PREVENTED

## 2023-01-01 ASSESSMENT — PAIN DESCRIPTION - DESCRIPTORS: DESCRIPTORS: ACHING;DISCOMFORT;DULL

## 2023-01-01 NOTE — PROGRESS NOTES
Patient's IV site to right hand became dislodged. This nurse & supervisor attempted to place another IV without success. Supervisor was going to ask a nurse from ED to place new IV access.

## 2023-01-01 NOTE — PROGRESS NOTES
Department of Internal Medicine      PCP: Tod Yoder DO  Admitting Physician: Dr. Sj Calero  Consultants:   Date of Service: 12/29/2022    CHIEF COMPLAINT:  sob    HISTORY OF PRESENT ILLNESS:    Patient is 26-year-old female who presented to the ED due to shortness of breath. patient admits to associated cough but denies any sputum production. She does admit to subjective fever and chills. She does have history of sleep apnea but does not use CPAP.    12/30/2022  Patient seen examined on monitored bed. Patient states he feels little bit better today. Patient denies any chest or abdominal pain. Patient still has shortness of breath with activity. BUN/creatinine 12/0.7 with normal liver enzymes. WBC 5.9 hemoglobin 9.9. Temperature is 97.8 with heart rate 82 blood pressure 153/93. O2 sat high percent on 2 L.    12/31/2022  Patient seen examined on monitored bed. Patient still feels short of breath with activity. Patient overall feels better. BUN/creatinine was 18/0.8 with normal electrolytes. Liver enzymes normal with WBC 8.1 hemoglobin 9.7. Temperature is 98.2 with heart rate 89 blood pressure 150/70. O2 sat 98% on 2 L nasal cannula. Lung auscultation does not show any expiratory wheezing now but just has some decreased coarse breath sounds. 1/1/2023  Patient seen examined on medical surgical floor. Patient states she had shortness of breath early in a.m. about 2 hours after she had her aerosol treatment. Patient states when she gets aerosol treatment makes her feel much better. BUN/creatinine 27/0.9 normal electrolytes. Blood sugars range 164-204. Liver enzymes normal with WBC 7.3 and hemoglobin 9.6. Temperature 98.5 heart rate 75 blood pressure 152/72. O2 sat 98% on 2 L nasal cannula and is 91% on room air at rest.  Because of the persistent shortness of breath which is relieved with aerosols we will add Brovana aerosol twice daily.     PAST MEDICAL Hx:  Past Medical History: Diagnosis Date    Arthritis     Asthma     DM (diabetes mellitus) ([facility]     HTN (hypertension)     Morbid obesity due to excess calories ([facility]     Obstructive sleep apnea syndrome     Pulmonary hypertension ([facility]     Retention of urine        PAST SURGICAL Hx:   Past Surgical History:   Procedure Laterality Date    APPENDECTOMY  1985    CATARACT REMOVAL Bilateral 2017    [address] (CERVIX STATUS UNKNOWN)  2017    JOINT REPLACEMENT Left 2009    knee    NERVE BLOCK N/A 12/03/2014    cervical #1    NERVE BLOCK N/A 12/10/2014    cervical epidural #2    NERVE BLOCK  12/17/2014    cerical epidural #3    UPPER GASTROINTESTINAL ENDOSCOPY N/A 10/17/2022    EGD BIOPSY performed by Noemi Singh MD at [address] Hx:  Family History   Problem Relation Age of Onset    Cancer Father     Diabetes Mother     Hypertension Mother        HOME MEDICATIONS:  Prior to Admission medications    Medication Sig Start Date End Date Taking? Authorizing Provider   lidocaine (LIDODERM) 5 % Place 1 patch onto the skin daily 12 hours on, 12 hours off. Historical Provider, MD   pantoprazole (PROTONIX) 40 MG tablet Take 40 mg by mouth 2 times daily    Historical Provider, MD   levothyroxine (SYNTHROID) 25 MCG tablet Take 50 mcg by mouth Daily    Historical Provider, MD   fluticasone 200 MCG/ACT AEPB Inhale 1 puff into the lungs daily    Historical Provider, MD   albuterol sulfate  (90 Base) MCG/ACT inhaler Inhale 2 puffs into the lungs as needed for Wheezing    Historical Provider, MD   metFORMIN (GLUCOPHAGE) 500 MG tablet Take 500 mg by mouth 2 times daily (with meals)    Historical Provider, MD   allopurinol (ZYLOPRIM) 100 MG tablet Take 200 mg by mouth daily    Historical Provider, MD   hydrochlorothiazide (MICROZIDE) 12.5 MG capsule Take 12.5 mg by mouth daily.     Historical Provider, MD   oxyCODONE-acetaminophen (PERCOCET) 5-325 MG per tablet Take 1 tablet by mouth every 6 hours as needed for Pain. Historical Provider, MD   montelukast (SINGULAIR) 10 MG tablet Take 10 mg by mouth nightly. Historical Provider, MD   metoprolol (LOPRESSOR) 25 MG tablet Take 25 mg by mouth 2 times daily. Historical Provider, MD   cyclobenzaprine (FLEXERIL) 10 MG tablet Take 10 mg by mouth 3 times daily as needed for Muscle spasms    Historical Provider, MD   diclofenac sodium (VOLTAREN) 1 % GEL Apply 2 g topically 2 times daily. Historical Provider, MD       ALLERGIES:  Katrin Danielle [aspirin], Ibuprofen, Norco [hydrocodone-acetaminophen], and Ultram [tramadol]    SOCIAL Hx:  Social History     Socioeconomic History    Marital status: Single     Spouse name: Not on file    Number of children: Not on file    Years of education: Not on file    Highest education level: Not on file   Occupational History    Not on file   Tobacco Use    Smoking status: Never    Smokeless tobacco: Never   Vaping Use    Vaping Use: Unknown   Substance and Sexual Activity    Alcohol use: No    Drug use: No    Sexual activity: Not on file   Other Topics Concern    Not on file   Social History Narrative    Not on file     Social Determinants of Health     Financial Resource Strain: Not on file   Food Insecurity: Not on file   Transportation Needs: Not on file   Physical Activity: Not on file   Stress: Not on file   Social Connections: Not on file   Intimate Partner Violence: Not on file   Housing Stability: Not on file       ROS: Positive in bold  General:   Denies chills, fatigue, fever, malaise, night sweats or weight loss    Psychological:   Denies anxiety, disorientation or hallucinations    ENT:    Denies epistaxis, headaches, vertigo or visual changes    Cardiovascular:   Denies any chest pain, irregular heartbeats, or palpitations. No paroxysmal nocturnal dyspnea. Respiratory:   Denies shortness of breath, coughing, sputum production, hemoptysis, or wheezing. No orthopnea.     Gastrointestinal:   Denies nausea, vomiting, diarrhea, or constipation. Denies any abdominal pain. Denies change in bowel habits or stools. Genito-Urinary:    Denies any urgency, frequency, hematuria. Voiding without difficulty. Musculoskeletal:   Denies joint pain, joint stiffness, joint swelling or muscle pain    Neurology:    Denies any headache or focal neurological deficits. No weakness or paresthesia. Derm:    Denies any rashes, ulcers, or excoriations. Denies bruising. Extremities:   Denies any lower extremity swelling or edema. PHYSICAL EXAM: Abnormal findings noted  VITALS:  Vitals:    01/01/23 0852   BP: (!) 152/72   Pulse: 75   Resp:    Temp:    SpO2:          CONSTITUTIONAL:    Awake, alert, cooperative, no apparent distress, and appears stated age    EYES:     EOMI, sclera clear, conjunctiva normal    ENT:    Normocephalic, atraumatic,  External ears without lesions. NECK:    Supple, symmetrical, trachea midline, no JVD    HEMATOLOGIC/LYMPHATICS:    No cervical lymphadenopathy and no supraclavicular lymphadenopathy    LUNGS:    Symmetric. No increased work of breathing, good air exchange, clear to auscultation bilaterally, no wheezes, rhonchi, or rales,   Patient has diminished breath sounds bilaterally    CARDIOVASCULAR:    Normal apical impulse, regular rate and rhythm, normal S1 and S2, no S3 or S4, and no murmur noted    ABDOMEN:    soft, non-distended, non-tender    MUSCULOSKELETAL:    There is no redness, warmth, or swelling of the joints. NEUROLOGIC:    Awake, alert, oriented to name, place and time. SKIN:    No bruising or bleeding. No redness, warmth, or swelling    EXTREMITIES:    Peripheral pulses present. No edema, cyanosis, or swelling.     LINES/CATHETERS     LABORATORY DATA:  CBC with Differential:    Lab Results   Component Value Date/Time    WBC 7.3 01/01/2023 04:44 AM    RBC 4.04 01/01/2023 04:44 AM    HGB 9.6 01/01/2023 04:44 AM    HCT 33.2 01/01/2023 04:44 AM     01/01/2023 04:44 AM    MCV 82.2 01/01/2023 04:44 AM    MCH 23.8 01/01/2023 04:44 AM    MCHC 28.9 01/01/2023 04:44 AM    RDW 15.5 01/01/2023 04:44 AM    LYMPHOPCT 7.0 01/01/2023 04:44 AM    MONOPCT 1.0 01/01/2023 04:44 AM    MYELOPCT 1.0 01/01/2023 04:44 AM    BASOPCT 0.0 01/01/2023 04:44 AM    MONOSABS 0.07 01/01/2023 04:44 AM    LYMPHSABS 0.51 01/01/2023 04:44 AM    EOSABS 0.00 01/01/2023 04:44 AM    BASOSABS 0.00 01/01/2023 04:44 AM     CMP:    Lab Results   Component Value Date/Time     01/01/2023 04:44 AM    K 4.1 01/01/2023 04:44 AM     01/01/2023 04:44 AM    CO2 28 01/01/2023 04:44 AM    BUN 27 01/01/2023 04:44 AM    CREATININE 0.9 01/01/2023 04:44 AM    GFRAA >60 10/17/2022 11:38 AM    LABGLOM >60 01/01/2023 04:44 AM    GLUCOSE 204 01/01/2023 04:44 AM    PROT 6.5 01/01/2023 04:44 AM    LABALBU 3.8 01/01/2023 04:44 AM    CALCIUM 8.5 01/01/2023 04:44 AM    BILITOT 0.2 01/01/2023 04:44 AM    ALKPHOS 89 01/01/2023 04:44 AM    AST 13 01/01/2023 04:44 AM    ALT 13 01/01/2023 04:44 AM       ASSESSMENT/PLAN:  Acute COPD exacerbation   Acute on chronic chronic normocytic anemia  Asthma  sleep apnea without use of CPAP  Mild pulmonary hypertension  Mild tricuspid regurgitation  Non-insulin-dependent diabetes mellitus  Hypertension    Patient presented with shortness of breath. Patient found to be in acute COPD exacerbation. Patient placed on nebulized breathing treatments and IV steroids. Viral respiratory panel ordered. Continue to monitor pulse ox.     Home medication reviewed  O2 saturation on room air at rest and if greater than 89% with activity and monitor heart rate    Proventil aerosol twice daily    Mraio Arroyo, DO  1:38 PM  1/1/2023

## 2023-01-02 LAB
ALBUMIN SERPL-MCNC: 3.9 G/DL (ref 3.5–5.2)
ALP BLD-CCNC: 88 U/L (ref 35–104)
ALT SERPL-CCNC: 17 U/L (ref 0–32)
ANION GAP SERPL CALCULATED.3IONS-SCNC: 10 MMOL/L (ref 7–16)
ANISOCYTOSIS: ABNORMAL
AST SERPL-CCNC: 18 U/L (ref 0–31)
BASOPHILS ABSOLUTE: 0 E9/L (ref 0–0.2)
BASOPHILS RELATIVE PERCENT: 0 % (ref 0–2)
BILIRUB SERPL-MCNC: 0.3 MG/DL (ref 0–1.2)
BUN BLDV-MCNC: 30 MG/DL (ref 6–23)
CALCIUM SERPL-MCNC: 8.4 MG/DL (ref 8.6–10.2)
CHLORIDE BLD-SCNC: 101 MMOL/L (ref 98–107)
CO2: 29 MMOL/L (ref 22–29)
CREAT SERPL-MCNC: 0.9 MG/DL (ref 0.5–1)
EOSINOPHILS ABSOLUTE: 0 E9/L (ref 0.05–0.5)
EOSINOPHILS RELATIVE PERCENT: 0 % (ref 0–6)
GFR SERPL CREATININE-BSD FRML MDRD: >60 ML/MIN/1.73
GLUCOSE BLD-MCNC: 162 MG/DL (ref 74–99)
HCT VFR BLD CALC: 35.6 % (ref 34–48)
HEMOGLOBIN: 10.2 G/DL (ref 11.5–15.5)
HYPOCHROMIA: ABNORMAL
LYMPHOCYTES ABSOLUTE: 0.68 E9/L (ref 1.5–4)
LYMPHOCYTES RELATIVE PERCENT: 8.9 % (ref 20–42)
MCH RBC QN AUTO: 23.4 PG (ref 26–35)
MCHC RBC AUTO-ENTMCNC: 28.7 % (ref 32–34.5)
MCV RBC AUTO: 81.8 FL (ref 80–99.9)
METER GLUCOSE: 127 MG/DL (ref 74–99)
METER GLUCOSE: 182 MG/DL (ref 74–99)
METER GLUCOSE: 253 MG/DL (ref 74–99)
METER GLUCOSE: 259 MG/DL (ref 74–99)
MONOCYTES ABSOLUTE: 0.52 E9/L (ref 0.1–0.95)
MONOCYTES RELATIVE PERCENT: 7.1 % (ref 2–12)
MYELOCYTE PERCENT: 0.9 % (ref 0–0)
NEUTROPHILS ABSOLUTE: 6.3 E9/L (ref 1.8–7.3)
NEUTROPHILS RELATIVE PERCENT: 83 % (ref 43–80)
OVALOCYTES: ABNORMAL
PDW BLD-RTO: 15 FL (ref 11.5–15)
PLATELET # BLD: 160 E9/L (ref 130–450)
PMV BLD AUTO: ABNORMAL FL (ref 7–12)
POIKILOCYTES: ABNORMAL
POLYCHROMASIA: ABNORMAL
POTASSIUM SERPL-SCNC: 4.4 MMOL/L (ref 3.5–5)
RBC # BLD: 4.35 E12/L (ref 3.5–5.5)
SODIUM BLD-SCNC: 140 MMOL/L (ref 132–146)
TARGET CELLS: ABNORMAL
TEAR DROP CELLS: ABNORMAL
TOTAL PROTEIN: 6.8 G/DL (ref 6.4–8.3)
WBC # BLD: 7.5 E9/L (ref 4.5–11.5)

## 2023-01-02 PROCEDURE — 6370000000 HC RX 637 (ALT 250 FOR IP): Performed by: INTERNAL MEDICINE

## 2023-01-02 PROCEDURE — 94640 AIRWAY INHALATION TREATMENT: CPT

## 2023-01-02 PROCEDURE — 6360000002 HC RX W HCPCS: Performed by: INTERNAL MEDICINE

## 2023-01-02 PROCEDURE — 85025 COMPLETE CBC W/AUTO DIFF WBC: CPT

## 2023-01-02 PROCEDURE — 1200000000 HC SEMI PRIVATE

## 2023-01-02 PROCEDURE — 2580000003 HC RX 258: Performed by: INTERNAL MEDICINE

## 2023-01-02 PROCEDURE — 2700000000 HC OXYGEN THERAPY PER DAY

## 2023-01-02 PROCEDURE — 82962 GLUCOSE BLOOD TEST: CPT

## 2023-01-02 PROCEDURE — 80053 COMPREHEN METABOLIC PANEL: CPT

## 2023-01-02 PROCEDURE — 36415 COLL VENOUS BLD VENIPUNCTURE: CPT

## 2023-01-02 RX ADMIN — IPRATROPIUM BROMIDE AND ALBUTEROL SULFATE 1 AMPULE: .5; 2.5 SOLUTION RESPIRATORY (INHALATION) at 09:23

## 2023-01-02 RX ADMIN — ALLOPURINOL 200 MG: 100 TABLET ORAL at 08:13

## 2023-01-02 RX ADMIN — METOPROLOL TARTRATE 25 MG: 25 TABLET, FILM COATED ORAL at 21:00

## 2023-01-02 RX ADMIN — OXYCODONE AND ACETAMINOPHEN 1 TABLET: 5; 325 TABLET ORAL at 13:24

## 2023-01-02 RX ADMIN — ENOXAPARIN SODIUM 30 MG: 100 INJECTION SUBCUTANEOUS at 08:17

## 2023-01-02 RX ADMIN — HYDROCHLOROTHIAZIDE 12.5 MG: 12.5 TABLET ORAL at 08:13

## 2023-01-02 RX ADMIN — IPRATROPIUM BROMIDE AND ALBUTEROL SULFATE 1 AMPULE: .5; 2.5 SOLUTION RESPIRATORY (INHALATION) at 19:20

## 2023-01-02 RX ADMIN — METHYLPREDNISOLONE SODIUM SUCCINATE 40 MG: 40 INJECTION, POWDER, FOR SOLUTION INTRAMUSCULAR; INTRAVENOUS at 16:23

## 2023-01-02 RX ADMIN — BUDESONIDE 500 MCG: 0.5 SUSPENSION RESPIRATORY (INHALATION) at 19:20

## 2023-01-02 RX ADMIN — IPRATROPIUM BROMIDE AND ALBUTEROL SULFATE 1 AMPULE: .5; 2.5 SOLUTION RESPIRATORY (INHALATION) at 13:08

## 2023-01-02 RX ADMIN — ARFORMOTEROL TARTRATE 15 MCG: 15 SOLUTION RESPIRATORY (INHALATION) at 04:46

## 2023-01-02 RX ADMIN — METHYLPREDNISOLONE SODIUM SUCCINATE 40 MG: 40 INJECTION, POWDER, FOR SOLUTION INTRAMUSCULAR; INTRAVENOUS at 00:24

## 2023-01-02 RX ADMIN — Medication 10 ML: at 16:23

## 2023-01-02 RX ADMIN — IPRATROPIUM BROMIDE AND ALBUTEROL SULFATE 1 AMPULE: .5; 2.5 SOLUTION RESPIRATORY (INHALATION) at 04:46

## 2023-01-02 RX ADMIN — CYCLOBENZAPRINE HYDROCHLORIDE 10 MG: 5 TABLET, FILM COATED ORAL at 02:31

## 2023-01-02 RX ADMIN — PANTOPRAZOLE SODIUM 40 MG: 40 TABLET, DELAYED RELEASE ORAL at 08:12

## 2023-01-02 RX ADMIN — OXYCODONE AND ACETAMINOPHEN 1 TABLET: 5; 325 TABLET ORAL at 06:28

## 2023-01-02 RX ADMIN — METOPROLOL TARTRATE 25 MG: 25 TABLET, FILM COATED ORAL at 08:12

## 2023-01-02 RX ADMIN — LEVOTHYROXINE SODIUM 50 MCG: 0.05 TABLET ORAL at 06:21

## 2023-01-02 RX ADMIN — Medication 10 ML: at 21:02

## 2023-01-02 RX ADMIN — BUDESONIDE 500 MCG: 0.5 SUSPENSION RESPIRATORY (INHALATION) at 04:46

## 2023-01-02 RX ADMIN — MONTELUKAST 10 MG: 10 TABLET, FILM COATED ORAL at 20:59

## 2023-01-02 RX ADMIN — OXYCODONE AND ACETAMINOPHEN 1 TABLET: 5; 325 TABLET ORAL at 00:24

## 2023-01-02 RX ADMIN — Medication 10 ML: at 08:22

## 2023-01-02 RX ADMIN — METHYLPREDNISOLONE SODIUM SUCCINATE 40 MG: 40 INJECTION, POWDER, FOR SOLUTION INTRAMUSCULAR; INTRAVENOUS at 08:18

## 2023-01-02 RX ADMIN — DICLOFENAC SODIUM 2 G: 10 GEL TOPICAL at 21:02

## 2023-01-02 RX ADMIN — PANTOPRAZOLE SODIUM 40 MG: 40 TABLET, DELAYED RELEASE ORAL at 20:59

## 2023-01-02 RX ADMIN — ARFORMOTEROL TARTRATE 15 MCG: 15 SOLUTION RESPIRATORY (INHALATION) at 19:20

## 2023-01-02 RX ADMIN — INSULIN LISPRO 4 UNITS: 100 INJECTION, SOLUTION INTRAVENOUS; SUBCUTANEOUS at 11:27

## 2023-01-02 RX ADMIN — ENOXAPARIN SODIUM 30 MG: 100 INJECTION SUBCUTANEOUS at 20:58

## 2023-01-02 ASSESSMENT — PAIN SCALES - GENERAL
PAINLEVEL_OUTOF10: 7
PAINLEVEL_OUTOF10: 5
PAINLEVEL_OUTOF10: 4
PAINLEVEL_OUTOF10: 7
PAINLEVEL_OUTOF10: 7

## 2023-01-02 ASSESSMENT — PAIN DESCRIPTION - LOCATION
LOCATION: BACK
LOCATION: BACK

## 2023-01-02 ASSESSMENT — PAIN DESCRIPTION - PAIN TYPE
TYPE: CHRONIC PAIN
TYPE: CHRONIC PAIN

## 2023-01-02 NOTE — PROGRESS NOTES
At rest pt was 92% on room air,ambulated in hallway-pulse ox dropped down to 84% on room air. Put back on oxygen 2L,recovered to 98% after 2 minutes.

## 2023-01-02 NOTE — PROGRESS NOTES
Department of Internal Medicine      PCP: Deven Gil DO  Admitting Physician: Dr. Tracy Hu  Consultants:   Date of Service: 12/29/2022    CHIEF COMPLAINT:  sob    HISTORY OF PRESENT ILLNESS:    Patient is 53-year-old female who presented to the ED due to shortness of breath. patient admits to associated cough but denies any sputum production. She does admit to subjective fever and chills. She does have history of sleep apnea but does not use CPAP.    12/30/2022  Patient seen examined on monitored bed. Patient states he feels little bit better today. Patient denies any chest or abdominal pain. Patient still has shortness of breath with activity. BUN/creatinine 12/0.7 with normal liver enzymes. WBC 5.9 hemoglobin 9.9. Temperature is 97.8 with heart rate 82 blood pressure 153/93. O2 sat high percent on 2 L.    12/31/2022  Patient seen examined on monitored bed. Patient still feels short of breath with activity. Patient overall feels better. BUN/creatinine was 18/0.8 with normal electrolytes. Liver enzymes normal with WBC 8.1 hemoglobin 9.7. Temperature is 98.2 with heart rate 89 blood pressure 150/70. O2 sat 98% on 2 L nasal cannula. Lung auscultation does not show any expiratory wheezing now but just has some decreased coarse breath sounds. 1/1/2023  Patient seen examined on medical surgical floor. Patient states she had shortness of breath early in a.m. about 2 hours after she had her aerosol treatment. Patient states when she gets aerosol treatment makes her feel much better. BUN/creatinine 27/0.9 normal electrolytes. Blood sugars range 164-204. Liver enzymes normal with WBC 7.3 and hemoglobin 9.6. Temperature 98.5 heart rate 75 blood pressure 152/72. O2 sat 98% on 2 L nasal cannula and is 91% on room air at rest.  Because of the persistent shortness of breath which is relieved with aerosols we will add Brovana aerosol twice daily.     1/2/2023  Patient seen examined on medical surgical floor. Patient states she is feeling better today. Currently patient is on room air and ambulating to the bathroom with minimal difficulty. BUN/creatinine 30/1.9 with normal electrolytes. Blood sugars range 136-182. Liver enzymes normal with a WBC 7.5 and hemoglobin 10.2. Platelet count was 510. Temperature is 97.9 with heart rate of 74 blood pressure 155/85. O2 sat 98% on 2 L nasal cannula. Patient is to have O2 saturations on room air with activity today. PAST MEDICAL Hx:  Past Medical History:   Diagnosis Date    Arthritis     Asthma     DM (diabetes mellitus) ([address]     HTN (hypertension)     Morbid obesity due to excess calories ([address]     Obstructive sleep apnea syndrome     Pulmonary hypertension ([address]     Retention of urine        PAST SURGICAL Hx:   Past Surgical History:   Procedure Laterality Date    APPENDECTOMY  1985    CATARACT REMOVAL Bilateral 2017    [address] (CERVIX STATUS UNKNOWN)  2017    JOINT REPLACEMENT Left 2009    knee    NERVE BLOCK N/A 12/03/2014    cervical #1    NERVE BLOCK N/A 12/10/2014    cervical epidural #2    NERVE BLOCK  12/17/2014    cerical epidural #3    UPPER GASTROINTESTINAL ENDOSCOPY N/A 10/17/2022    EGD BIOPSY performed by Anshul Ann MD at [address] Hx:  Family History   Problem Relation Age of Onset    Cancer Father     Diabetes Mother     Hypertension Mother        HOME MEDICATIONS:  Prior to Admission medications    Medication Sig Start Date End Date Taking? Authorizing Provider   lidocaine (LIDODERM) 5 % Place 1 patch onto the skin daily 12 hours on, 12 hours off.     Historical Provider, MD   pantoprazole (PROTONIX) 40 MG tablet Take 40 mg by mouth 2 times daily    Historical Provider, MD   levothyroxine (SYNTHROID) 25 MCG tablet Take 50 mcg by mouth Daily    Historical Provider, MD   fluticasone 200 MCG/ACT AEPB Inhale 1 puff into the lungs daily    Historical Provider, MD albuterol sulfate  (90 Base) MCG/ACT inhaler Inhale 2 puffs into the lungs as needed for Wheezing    Historical Provider, MD   metFORMIN (GLUCOPHAGE) 500 MG tablet Take 500 mg by mouth 2 times daily (with meals)    Historical Provider, MD   allopurinol (ZYLOPRIM) 100 MG tablet Take 200 mg by mouth daily    Historical Provider, MD   hydrochlorothiazide (MICROZIDE) 12.5 MG capsule Take 12.5 mg by mouth daily. Historical Provider, MD   oxyCODONE-acetaminophen (PERCOCET) 5-325 MG per tablet Take 1 tablet by mouth every 6 hours as needed for Pain. Historical Provider, MD   montelukast (SINGULAIR) 10 MG tablet Take 10 mg by mouth nightly. Historical Provider, MD   metoprolol (LOPRESSOR) 25 MG tablet Take 25 mg by mouth 2 times daily. Historical Provider, MD   cyclobenzaprine (FLEXERIL) 10 MG tablet Take 10 mg by mouth 3 times daily as needed for Muscle spasms    Historical Provider, MD   diclofenac sodium (VOLTAREN) 1 % GEL Apply 2 g topically 2 times daily.     Historical Provider, MD       ALLERGIES:  [place] [aspirin], Ibuprofen, Norco [hydrocodone-acetaminophen], and Ultram [tramadol]    SOCIAL Hx:  Social History     Socioeconomic History    Marital status: Single     Spouse name: Not on file    Number of children: Not on file    Years of education: Not on file    Highest education level: Not on file   Occupational History    Not on file   Tobacco Use    Smoking status: Never    Smokeless tobacco: Never   Vaping Use    Vaping Use: Unknown   Substance and Sexual Activity    Alcohol use: No    Drug use: No    Sexual activity: Not on file   Other Topics Concern    Not on file   Social History Narrative    Not on file     Social Determinants of Health     Financial Resource Strain: Not on file   Food Insecurity: Not on file   Transportation Needs: Not on file   Physical Activity: Not on file   Stress: Not on file   Social Connections: Not on file   Intimate Partner Violence: Not on file   Housing Stability: Not on file       ROS: Positive in bold  General:   Denies chills, fatigue, fever, malaise, night sweats or weight loss    Psychological:   Denies anxiety, disorientation or hallucinations    ENT:    Denies epistaxis, headaches, vertigo or visual changes    Cardiovascular:   Denies any chest pain, irregular heartbeats, or palpitations. No paroxysmal nocturnal dyspnea. Respiratory:   Denies shortness of breath, coughing, sputum production, hemoptysis, or wheezing. No orthopnea. Gastrointestinal:   Denies nausea, vomiting, diarrhea, or constipation. Denies any abdominal pain. Denies change in bowel habits or stools. Genito-Urinary:    Denies any urgency, frequency, hematuria. Voiding without difficulty. Musculoskeletal:   Denies joint pain, joint stiffness, joint swelling or muscle pain    Neurology:    Denies any headache or focal neurological deficits. No weakness or paresthesia. Derm:    Denies any rashes, ulcers, or excoriations. Denies bruising. Extremities:   Denies any lower extremity swelling or edema. PHYSICAL EXAM: Abnormal findings noted  VITALS:  Vitals:    01/02/23 0800   BP:    Pulse:    Resp:    Temp:    SpO2: 98%         CONSTITUTIONAL:    Awake, alert, cooperative, no apparent distress, and appears stated age    EYES:     EOMI, sclera clear, conjunctiva normal    ENT:    Normocephalic, atraumatic,  External ears without lesions. NECK:    Supple, symmetrical, trachea midline, no JVD    HEMATOLOGIC/LYMPHATICS:    No cervical lymphadenopathy and no supraclavicular lymphadenopathy    LUNGS:    Symmetric.  No increased work of breathing, good air exchange, clear to auscultation bilaterally, no wheezes, rhonchi, or rales,   Patient has diminished breath sounds bilaterally    CARDIOVASCULAR:    Normal apical impulse, regular rate and rhythm, normal S1 and S2, no S3 or S4, and no murmur noted    ABDOMEN:    soft, non-distended, non-tender    MUSCULOSKELETAL: There is no redness, warmth, or swelling of the joints. NEUROLOGIC:    Awake, alert, oriented to name, place and time. SKIN:    No bruising or bleeding. No redness, warmth, or swelling    EXTREMITIES:    Peripheral pulses present. No edema, cyanosis, or swelling. LINES/CATHETERS     LABORATORY DATA:  CBC with Differential:    Lab Results   Component Value Date/Time    WBC 7.5 01/02/2023 03:36 AM    RBC 4.35 01/02/2023 03:36 AM    HGB 10.2 01/02/2023 03:36 AM    HCT 35.6 01/02/2023 03:36 AM     01/02/2023 03:36 AM    MCV 81.8 01/02/2023 03:36 AM    MCH 23.4 01/02/2023 03:36 AM    MCHC 28.7 01/02/2023 03:36 AM    RDW 15.0 01/02/2023 03:36 AM    LYMPHOPCT 8.9 01/02/2023 03:36 AM    MONOPCT 7.1 01/02/2023 03:36 AM    MYELOPCT 0.9 01/02/2023 03:36 AM    BASOPCT 0.0 01/02/2023 03:36 AM    MONOSABS 0.52 01/02/2023 03:36 AM    LYMPHSABS 0.68 01/02/2023 03:36 AM    EOSABS 0.00 01/02/2023 03:36 AM    BASOSABS 0.00 01/02/2023 03:36 AM     CMP:    Lab Results   Component Value Date/Time     01/02/2023 03:36 AM    K 4.4 01/02/2023 03:36 AM     01/02/2023 03:36 AM    CO2 29 01/02/2023 03:36 AM    BUN 30 01/02/2023 03:36 AM    CREATININE 0.9 01/02/2023 03:36 AM    GFRAA >60 10/17/2022 11:38 AM    LABGLOM >60 01/02/2023 03:36 AM    GLUCOSE 162 01/02/2023 03:36 AM    PROT 6.8 01/02/2023 03:36 AM    LABALBU 3.9 01/02/2023 03:36 AM    CALCIUM 8.4 01/02/2023 03:36 AM    BILITOT 0.3 01/02/2023 03:36 AM    ALKPHOS 88 01/02/2023 03:36 AM    AST 18 01/02/2023 03:36 AM    ALT 17 01/02/2023 03:36 AM       ASSESSMENT/PLAN:  Acute COPD exacerbation   Acute on chronic chronic normocytic anemia  History of asthma  Sleep apnea without use of CPAP  Mild pulmonary hypertension  Mild tricuspid regurgitation  Non-insulin-dependent diabetes mellitus  Hypertension  Morbid obesity    Patient presented with shortness of breath. Patient found to be in acute COPD exacerbation.   Patient placed on nebulized breathing treatments and IV steroids. Viral respiratory panel ordered. Continue to monitor pulse ox.     Home medication reviewed    O2 saturation on room air at rest and if greater than 89% with activity and monitor heart rate    A24, folic acid level    Proventil aerosol twice daily    Beatris Ruiz DO  10:39 AM  1/2/2023

## 2023-01-02 NOTE — PLAN OF CARE
Problem: Pain  Goal: Verbalizes/displays adequate comfort level or baseline comfort level  Outcome: Progressing     Problem: Chronic Conditions and Co-morbidities  Goal: Patient's chronic conditions and co-morbidity symptoms are monitored and maintained or improved  Outcome: Progressing     Problem: Safety - Adult  Goal: Free from fall injury  Outcome: Progressing     Problem: Discharge Planning  Goal: Discharge to home or other facility with appropriate resources  Outcome: Progressing

## 2023-01-03 LAB
ALBUMIN SERPL-MCNC: 3.7 G/DL (ref 3.5–5.2)
ALP BLD-CCNC: 83 U/L (ref 35–104)
ALT SERPL-CCNC: 15 U/L (ref 0–32)
ANION GAP SERPL CALCULATED.3IONS-SCNC: 11 MMOL/L (ref 7–16)
ANISOCYTOSIS: ABNORMAL
AST SERPL-CCNC: 19 U/L (ref 0–31)
BASOPHILS ABSOLUTE: 0 E9/L (ref 0–0.2)
BASOPHILS RELATIVE PERCENT: 0.1 % (ref 0–2)
BILIRUB SERPL-MCNC: 0.3 MG/DL (ref 0–1.2)
BUN BLDV-MCNC: 27 MG/DL (ref 6–23)
CALCIUM SERPL-MCNC: 8.1 MG/DL (ref 8.6–10.2)
CHLORIDE BLD-SCNC: 100 MMOL/L (ref 98–107)
CO2: 29 MMOL/L (ref 22–29)
CREAT SERPL-MCNC: 0.8 MG/DL (ref 0.5–1)
EOSINOPHILS ABSOLUTE: 0 E9/L (ref 0.05–0.5)
EOSINOPHILS RELATIVE PERCENT: 0 % (ref 0–6)
FOLATE: 8.2 NG/ML (ref 4.8–24.2)
GFR SERPL CREATININE-BSD FRML MDRD: >60 ML/MIN/1.73
GLUCOSE BLD-MCNC: 174 MG/DL (ref 74–99)
HBA1C MFR BLD: 6.2 % (ref 4–5.6)
HCT VFR BLD CALC: 33.7 % (ref 34–48)
HEMOGLOBIN: 10.3 G/DL (ref 11.5–15.5)
HYPOCHROMIA: ABNORMAL
LYMPHOCYTES ABSOLUTE: 0.29 E9/L (ref 1.5–4)
LYMPHOCYTES RELATIVE PERCENT: 4.4 % (ref 20–42)
MCH RBC QN AUTO: 24.5 PG (ref 26–35)
MCHC RBC AUTO-ENTMCNC: 30.6 % (ref 32–34.5)
MCV RBC AUTO: 80.2 FL (ref 80–99.9)
METAMYELOCYTES RELATIVE PERCENT: 0.9 % (ref 0–1)
METER GLUCOSE: 161 MG/DL (ref 74–99)
METER GLUCOSE: 185 MG/DL (ref 74–99)
METER GLUCOSE: 236 MG/DL (ref 74–99)
METER GLUCOSE: 250 MG/DL (ref 74–99)
MONOCYTES ABSOLUTE: 0.29 E9/L (ref 0.1–0.95)
MONOCYTES RELATIVE PERCENT: 4.4 % (ref 2–12)
MYELOCYTE PERCENT: 0.9 % (ref 0–0)
NEUTROPHILS ABSOLUTE: 6.55 E9/L (ref 1.8–7.3)
NEUTROPHILS RELATIVE PERCENT: 89.4 % (ref 43–80)
PDW BLD-RTO: 15.2 FL (ref 11.5–15)
PLATELET # BLD: 158 E9/L (ref 130–450)
PMV BLD AUTO: ABNORMAL FL (ref 7–12)
POIKILOCYTES: ABNORMAL
POLYCHROMASIA: ABNORMAL
POTASSIUM SERPL-SCNC: 4.9 MMOL/L (ref 3.5–5)
RBC # BLD: 4.2 E12/L (ref 3.5–5.5)
SODIUM BLD-SCNC: 140 MMOL/L (ref 132–146)
TARGET CELLS: ABNORMAL
TOTAL PROTEIN: 6.7 G/DL (ref 6.4–8.3)
VITAMIN B-12: 522 PG/ML (ref 211–946)
WBC # BLD: 7.2 E9/L (ref 4.5–11.5)

## 2023-01-03 PROCEDURE — 1200000000 HC SEMI PRIVATE

## 2023-01-03 PROCEDURE — 6370000000 HC RX 637 (ALT 250 FOR IP): Performed by: INTERNAL MEDICINE

## 2023-01-03 PROCEDURE — 36415 COLL VENOUS BLD VENIPUNCTURE: CPT

## 2023-01-03 PROCEDURE — 2580000003 HC RX 258: Performed by: INTERNAL MEDICINE

## 2023-01-03 PROCEDURE — 6360000002 HC RX W HCPCS: Performed by: INTERNAL MEDICINE

## 2023-01-03 PROCEDURE — 82746 ASSAY OF FOLIC ACID SERUM: CPT

## 2023-01-03 PROCEDURE — 94640 AIRWAY INHALATION TREATMENT: CPT

## 2023-01-03 PROCEDURE — 2700000000 HC OXYGEN THERAPY PER DAY

## 2023-01-03 PROCEDURE — 83036 HEMOGLOBIN GLYCOSYLATED A1C: CPT

## 2023-01-03 PROCEDURE — 80053 COMPREHEN METABOLIC PANEL: CPT

## 2023-01-03 PROCEDURE — 85025 COMPLETE CBC W/AUTO DIFF WBC: CPT

## 2023-01-03 PROCEDURE — 82607 VITAMIN B-12: CPT

## 2023-01-03 PROCEDURE — 82962 GLUCOSE BLOOD TEST: CPT

## 2023-01-03 RX ORDER — CYCLOBENZAPRINE HCL 5 MG
10 TABLET ORAL 3 TIMES DAILY
Status: DISCONTINUED | OUTPATIENT
Start: 2023-01-03 | End: 2023-01-07 | Stop reason: HOSPADM

## 2023-01-03 RX ADMIN — ARFORMOTEROL TARTRATE 15 MCG: 15 SOLUTION RESPIRATORY (INHALATION) at 05:26

## 2023-01-03 RX ADMIN — IPRATROPIUM BROMIDE AND ALBUTEROL SULFATE 1 AMPULE: .5; 2.5 SOLUTION RESPIRATORY (INHALATION) at 08:56

## 2023-01-03 RX ADMIN — IPRATROPIUM BROMIDE AND ALBUTEROL SULFATE 1 AMPULE: .5; 2.5 SOLUTION RESPIRATORY (INHALATION) at 16:03

## 2023-01-03 RX ADMIN — ENOXAPARIN SODIUM 30 MG: 100 INJECTION SUBCUTANEOUS at 21:09

## 2023-01-03 RX ADMIN — HYDROCHLOROTHIAZIDE 12.5 MG: 12.5 TABLET ORAL at 08:52

## 2023-01-03 RX ADMIN — OXYCODONE AND ACETAMINOPHEN 1 TABLET: 5; 325 TABLET ORAL at 00:08

## 2023-01-03 RX ADMIN — LEVOTHYROXINE SODIUM 50 MCG: 0.05 TABLET ORAL at 06:28

## 2023-01-03 RX ADMIN — METHYLPREDNISOLONE SODIUM SUCCINATE 40 MG: 40 INJECTION, POWDER, FOR SOLUTION INTRAMUSCULAR; INTRAVENOUS at 00:12

## 2023-01-03 RX ADMIN — BUDESONIDE 500 MCG: 0.5 SUSPENSION RESPIRATORY (INHALATION) at 16:03

## 2023-01-03 RX ADMIN — METOPROLOL TARTRATE 25 MG: 25 TABLET, FILM COATED ORAL at 21:14

## 2023-01-03 RX ADMIN — METOPROLOL TARTRATE 25 MG: 25 TABLET, FILM COATED ORAL at 08:52

## 2023-01-03 RX ADMIN — BUDESONIDE 500 MCG: 0.5 SUSPENSION RESPIRATORY (INHALATION) at 05:26

## 2023-01-03 RX ADMIN — CYCLOBENZAPRINE HYDROCHLORIDE 10 MG: 5 TABLET, FILM COATED ORAL at 15:18

## 2023-01-03 RX ADMIN — DICLOFENAC SODIUM 2 G: 10 GEL TOPICAL at 08:52

## 2023-01-03 RX ADMIN — Medication 10 ML: at 21:25

## 2023-01-03 RX ADMIN — MONTELUKAST 10 MG: 10 TABLET, FILM COATED ORAL at 21:10

## 2023-01-03 RX ADMIN — IPRATROPIUM BROMIDE AND ALBUTEROL SULFATE 1 AMPULE: .5; 2.5 SOLUTION RESPIRATORY (INHALATION) at 05:26

## 2023-01-03 RX ADMIN — CYCLOBENZAPRINE HYDROCHLORIDE 10 MG: 5 TABLET, FILM COATED ORAL at 21:10

## 2023-01-03 RX ADMIN — DICLOFENAC SODIUM 2 G: 10 GEL TOPICAL at 21:25

## 2023-01-03 RX ADMIN — OXYCODONE AND ACETAMINOPHEN 1 TABLET: 5; 325 TABLET ORAL at 12:04

## 2023-01-03 RX ADMIN — PANTOPRAZOLE SODIUM 40 MG: 40 TABLET, DELAYED RELEASE ORAL at 21:10

## 2023-01-03 RX ADMIN — METHYLPREDNISOLONE SODIUM SUCCINATE 40 MG: 40 INJECTION, POWDER, FOR SOLUTION INTRAMUSCULAR; INTRAVENOUS at 08:51

## 2023-01-03 RX ADMIN — INSULIN LISPRO 4 UNITS: 100 INJECTION, SOLUTION INTRAVENOUS; SUBCUTANEOUS at 11:55

## 2023-01-03 RX ADMIN — CYCLOBENZAPRINE HYDROCHLORIDE 10 MG: 5 TABLET, FILM COATED ORAL at 00:07

## 2023-01-03 RX ADMIN — OXYCODONE AND ACETAMINOPHEN 1 TABLET: 5; 325 TABLET ORAL at 05:39

## 2023-01-03 RX ADMIN — ENOXAPARIN SODIUM 30 MG: 100 INJECTION SUBCUTANEOUS at 08:51

## 2023-01-03 RX ADMIN — ARFORMOTEROL TARTRATE 15 MCG: 15 SOLUTION RESPIRATORY (INHALATION) at 16:03

## 2023-01-03 RX ADMIN — IPRATROPIUM BROMIDE AND ALBUTEROL SULFATE 1 AMPULE: .5; 2.5 SOLUTION RESPIRATORY (INHALATION) at 12:34

## 2023-01-03 RX ADMIN — PANTOPRAZOLE SODIUM 40 MG: 40 TABLET, DELAYED RELEASE ORAL at 08:52

## 2023-01-03 RX ADMIN — ALLOPURINOL 200 MG: 100 TABLET ORAL at 08:52

## 2023-01-03 RX ADMIN — METHYLPREDNISOLONE SODIUM SUCCINATE 40 MG: 40 INJECTION, POWDER, FOR SOLUTION INTRAMUSCULAR; INTRAVENOUS at 17:42

## 2023-01-03 ASSESSMENT — PAIN DESCRIPTION - DESCRIPTORS
DESCRIPTORS: DISCOMFORT;DULL;ACHING
DESCRIPTORS: ACHING;SHOOTING;DULL
DESCRIPTORS: ACHING;DISCOMFORT;DULL

## 2023-01-03 ASSESSMENT — PAIN SCALES - GENERAL
PAINLEVEL_OUTOF10: 5
PAINLEVEL_OUTOF10: 5
PAINLEVEL_OUTOF10: 8
PAINLEVEL_OUTOF10: 9
PAINLEVEL_OUTOF10: 8

## 2023-01-03 ASSESSMENT — PAIN DESCRIPTION - ORIENTATION
ORIENTATION: MID;LOWER
ORIENTATION: LOWER;MID
ORIENTATION: MID;LOWER

## 2023-01-03 ASSESSMENT — PAIN DESCRIPTION - LOCATION
LOCATION: BACK

## 2023-01-03 NOTE — PLAN OF CARE
Problem: Pain  Goal: Verbalizes/displays adequate comfort level or baseline comfort level  1/3/2023 1115 by Juan Randolph RN  Outcome: Progressing     Problem: Chronic Conditions and Co-morbidities  Goal: Patient's chronic conditions and co-morbidity symptoms are monitored and maintained or improved  1/3/2023 1115 by Juan Randolph RN  Outcome: Progressing     Problem: Safety - Adult  Goal: Free from fall injury  1/3/2023 1115 by Juan Randolph RN  Outcome: Progressing     Problem: Discharge Planning  Goal: Discharge to home or other facility with appropriate resources  1/3/2023 1115 by Juan Randolph RN  Outcome: Progressing     Problem: Metabolic/Fluid and Electrolytes - Adult  Goal: Glucose maintained within prescribed range  Outcome: Progressing

## 2023-01-03 NOTE — PLAN OF CARE
Problem: Pain  Goal: Verbalizes/displays adequate comfort level or baseline comfort level  1/3/2023 0059 by Brianna Tamayo RN  Outcome: Progressing  1/2/2023 1611 by Nona Baxter RN  Outcome: Progressing     Problem: Chronic Conditions and Co-morbidities  Goal: Patient's chronic conditions and co-morbidity symptoms are monitored and maintained or improved  1/3/2023 0059 by Brianna Tamayo RN  Outcome: Progressing  1/2/2023 1611 by Nona Baxter RN  Outcome: Progressing     Problem: Safety - Adult  Goal: Free from fall injury  1/3/2023 0059 by Brianna Tamayo RN  Outcome: Progressing  1/2/2023 1611 by Nona Baxter RN  Outcome: Progressing     Problem: Discharge Planning  Goal: Discharge to home or other facility with appropriate resources  1/3/2023 0059 by Brianna Tamayo RN  Outcome: Progressing  1/2/2023 1611 by Nona Baxter RN  Outcome: Progressing

## 2023-01-03 NOTE — CARE COORDINATION
CM note: attempted to meet with patient earlier today however she was sleeping and did not awaken upon CM entering her room. Pt is still on supplemental oxygen, does not appear to use at home. Will follow for orders. No other discharge needs anticipated.

## 2023-01-03 NOTE — PROGRESS NOTES
Department of Internal Medicine      PCP: Jhoana Leoen DO  Admitting Physician: Dr. Beverly Kramer  Consultants:   Date of Service: 12/29/2022    CHIEF COMPLAINT:  sob    HISTORY OF PRESENT ILLNESS:    Patient is 64-year-old female who presented to the ED due to shortness of breath. patient admits to associated cough but denies any sputum production. She does admit to subjective fever and chills. She does have history of sleep apnea but does not use CPAP.    12/30/2022  Patient seen examined on monitored bed. Patient states he feels little bit better today. Patient denies any chest or abdominal pain. Patient still has shortness of breath with activity. BUN/creatinine 12/0.7 with normal liver enzymes. WBC 5.9 hemoglobin 9.9. Temperature is 97.8 with heart rate 82 blood pressure 153/93. O2 sat high percent on 2 L.    12/31/2022  Patient seen examined on monitored bed. Patient still feels short of breath with activity. Patient overall feels better. BUN/creatinine was 18/0.8 with normal electrolytes. Liver enzymes normal with WBC 8.1 hemoglobin 9.7. Temperature is 98.2 with heart rate 89 blood pressure 150/70. O2 sat 98% on 2 L nasal cannula. Lung auscultation does not show any expiratory wheezing now but just has some decreased coarse breath sounds. 1/1/2023  Patient seen examined on medical surgical floor. Patient states she had shortness of breath early in a.m. about 2 hours after she had her aerosol treatment. Patient states when she gets aerosol treatment makes her feel much better. BUN/creatinine 27/0.9 normal electrolytes. Blood sugars range 164-204. Liver enzymes normal with WBC 7.3 and hemoglobin 9.6. Temperature 98.5 heart rate 75 blood pressure 152/72. O2 sat 98% on 2 L nasal cannula and is 91% on room air at rest.  Because of the persistent shortness of breath which is relieved with aerosols we will add Brovana aerosol twice daily.     1/2/2023  Patient seen examined on medical surgical floor. Patient states she is feeling better today. Currently patient is on room air and ambulating to the bathroom with minimal difficulty. BUN/creatinine 30/1.9 with normal electrolytes. Blood sugars range 136-182. Liver enzymes normal with a WBC 7.5 and hemoglobin 10.2. Platelet count was 413. Temperature is 97.9 with heart rate of 74 blood pressure 155/85. O2 sat 98% on 2 L nasal cannula. Patient is to have O2 saturations on room air with activity today. 1/3/2023  Patient seen examined on medical surgical floor. Patient complaining of some chronic low back pain. Patient overall feels better but still short of breath with activity. She denies any chest or abdominal pain. BUN/creatinine 27/0.8 with normal electrolytes. Blood sugars range 174-253. Liver enzymes normal with WBC 7.2 and hemoglobin 10.3. Temperature is 98.1 with heart rate of 60 and blood pressure 158/87. O2 sat 95% on 2 L nasal cannula. Patient was ambulated in the hallway early yesterday afternoon with O2 sat at rest was 92% but with activity the O2 sat went down to 84%. We will add heating pad to low back along with change in Flexeril 10 mg 3 times daily.     PAST MEDICAL Hx:  Past Medical History:   Diagnosis Date    Arthritis     Asthma     DM (diabetes mellitus) ([address]     HTN (hypertension)     Morbid obesity due to excess calories ([address]     Obstructive sleep apnea syndrome     Pulmonary hypertension ([address]     Retention of urine        PAST SURGICAL Hx:   Past Surgical History:   Procedure Laterality Date    APPENDECTOMY  1985    CATARACT REMOVAL Bilateral 2017    [address] (CERVIX STATUS UNKNOWN)  2017    JOINT REPLACEMENT Left 2009    knee    NERVE BLOCK N/A 12/03/2014    cervical #1    NERVE BLOCK N/A 12/10/2014    cervical epidural #2    NERVE BLOCK  12/17/2014    cerical epidural #3    UPPER GASTROINTESTINAL ENDOSCOPY N/A 10/17/2022    EGD BIOPSY performed by Noemi Singh MD at [address] [facility] Hx:  Family History   Problem Relation Age of Onset    Cancer Father     Diabetes Mother     Hypertension Mother        HOME MEDICATIONS:  Prior to Admission medications    Medication Sig Start Date End Date Taking? Authorizing Provider   lidocaine (LIDODERM) 5 % Place 1 patch onto the skin daily 12 hours on, 12 hours off. Historical Provider, MD   pantoprazole (PROTONIX) 40 MG tablet Take 40 mg by mouth 2 times daily    Historical Provider, MD   levothyroxine (SYNTHROID) 25 MCG tablet Take 50 mcg by mouth Daily    Historical Provider, MD   fluticasone 200 MCG/ACT AEPB Inhale 1 puff into the lungs daily    Historical Provider, MD   albuterol sulfate  (90 Base) MCG/ACT inhaler Inhale 2 puffs into the lungs as needed for Wheezing    Historical Provider, MD   metFORMIN (GLUCOPHAGE) 500 MG tablet Take 500 mg by mouth 2 times daily (with meals)    Historical Provider, MD   allopurinol (ZYLOPRIM) 100 MG tablet Take 200 mg by mouth daily    Historical Provider, MD   hydrochlorothiazide (MICROZIDE) 12.5 MG capsule Take 12.5 mg by mouth daily. Historical Provider, MD   oxyCODONE-acetaminophen (PERCOCET) 5-325 MG per tablet Take 1 tablet by mouth every 6 hours as needed for Pain. Historical Provider, MD   montelukast (SINGULAIR) 10 MG tablet Take 10 mg by mouth nightly. Historical Provider, MD   metoprolol (LOPRESSOR) 25 MG tablet Take 25 mg by mouth 2 times daily. Historical Provider, MD   cyclobenzaprine (FLEXERIL) 10 MG tablet Take 10 mg by mouth 3 times daily as needed for Muscle spasms    Historical Provider, MD   diclofenac sodium (VOLTAREN) 1 % GEL Apply 2 g topically 2 times daily.     Historical Provider, MD       ALLERGIES:  [place] [aspirin], Ibuprofen, Norco [hydrocodone-acetaminophen], and Ultram [tramadol]    SOCIAL Hx:  Social History     Socioeconomic History    Marital status: Single     Spouse name: Not on file    Number of children: Not on file    Years of education: Not on file    Highest education level: Not on file   Occupational History    Not on file   Tobacco Use    Smoking status: Never    Smokeless tobacco: Never   Vaping Use    Vaping Use: Unknown   Substance and Sexual Activity    Alcohol use: No    Drug use: No    Sexual activity: Not on file   Other Topics Concern    Not on file   Social History Narrative    Not on file     Social Determinants of Health     Financial Resource Strain: Not on file   Food Insecurity: Not on file   Transportation Needs: Not on file   Physical Activity: Not on file   Stress: Not on file   Social Connections: Not on file   Intimate Partner Violence: Not on file   Housing Stability: Not on file       ROS: Positive in bold  General:   Denies chills, fatigue, fever, malaise, night sweats or weight loss    Psychological:   Denies anxiety, disorientation or hallucinations    ENT:    Denies epistaxis, headaches, vertigo or visual changes    Cardiovascular:   Denies any chest pain, irregular heartbeats, or palpitations. No paroxysmal nocturnal dyspnea. Respiratory:   Denies shortness of breath, coughing, sputum production, hemoptysis, or wheezing. No orthopnea. Gastrointestinal:   Denies nausea, vomiting, diarrhea, or constipation. Denies any abdominal pain. Denies change in bowel habits or stools. Genito-Urinary:    Denies any urgency, frequency, hematuria. Voiding without difficulty. Musculoskeletal:   Denies joint pain, joint stiffness, joint swelling or muscle pain    Neurology:    Denies any headache or focal neurological deficits. No weakness or paresthesia. Derm:    Denies any rashes, ulcers, or excoriations. Denies bruising. Extremities:   Denies any lower extremity swelling or edema.       PHYSICAL EXAM: Abnormal findings noted  VITALS:  Vitals:    01/03/23 0539   BP: (!) 158/87   Pulse: 60   Resp: 18   Temp: 98.1 °F (36.7 °C)   SpO2: 95%         CONSTITUTIONAL:    Awake, alert, cooperative, no apparent distress, and appears stated age    EYES:     EOMI, sclera clear, conjunctiva normal    ENT:    Normocephalic, atraumatic,  External ears without lesions. NECK:    Supple, symmetrical, trachea midline, no JVD    HEMATOLOGIC/LYMPHATICS:    No cervical lymphadenopathy and no supraclavicular lymphadenopathy    LUNGS:    Symmetric. No increased work of breathing, good air exchange, clear to auscultation bilaterally, no wheezes, rhonchi, or rales,   Patient has diminished breath sounds bilaterally    CARDIOVASCULAR:    Normal apical impulse, regular rate and rhythm, normal S1 and S2, no S3 or S4, and no murmur noted    ABDOMEN:    soft, non-distended, non-tender    MUSCULOSKELETAL:    There is no redness, warmth, or swelling of the joints. NEUROLOGIC:    Awake, alert, oriented to name, place and time. SKIN:    No bruising or bleeding. No redness, warmth, or swelling    EXTREMITIES:    Peripheral pulses present. No edema, cyanosis, or swelling.     LINES/CATHETERS     LABORATORY DATA:  CBC with Differential:    Lab Results   Component Value Date/Time    WBC 7.2 01/03/2023 10:10 AM    RBC 4.20 01/03/2023 10:10 AM    HGB 10.3 01/03/2023 10:10 AM    HCT 33.7 01/03/2023 10:10 AM     01/03/2023 10:10 AM    MCV 80.2 01/03/2023 10:10 AM    MCH 24.5 01/03/2023 10:10 AM    MCHC 30.6 01/03/2023 10:10 AM    RDW 15.2 01/03/2023 10:10 AM    METASPCT 0.9 01/03/2023 10:10 AM    LYMPHOPCT 4.4 01/03/2023 10:10 AM    MONOPCT 4.4 01/03/2023 10:10 AM    MYELOPCT 0.9 01/03/2023 10:10 AM    BASOPCT 0.1 01/03/2023 10:10 AM    MONOSABS 0.29 01/03/2023 10:10 AM    LYMPHSABS 0.29 01/03/2023 10:10 AM    EOSABS 0.00 01/03/2023 10:10 AM    BASOSABS 0.00 01/03/2023 10:10 AM     CMP:    Lab Results   Component Value Date/Time     01/03/2023 04:39 AM    K 4.9 01/03/2023 04:39 AM     01/03/2023 04:39 AM    CO2 29 01/03/2023 04:39 AM    BUN 27 01/03/2023 04:39 AM    CREATININE 0.8 01/03/2023 04:39 AM    GFRAA >60 10/17/2022 11:38 AM    LABGLOM >60 01/03/2023 04:39 AM    GLUCOSE 174 01/03/2023 04:39 AM    PROT 6.7 01/03/2023 04:39 AM    LABALBU 3.7 01/03/2023 04:39 AM    CALCIUM 8.1 01/03/2023 04:39 AM    BILITOT 0.3 01/03/2023 04:39 AM    ALKPHOS 83 01/03/2023 04:39 AM    AST 19 01/03/2023 04:39 AM    ALT 15 01/03/2023 04:39 AM       ASSESSMENT/PLAN:  Acute COPD exacerbation   Acute on chronic normocytic anemia  Acute hypoxic respiratory failure  Sleep apnea without use of CPAP  Mild pulmonary hypertension  Mild tricuspid regurgitation  Non-insulin-dependent diabetes mellitus  Hypertension  Morbid obesity  Acute on chronic low back pain from degenerative disc disease    Patient presented with shortness of breath. Patient found to be in acute COPD exacerbation. Patient placed on nebulized breathing treatments and IV steroids. Viral respiratory panel ordered. Continue to monitor pulse ox.     Home medication reviewed    O2 saturation on room air at rest and if greater than 89% with activity and monitor heart rate    W73, folic acid level    Heating pad to low back  Flexeril 10 mg 3 times daily    Proventil aerosol twice daily    Geofm Decree, DO  11:23 AM  1/3/2023

## 2023-01-04 ENCOUNTER — APPOINTMENT (OUTPATIENT)
Dept: CT IMAGING | Age: 63
DRG: 190 | End: 2023-01-04
Payer: MEDICARE

## 2023-01-04 LAB
ALBUMIN SERPL-MCNC: 3.8 G/DL (ref 3.5–5.2)
ALP BLD-CCNC: 84 U/L (ref 35–104)
ALT SERPL-CCNC: 17 U/L (ref 0–32)
ANION GAP SERPL CALCULATED.3IONS-SCNC: 11 MMOL/L (ref 7–16)
AST SERPL-CCNC: 15 U/L (ref 0–31)
BASOPHILS ABSOLUTE: 0.01 E9/L (ref 0–0.2)
BASOPHILS RELATIVE PERCENT: 0.1 % (ref 0–2)
BILIRUB SERPL-MCNC: 0.3 MG/DL (ref 0–1.2)
BUN BLDV-MCNC: 25 MG/DL (ref 6–23)
CALCIUM SERPL-MCNC: 8.1 MG/DL (ref 8.6–10.2)
CHLORIDE BLD-SCNC: 98 MMOL/L (ref 98–107)
CO2: 28 MMOL/L (ref 22–29)
CREAT SERPL-MCNC: 0.9 MG/DL (ref 0.5–1)
EOSINOPHILS ABSOLUTE: 0 E9/L (ref 0.05–0.5)
EOSINOPHILS RELATIVE PERCENT: 0 % (ref 0–6)
GFR SERPL CREATININE-BSD FRML MDRD: >60 ML/MIN/1.73
GLUCOSE BLD-MCNC: 218 MG/DL (ref 74–99)
HCT VFR BLD CALC: 36.3 % (ref 34–48)
HEMOGLOBIN: 10.7 G/DL (ref 11.5–15.5)
IMMATURE GRANULOCYTES #: 0.1 E9/L
IMMATURE GRANULOCYTES %: 1.3 % (ref 0–5)
LYMPHOCYTES ABSOLUTE: 0.39 E9/L (ref 1.5–4)
LYMPHOCYTES RELATIVE PERCENT: 5 % (ref 20–42)
MCH RBC QN AUTO: 23.7 PG (ref 26–35)
MCHC RBC AUTO-ENTMCNC: 29.5 % (ref 32–34.5)
MCV RBC AUTO: 80.5 FL (ref 80–99.9)
METER GLUCOSE: 198 MG/DL (ref 74–99)
METER GLUCOSE: 217 MG/DL (ref 74–99)
METER GLUCOSE: 225 MG/DL (ref 74–99)
METER GLUCOSE: 238 MG/DL (ref 74–99)
MONOCYTES ABSOLUTE: 0.28 E9/L (ref 0.1–0.95)
MONOCYTES RELATIVE PERCENT: 3.6 % (ref 2–12)
NEUTROPHILS ABSOLUTE: 7.03 E9/L (ref 1.8–7.3)
NEUTROPHILS RELATIVE PERCENT: 90 % (ref 43–80)
PDW BLD-RTO: 14.7 FL (ref 11.5–15)
PLATELET # BLD: 144 E9/L (ref 130–450)
PMV BLD AUTO: ABNORMAL FL (ref 7–12)
POTASSIUM SERPL-SCNC: 4.8 MMOL/L (ref 3.5–5)
RBC # BLD: 4.51 E12/L (ref 3.5–5.5)
SODIUM BLD-SCNC: 137 MMOL/L (ref 132–146)
TOTAL PROTEIN: 6.3 G/DL (ref 6.4–8.3)
WBC # BLD: 7.8 E9/L (ref 4.5–11.5)

## 2023-01-04 PROCEDURE — 2700000000 HC OXYGEN THERAPY PER DAY

## 2023-01-04 PROCEDURE — 6370000000 HC RX 637 (ALT 250 FOR IP): Performed by: INTERNAL MEDICINE

## 2023-01-04 PROCEDURE — 6360000002 HC RX W HCPCS: Performed by: INTERNAL MEDICINE

## 2023-01-04 PROCEDURE — 94640 AIRWAY INHALATION TREATMENT: CPT

## 2023-01-04 PROCEDURE — 1200000000 HC SEMI PRIVATE

## 2023-01-04 PROCEDURE — 71260 CT THORAX DX C+: CPT

## 2023-01-04 PROCEDURE — 36415 COLL VENOUS BLD VENIPUNCTURE: CPT

## 2023-01-04 PROCEDURE — 2580000003 HC RX 258: Performed by: INTERNAL MEDICINE

## 2023-01-04 PROCEDURE — 80053 COMPREHEN METABOLIC PANEL: CPT

## 2023-01-04 PROCEDURE — 82962 GLUCOSE BLOOD TEST: CPT

## 2023-01-04 PROCEDURE — 85025 COMPLETE CBC W/AUTO DIFF WBC: CPT

## 2023-01-04 RX ORDER — MEPERIDINE HYDROCHLORIDE 25 MG/ML
50 INJECTION INTRAMUSCULAR; INTRAVENOUS; SUBCUTANEOUS ONCE
Status: COMPLETED | OUTPATIENT
Start: 2023-01-04 | End: 2023-01-05

## 2023-01-04 RX ADMIN — INSULIN LISPRO 2 UNITS: 100 INJECTION, SOLUTION INTRAVENOUS; SUBCUTANEOUS at 16:37

## 2023-01-04 RX ADMIN — IPRATROPIUM BROMIDE AND ALBUTEROL SULFATE 1 AMPULE: .5; 2.5 SOLUTION RESPIRATORY (INHALATION) at 15:18

## 2023-01-04 RX ADMIN — METHYLPREDNISOLONE SODIUM SUCCINATE 40 MG: 40 INJECTION, POWDER, FOR SOLUTION INTRAMUSCULAR; INTRAVENOUS at 07:34

## 2023-01-04 RX ADMIN — HYDROCHLOROTHIAZIDE 12.5 MG: 12.5 TABLET ORAL at 07:37

## 2023-01-04 RX ADMIN — BUDESONIDE 500 MCG: 0.5 SUSPENSION RESPIRATORY (INHALATION) at 06:58

## 2023-01-04 RX ADMIN — ALLOPURINOL 200 MG: 100 TABLET ORAL at 07:34

## 2023-01-04 RX ADMIN — ENOXAPARIN SODIUM 30 MG: 100 INJECTION SUBCUTANEOUS at 07:34

## 2023-01-04 RX ADMIN — CYCLOBENZAPRINE HYDROCHLORIDE 10 MG: 5 TABLET, FILM COATED ORAL at 20:41

## 2023-01-04 RX ADMIN — METHYLPREDNISOLONE SODIUM SUCCINATE 40 MG: 40 INJECTION, POWDER, FOR SOLUTION INTRAMUSCULAR; INTRAVENOUS at 00:36

## 2023-01-04 RX ADMIN — LEVOTHYROXINE SODIUM 50 MCG: 0.05 TABLET ORAL at 05:39

## 2023-01-04 RX ADMIN — CYCLOBENZAPRINE HYDROCHLORIDE 10 MG: 5 TABLET, FILM COATED ORAL at 13:09

## 2023-01-04 RX ADMIN — OXYCODONE AND ACETAMINOPHEN 1 TABLET: 5; 325 TABLET ORAL at 00:41

## 2023-01-04 RX ADMIN — IPRATROPIUM BROMIDE AND ALBUTEROL SULFATE 1 AMPULE: .5; 2.5 SOLUTION RESPIRATORY (INHALATION) at 10:30

## 2023-01-04 RX ADMIN — METOPROLOL TARTRATE 25 MG: 25 TABLET, FILM COATED ORAL at 20:41

## 2023-01-04 RX ADMIN — OXYCODONE AND ACETAMINOPHEN 1 TABLET: 5; 325 TABLET ORAL at 07:33

## 2023-01-04 RX ADMIN — IPRATROPIUM BROMIDE AND ALBUTEROL SULFATE 1 AMPULE: .5; 2.5 SOLUTION RESPIRATORY (INHALATION) at 06:57

## 2023-01-04 RX ADMIN — METHYLPREDNISOLONE SODIUM SUCCINATE 40 MG: 40 INJECTION, POWDER, FOR SOLUTION INTRAMUSCULAR; INTRAVENOUS at 16:40

## 2023-01-04 RX ADMIN — METOPROLOL TARTRATE 25 MG: 25 TABLET, FILM COATED ORAL at 07:33

## 2023-01-04 RX ADMIN — ARFORMOTEROL TARTRATE 15 MCG: 15 SOLUTION RESPIRATORY (INHALATION) at 06:58

## 2023-01-04 RX ADMIN — Medication 10 ML: at 20:40

## 2023-01-04 RX ADMIN — ENOXAPARIN SODIUM 30 MG: 100 INJECTION SUBCUTANEOUS at 20:41

## 2023-01-04 RX ADMIN — INSULIN LISPRO 2 UNITS: 100 INJECTION, SOLUTION INTRAVENOUS; SUBCUTANEOUS at 11:29

## 2023-01-04 RX ADMIN — IPRATROPIUM BROMIDE AND ALBUTEROL SULFATE 1 AMPULE: .5; 2.5 SOLUTION RESPIRATORY (INHALATION) at 19:18

## 2023-01-04 RX ADMIN — PANTOPRAZOLE SODIUM 40 MG: 40 TABLET, DELAYED RELEASE ORAL at 20:41

## 2023-01-04 RX ADMIN — PANTOPRAZOLE SODIUM 40 MG: 40 TABLET, DELAYED RELEASE ORAL at 07:34

## 2023-01-04 RX ADMIN — CYCLOBENZAPRINE HYDROCHLORIDE 10 MG: 5 TABLET, FILM COATED ORAL at 07:33

## 2023-01-04 RX ADMIN — Medication 10 ML: at 07:34

## 2023-01-04 RX ADMIN — OXYCODONE AND ACETAMINOPHEN 1 TABLET: 5; 325 TABLET ORAL at 13:09

## 2023-01-04 RX ADMIN — DICLOFENAC SODIUM 2 G: 10 GEL TOPICAL at 20:43

## 2023-01-04 RX ADMIN — DICLOFENAC SODIUM 2 G: 10 GEL TOPICAL at 07:37

## 2023-01-04 RX ADMIN — ARFORMOTEROL TARTRATE 15 MCG: 15 SOLUTION RESPIRATORY (INHALATION) at 19:18

## 2023-01-04 RX ADMIN — BUDESONIDE 500 MCG: 0.5 SUSPENSION RESPIRATORY (INHALATION) at 19:18

## 2023-01-04 RX ADMIN — MONTELUKAST 10 MG: 10 TABLET, FILM COATED ORAL at 20:41

## 2023-01-04 ASSESSMENT — PAIN DESCRIPTION - DESCRIPTORS
DESCRIPTORS: DISCOMFORT
DESCRIPTORS: DISCOMFORT
DESCRIPTORS: DULL;DISCOMFORT;ACHING

## 2023-01-04 ASSESSMENT — PAIN SCALES - GENERAL
PAINLEVEL_OUTOF10: 9
PAINLEVEL_OUTOF10: 3
PAINLEVEL_OUTOF10: 9
PAINLEVEL_OUTOF10: 7
PAINLEVEL_OUTOF10: 6
PAINLEVEL_OUTOF10: 3

## 2023-01-04 ASSESSMENT — PAIN DESCRIPTION - LOCATION
LOCATION: BACK

## 2023-01-04 ASSESSMENT — PAIN DESCRIPTION - PAIN TYPE
TYPE: CHRONIC PAIN
TYPE: CHRONIC PAIN

## 2023-01-04 ASSESSMENT — PAIN DESCRIPTION - ORIENTATION: ORIENTATION: MID;LOWER

## 2023-01-04 NOTE — PLAN OF CARE
Problem: Pain  Goal: Verbalizes/displays adequate comfort level or baseline comfort level  1/4/2023 1325 by Юлия Witt RN  Outcome: Progressing  1/3/2023 2354 by Gianluca Michael RN  Outcome: Progressing     Problem: Chronic Conditions and Co-morbidities  Goal: Patient's chronic conditions and co-morbidity symptoms are monitored and maintained or improved  1/4/2023 1325 by Юлия Witt RN  Outcome: Progressing  1/3/2023 2354 by Gianluca Michael RN  Outcome: Progressing     Problem: Safety - Adult  Goal: Free from fall injury  1/4/2023 1325 by Юлия Witt RN  Outcome: Progressing  1/3/2023 2354 by Gianluca Michael RN  Outcome: Progressing     Problem: Discharge Planning  Goal: Discharge to home or other facility with appropriate resources  1/4/2023 1325 by Юлия Witt RN  Outcome: Progressing  1/3/2023 2354 by Gianluca Michael RN  Outcome: Progressing     Problem: Metabolic/Fluid and Electrolytes - Adult  Goal: Glucose maintained within prescribed range  1/4/2023 1325 by Юлия Witt RN  Outcome: Progressing  1/3/2023 2354 by Gianluca Michael RN  Outcome: Progressing

## 2023-01-04 NOTE — PLAN OF CARE
Problem: Pain  Goal: Verbalizes/displays adequate comfort level or baseline comfort level  1/3/2023 2354 by Gaby Brooks RN  Outcome: Progressing     Problem: Chronic Conditions and Co-morbidities  Goal: Patient's chronic conditions and co-morbidity symptoms are monitored and maintained or improved  1/3/2023 2354 by Gaby Brooks RN  Outcome: Progressing     Problem: Safety - Adult  Goal: Free from fall injury  1/3/2023 2354 by Gaby Brooks RN  Outcome: Progressing     Problem: Discharge Planning  Goal: Discharge to home or other facility with appropriate resources  1/3/2023 2354 by Gaby Brooks RN  Outcome: Progressing     Problem: Metabolic/Fluid and Electrolytes - Adult  Goal: Glucose maintained within prescribed range  1/3/2023 2354 by Gaby Brooks RN  Outcome: Progressing

## 2023-01-04 NOTE — PROGRESS NOTES
Department of Internal Medicine      PCP: Nellie Guadalupe DO  Admitting Physician: Dr. Ermelinda Riggs  Consultants:   Date of Service: 12/29/2022    CHIEF COMPLAINT:  sob    HISTORY OF PRESENT ILLNESS:    Patient is 68-year-old female who presented to the ED due to shortness of breath. patient admits to associated cough but denies any sputum production. She does admit to subjective fever and chills. She does have history of sleep apnea but does not use CPAP.    12/30/2022  Patient seen examined on monitored bed. Patient states he feels little bit better today. Patient denies any chest or abdominal pain. Patient still has shortness of breath with activity. BUN/creatinine 12/0.7 with normal liver enzymes. WBC 5.9 hemoglobin 9.9. Temperature is 97.8 with heart rate 82 blood pressure 153/93. O2 sat high percent on 2 L.    12/31/2022  Patient seen examined on monitored bed. Patient still feels short of breath with activity. Patient overall feels better. BUN/creatinine was 18/0.8 with normal electrolytes. Liver enzymes normal with WBC 8.1 hemoglobin 9.7. Temperature is 98.2 with heart rate 89 blood pressure 150/70. O2 sat 98% on 2 L nasal cannula. Lung auscultation does not show any expiratory wheezing now but just has some decreased coarse breath sounds. 1/1/2023  Patient seen examined on medical surgical floor. Patient states she had shortness of breath early in a.m. about 2 hours after she had her aerosol treatment. Patient states when she gets aerosol treatment makes her feel much better. BUN/creatinine 27/0.9 normal electrolytes. Blood sugars range 164-204. Liver enzymes normal with WBC 7.3 and hemoglobin 9.6. Temperature 98.5 heart rate 75 blood pressure 152/72. O2 sat 98% on 2 L nasal cannula and is 91% on room air at rest.  Because of the persistent shortness of breath which is relieved with aerosols we will add Brovana aerosol twice daily.     1/2/2023  Patient seen examined on medical surgical floor. Patient states she is feeling better today. Currently patient is on room air and ambulating to the bathroom with minimal difficulty. BUN/creatinine 30/1.9 with normal electrolytes. Blood sugars range 136-182. Liver enzymes normal with a WBC 7.5 and hemoglobin 10.2. Platelet count was 299. Temperature is 97.9 with heart rate of 74 blood pressure 155/85. O2 sat 98% on 2 L nasal cannula. Patient is to have O2 saturations on room air with activity today. 1/3/2023  Patient seen examined on medical surgical floor. Patient complaining of some chronic low back pain. Patient overall feels better but still short of breath with activity. She denies any chest or abdominal pain. BUN/creatinine 27/0.8 with normal electrolytes. Blood sugars range 174-253. Liver enzymes normal with WBC 7.2 and hemoglobin 10.3. Temperature is 98.1 with heart rate of 60 and blood pressure 158/87. O2 sat 95% on 2 L nasal cannula. Patient was ambulated in the hallway early yesterday afternoon with O2 sat at rest was 92% but with activity the O2 sat went down to 84%. We will add heating pad to low back along with change in Flexeril 10 mg 3 times daily. 1/4/2023  Patient seen examined on medical surgical floor. Patient complains of low back pain again today. Patient states she feels better again today but still says she is not back to her baseline. Patient denies any productive cough. There is no fever/chills, nausea/vomiting, chest pain or abdominal pain. Blood sugars ranging 198-250. Vitamin B12 level was within normal limits. Temperature is 98.4 with heart rate of 62 blood pressure ranges 153//92. O2 sat 98% on 3 L. Since the patient still is not back to baseline we will do a CT of the lungs today for further monitoring to see if patient has any underlying infiltrates.     PAST MEDICAL Hx:  Past Medical History:   Diagnosis Date    Arthritis     Asthma     DM (diabetes mellitus) ([facility] [address]     HTN (hypertension)     Morbid obesity due to excess calories ([facility] [address]     Obstructive sleep apnea syndrome     Pulmonary hypertension ([facility] [address]     Retention of urine        PAST SURGICAL Hx:   Past Surgical History:   Procedure Laterality Date    APPENDECTOMY  1985    CATARACT REMOVAL Bilateral 2017    [facility] (CERVIX STATUS UNKNOWN)  2017    JOINT REPLACEMENT Left 2009    knee    NERVE BLOCK N/A 12/03/2014    cervical #1    NERVE BLOCK N/A 12/10/2014    cervical epidural #2    NERVE BLOCK  12/17/2014    cerical epidural #3    UPPER GASTROINTESTINAL ENDOSCOPY N/A 10/17/2022    EGD BIOPSY performed by Noemi Singh MD at [facility] Hx:  Family History   Problem Relation Age of Onset    Cancer Father     Diabetes Mother     Hypertension Mother        HOME MEDICATIONS:  Prior to Admission medications    Medication Sig Start Date End Date Taking? Authorizing Provider   lidocaine (LIDODERM) 5 % Place 1 patch onto the skin daily 12 hours on, 12 hours off. Historical Provider, MD   pantoprazole (PROTONIX) 40 MG tablet Take 40 mg by mouth 2 times daily    Historical Provider, MD   levothyroxine (SYNTHROID) 25 MCG tablet Take 50 mcg by mouth Daily    Historical Provider, MD   fluticasone 200 MCG/ACT AEPB Inhale 1 puff into the lungs daily    Historical Provider, MD   albuterol sulfate  (90 Base) MCG/ACT inhaler Inhale 2 puffs into the lungs as needed for Wheezing    Historical Provider, MD   metFORMIN (GLUCOPHAGE) 500 MG tablet Take 500 mg by mouth 2 times daily (with meals)    Historical Provider, MD   allopurinol (ZYLOPRIM) 100 MG tablet Take 200 mg by mouth daily    Historical Provider, MD   hydrochlorothiazide (MICROZIDE) 12.5 MG capsule Take 12.5 mg by mouth daily. Historical Provider, MD   oxyCODONE-acetaminophen (PERCOCET) 5-325 MG per tablet Take 1 tablet by mouth every 6 hours as needed for Pain.      Historical Provider, MD   montelukast (SINGULAIR) 10 MG tablet Take 10 mg by mouth nightly. Historical Provider, MD   metoprolol (LOPRESSOR) 25 MG tablet Take 25 mg by mouth 2 times daily. Historical Provider, MD   cyclobenzaprine (FLEXERIL) 10 MG tablet Take 10 mg by mouth 3 times daily as needed for Muscle spasms    Historical Provider, MD   diclofenac sodium (VOLTAREN) 1 % GEL Apply 2 g topically 2 times daily. Historical Provider, MD       ALLERGIES:  Juanna Tara [aspirin], Ibuprofen, Norco [hydrocodone-acetaminophen], and Ultram [tramadol]    SOCIAL Hx:  Social History     Socioeconomic History    Marital status: Single     Spouse name: Not on file    Number of children: Not on file    Years of education: Not on file    Highest education level: Not on file   Occupational History    Not on file   Tobacco Use    Smoking status: Never    Smokeless tobacco: Never   Vaping Use    Vaping Use: Unknown   Substance and Sexual Activity    Alcohol use: No    Drug use: No    Sexual activity: Not on file   Other Topics Concern    Not on file   Social History Narrative    Not on file     Social Determinants of Health     Financial Resource Strain: Not on file   Food Insecurity: Not on file   Transportation Needs: Not on file   Physical Activity: Not on file   Stress: Not on file   Social Connections: Not on file   Intimate Partner Violence: Not on file   Housing Stability: Not on file       ROS: Positive in bold  General:   Denies chills, fatigue, fever, malaise, night sweats or weight loss    Psychological:   Denies anxiety, disorientation or hallucinations    ENT:    Denies epistaxis, headaches, vertigo or visual changes    Cardiovascular:   Denies any chest pain, irregular heartbeats, or palpitations. No paroxysmal nocturnal dyspnea. Respiratory:   Denies shortness of breath, coughing, sputum production, hemoptysis, or wheezing. No orthopnea. Gastrointestinal:   Denies nausea, vomiting, diarrhea, or constipation. Denies any abdominal pain.   Denies change in bowel habits or stools. Genito-Urinary:    Denies any urgency, frequency, hematuria. Voiding without difficulty. Musculoskeletal:   Denies joint pain, joint stiffness, joint swelling or muscle pain    Neurology:    Denies any headache or focal neurological deficits. No weakness or paresthesia. Derm:    Denies any rashes, ulcers, or excoriations. Denies bruising. Extremities:   Denies any lower extremity swelling or edema. PHYSICAL EXAM: Abnormal findings noted  VITALS:  Vitals:    01/04/23 0733   BP:    Pulse:    Resp:    Temp:    SpO2: 100%         CONSTITUTIONAL:    Awake, alert, cooperative, no apparent distress, and appears stated age    EYES:     EOMI, sclera clear, conjunctiva normal    ENT:    Normocephalic, atraumatic,  External ears without lesions. NECK:    Supple, symmetrical, trachea midline, no JVD    HEMATOLOGIC/LYMPHATICS:    No cervical lymphadenopathy and no supraclavicular lymphadenopathy    LUNGS:    Symmetric. No increased work of breathing, good air exchange, clear to auscultation bilaterally, no wheezes, rhonchi, or rales,   Patient has diminished breath sounds bilaterally    CARDIOVASCULAR:    Normal apical impulse, regular rate and rhythm, normal S1 and S2, no S3 or S4, and no murmur noted    ABDOMEN:    soft, non-distended, non-tender    MUSCULOSKELETAL:    There is no redness, warmth, or swelling of the joints. NEUROLOGIC:    Awake, alert, oriented to name, place and time. SKIN:    No bruising or bleeding. No redness, warmth, or swelling    EXTREMITIES:    Peripheral pulses present. No edema, cyanosis, or swelling.     LINES/CATHETERS     LABORATORY DATA:  CBC with Differential:    Lab Results   Component Value Date/Time    WBC 7.2 01/03/2023 10:10 AM    RBC 4.20 01/03/2023 10:10 AM    HGB 10.3 01/03/2023 10:10 AM    HCT 33.7 01/03/2023 10:10 AM     01/03/2023 10:10 AM    MCV 80.2 01/03/2023 10:10 AM    MCH 24.5 01/03/2023 10:10 AM MCHC 30.6 01/03/2023 10:10 AM    RDW 15.2 01/03/2023 10:10 AM    METASPCT 0.9 01/03/2023 10:10 AM    LYMPHOPCT 4.4 01/03/2023 10:10 AM    MONOPCT 4.4 01/03/2023 10:10 AM    MYELOPCT 0.9 01/03/2023 10:10 AM    BASOPCT 0.1 01/03/2023 10:10 AM    MONOSABS 0.29 01/03/2023 10:10 AM    LYMPHSABS 0.29 01/03/2023 10:10 AM    EOSABS 0.00 01/03/2023 10:10 AM    BASOSABS 0.00 01/03/2023 10:10 AM     CMP:    Lab Results   Component Value Date/Time     01/03/2023 04:39 AM    K 4.9 01/03/2023 04:39 AM     01/03/2023 04:39 AM    CO2 29 01/03/2023 04:39 AM    BUN 27 01/03/2023 04:39 AM    CREATININE 0.8 01/03/2023 04:39 AM    GFRAA >60 10/17/2022 11:38 AM    LABGLOM >60 01/03/2023 04:39 AM    GLUCOSE 174 01/03/2023 04:39 AM    PROT 6.7 01/03/2023 04:39 AM    LABALBU 3.7 01/03/2023 04:39 AM    CALCIUM 8.1 01/03/2023 04:39 AM    BILITOT 0.3 01/03/2023 04:39 AM    ALKPHOS 83 01/03/2023 04:39 AM    AST 19 01/03/2023 04:39 AM    ALT 15 01/03/2023 04:39 AM       ASSESSMENT/PLAN:  Acute COPD exacerbation   Acute on chronic normocytic anemia  Acute hypoxic respiratory failure  Sleep apnea without use of CPAP  Mild pulmonary hypertension  Mild tricuspid regurgitation  Non-insulin-dependent diabetes mellitus  Hypertension  Morbid obesity  Acute on chronic low back pain from degenerative disc disease    Patient presented with shortness of breath. Patient found to be in acute COPD exacerbation. Patient placed on nebulized breathing treatments and IV steroids. Viral respiratory panel ordered. Continue to monitor pulse ox.     Home medication reviewed    Proventil aerosol twice daily  Pulmicort aerosol twice daily  DuoNeb aerosols 4 times daily and every 4 as needed  Singular 10 mg daily    O2 saturation on room air at rest and if greater than 89% with activity and monitor heart rate    C70, folic acid level-normal    Heating pad to low back  Flexeril 10 mg 3 times daily    Demerol 50 mg IM x1    Pro Kirkland DO  10:31 AM  1/4/2023

## 2023-01-05 LAB
C-REACTIVE PROTEIN: <0.3 MG/DL (ref 0–0.4)
D DIMER: 236 NG/ML DDU
HCT VFR BLD CALC: 34.9 % (ref 34–48)
HEMOGLOBIN: 10.5 G/DL (ref 11.5–15.5)
METER GLUCOSE: 195 MG/DL (ref 74–99)
METER GLUCOSE: 243 MG/DL (ref 74–99)
METER GLUCOSE: 260 MG/DL (ref 74–99)
METER GLUCOSE: 281 MG/DL (ref 74–99)

## 2023-01-05 PROCEDURE — 6370000000 HC RX 637 (ALT 250 FOR IP): Performed by: INTERNAL MEDICINE

## 2023-01-05 PROCEDURE — 36415 COLL VENOUS BLD VENIPUNCTURE: CPT

## 2023-01-05 PROCEDURE — 86140 C-REACTIVE PROTEIN: CPT

## 2023-01-05 PROCEDURE — 85018 HEMOGLOBIN: CPT

## 2023-01-05 PROCEDURE — 94150 VITAL CAPACITY TEST: CPT

## 2023-01-05 PROCEDURE — 94640 AIRWAY INHALATION TREATMENT: CPT

## 2023-01-05 PROCEDURE — 85014 HEMATOCRIT: CPT

## 2023-01-05 PROCEDURE — 85378 FIBRIN DEGRADE SEMIQUANT: CPT

## 2023-01-05 PROCEDURE — 6360000002 HC RX W HCPCS: Performed by: INTERNAL MEDICINE

## 2023-01-05 PROCEDURE — 1200000000 HC SEMI PRIVATE

## 2023-01-05 PROCEDURE — 2580000003 HC RX 258: Performed by: INTERNAL MEDICINE

## 2023-01-05 PROCEDURE — 6360000004 HC RX CONTRAST MEDICATION: Performed by: RADIOLOGY

## 2023-01-05 PROCEDURE — 82962 GLUCOSE BLOOD TEST: CPT

## 2023-01-05 RX ADMIN — MEPERIDINE HYDROCHLORIDE 50 MG: 25 INJECTION INTRAMUSCULAR; INTRAVENOUS; SUBCUTANEOUS at 00:30

## 2023-01-05 RX ADMIN — METHYLPREDNISOLONE SODIUM SUCCINATE 40 MG: 40 INJECTION, POWDER, FOR SOLUTION INTRAMUSCULAR; INTRAVENOUS at 00:30

## 2023-01-05 RX ADMIN — CYCLOBENZAPRINE HYDROCHLORIDE 10 MG: 5 TABLET, FILM COATED ORAL at 13:44

## 2023-01-05 RX ADMIN — INSULIN LISPRO 2 UNITS: 100 INJECTION, SOLUTION INTRAVENOUS; SUBCUTANEOUS at 16:53

## 2023-01-05 RX ADMIN — OXYCODONE AND ACETAMINOPHEN 1 TABLET: 5; 325 TABLET ORAL at 03:24

## 2023-01-05 RX ADMIN — BUDESONIDE 500 MCG: 0.5 SUSPENSION RESPIRATORY (INHALATION) at 06:19

## 2023-01-05 RX ADMIN — ALLOPURINOL 200 MG: 100 TABLET ORAL at 08:54

## 2023-01-05 RX ADMIN — ENOXAPARIN SODIUM 30 MG: 100 INJECTION SUBCUTANEOUS at 08:54

## 2023-01-05 RX ADMIN — Medication 10 ML: at 08:58

## 2023-01-05 RX ADMIN — IPRATROPIUM BROMIDE AND ALBUTEROL SULFATE 1 AMPULE: .5; 2.5 SOLUTION RESPIRATORY (INHALATION) at 10:20

## 2023-01-05 RX ADMIN — ARFORMOTEROL TARTRATE 15 MCG: 15 SOLUTION RESPIRATORY (INHALATION) at 06:20

## 2023-01-05 RX ADMIN — METHYLPREDNISOLONE SODIUM SUCCINATE 40 MG: 40 INJECTION, POWDER, FOR SOLUTION INTRAMUSCULAR; INTRAVENOUS at 16:53

## 2023-01-05 RX ADMIN — Medication 10 ML: at 21:43

## 2023-01-05 RX ADMIN — BUDESONIDE 500 MCG: 0.5 SUSPENSION RESPIRATORY (INHALATION) at 18:47

## 2023-01-05 RX ADMIN — OXYCODONE AND ACETAMINOPHEN 1 TABLET: 5; 325 TABLET ORAL at 21:41

## 2023-01-05 RX ADMIN — HYDROCHLOROTHIAZIDE 12.5 MG: 12.5 TABLET ORAL at 08:54

## 2023-01-05 RX ADMIN — CYCLOBENZAPRINE HYDROCHLORIDE 10 MG: 5 TABLET, FILM COATED ORAL at 08:54

## 2023-01-05 RX ADMIN — IPRATROPIUM BROMIDE AND ALBUTEROL SULFATE 1 AMPULE: .5; 2.5 SOLUTION RESPIRATORY (INHALATION) at 18:47

## 2023-01-05 RX ADMIN — ENOXAPARIN SODIUM 30 MG: 100 INJECTION SUBCUTANEOUS at 21:41

## 2023-01-05 RX ADMIN — METOPROLOL TARTRATE 25 MG: 25 TABLET, FILM COATED ORAL at 08:56

## 2023-01-05 RX ADMIN — PANTOPRAZOLE SODIUM 40 MG: 40 TABLET, DELAYED RELEASE ORAL at 08:54

## 2023-01-05 RX ADMIN — IPRATROPIUM BROMIDE AND ALBUTEROL SULFATE 1 AMPULE: .5; 2.5 SOLUTION RESPIRATORY (INHALATION) at 15:15

## 2023-01-05 RX ADMIN — IPRATROPIUM BROMIDE AND ALBUTEROL SULFATE 1 AMPULE: .5; 2.5 SOLUTION RESPIRATORY (INHALATION) at 06:19

## 2023-01-05 RX ADMIN — IOPAMIDOL 75 ML: 755 INJECTION, SOLUTION INTRAVENOUS at 00:17

## 2023-01-05 RX ADMIN — METOPROLOL TARTRATE 25 MG: 25 TABLET, FILM COATED ORAL at 21:41

## 2023-01-05 RX ADMIN — LEVOTHYROXINE SODIUM 50 MCG: 0.05 TABLET ORAL at 05:46

## 2023-01-05 RX ADMIN — ARFORMOTEROL TARTRATE 15 MCG: 15 SOLUTION RESPIRATORY (INHALATION) at 18:47

## 2023-01-05 RX ADMIN — CYCLOBENZAPRINE HYDROCHLORIDE 10 MG: 5 TABLET, FILM COATED ORAL at 21:41

## 2023-01-05 RX ADMIN — DICLOFENAC SODIUM 2 G: 10 GEL TOPICAL at 21:43

## 2023-01-05 RX ADMIN — DICLOFENAC SODIUM 2 G: 10 GEL TOPICAL at 08:57

## 2023-01-05 RX ADMIN — MONTELUKAST 10 MG: 10 TABLET, FILM COATED ORAL at 21:41

## 2023-01-05 RX ADMIN — PANTOPRAZOLE SODIUM 40 MG: 40 TABLET, DELAYED RELEASE ORAL at 21:41

## 2023-01-05 RX ADMIN — METHYLPREDNISOLONE SODIUM SUCCINATE 40 MG: 40 INJECTION, POWDER, FOR SOLUTION INTRAMUSCULAR; INTRAVENOUS at 08:53

## 2023-01-05 RX ADMIN — INSULIN LISPRO 4 UNITS: 100 INJECTION, SOLUTION INTRAVENOUS; SUBCUTANEOUS at 11:30

## 2023-01-05 ASSESSMENT — PAIN DESCRIPTION - DESCRIPTORS
DESCRIPTORS: ACHING
DESCRIPTORS: ACHING;SHARP

## 2023-01-05 ASSESSMENT — PAIN DESCRIPTION - LOCATION
LOCATION: BACK
LOCATION: HEAD;NECK

## 2023-01-05 ASSESSMENT — PAIN SCALES - GENERAL
PAINLEVEL_OUTOF10: 8
PAINLEVEL_OUTOF10: 7

## 2023-01-05 ASSESSMENT — PAIN DESCRIPTION - ORIENTATION
ORIENTATION: LOWER
ORIENTATION: MID

## 2023-01-05 NOTE — PLAN OF CARE
Consult order placed.Consult order placed.     Problem: Pain  Goal: Verbalizes/displays adequate comfort level or baseline comfort level  1/4/2023 1932 by Verna Duran RN  Outcome: Progressing  1/4/2023 1325 by Cara Donato RN  Outcome: Progressing     Problem: Chronic Conditions and Co-morbidities  Goal: Patient's chronic conditions and co-morbidity symptoms are monitored and maintained or improved  1/4/2023 1932 by Verna Duran RN  Outcome: Progressing  1/4/2023 1325 by Cara Donato RN  Outcome: Progressing     Problem: Safety - Adult  Goal: Free from fall injury  1/4/2023 1932 by Verna Duran RN  Outcome: Progressing  1/4/2023 1325 by Cara Donato RN  Outcome: Progressing     Problem: Discharge Planning  Goal: Discharge to home or other facility with appropriate resources  1/4/2023 1932 by Verna Duran RN  Outcome: Progressing  1/4/2023 1325 by Cara Donato RN  Outcome: Progressing     Problem: Metabolic/Fluid and Electrolytes - Adult  Goal: Glucose maintained within prescribed range  1/4/2023 1932 by Verna Duran RN  Outcome: Progressing  1/4/2023 1325 by Cara Donato RN  Outcome: Progressing

## 2023-01-05 NOTE — PROGRESS NOTES
Patient was 96% on RA at rest. Patient ambulated and maintained at 92% on RA while ambulating. Back to bed, patient was 94% on RA. Patient did state that she felt it was difficult to \"pull air in\" while ambulating, but once back to bed patient stated she felt better.

## 2023-01-05 NOTE — PLAN OF CARE
Problem: Pain  Goal: Verbalizes/displays adequate comfort level or baseline comfort level  Outcome: Progressing     Problem: Chronic Conditions and Co-morbidities  Goal: Patient's chronic conditions and co-morbidity symptoms are monitored and maintained or improved  Outcome: Progressing     Problem: Safety - Adult  Goal: Free from fall injury  Outcome: Progressing     Problem: Discharge Planning  Goal: Discharge to home or other facility with appropriate resources  Outcome: Progressing     Problem: Metabolic/Fluid and Electrolytes - Adult  Goal: Glucose maintained within prescribed range  Outcome: Progressing

## 2023-01-05 NOTE — PROGRESS NOTES
Department of Internal Medicine      PCP: Migel Cai DO  Admitting Physician: Dr. Vanessa Rodrigez  Consultants:   Date of Service: 12/29/2022    CHIEF COMPLAINT:  sob    HISTORY OF PRESENT ILLNESS:    Patient is 49-year-old female who presented to the ED due to shortness of breath. patient admits to associated cough but denies any sputum production. She does admit to subjective fever and chills. She does have history of sleep apnea but does not use CPAP.    12/30/2022  Patient seen examined on monitored bed. Patient states he feels little bit better today. Patient denies any chest or abdominal pain. Patient still has shortness of breath with activity. BUN/creatinine 12/0.7 with normal liver enzymes. WBC 5.9 hemoglobin 9.9. Temperature is 97.8 with heart rate 82 blood pressure 153/93. O2 sat high percent on 2 L.    12/31/2022  Patient seen examined on monitored bed. Patient still feels short of breath with activity. Patient overall feels better. BUN/creatinine was 18/0.8 with normal electrolytes. Liver enzymes normal with WBC 8.1 hemoglobin 9.7. Temperature is 98.2 with heart rate 89 blood pressure 150/70. O2 sat 98% on 2 L nasal cannula. Lung auscultation does not show any expiratory wheezing now but just has some decreased coarse breath sounds. 1/1/2023  Patient seen examined on medical surgical floor. Patient states she had shortness of breath early in a.m. about 2 hours after she had her aerosol treatment. Patient states when she gets aerosol treatment makes her feel much better. BUN/creatinine 27/0.9 normal electrolytes. Blood sugars range 164-204. Liver enzymes normal with WBC 7.3 and hemoglobin 9.6. Temperature 98.5 heart rate 75 blood pressure 152/72. O2 sat 98% on 2 L nasal cannula and is 91% on room air at rest.  Because of the persistent shortness of breath which is relieved with aerosols we will add Brovana aerosol twice daily.     1/2/2023  Patient seen examined on medical surgical floor. Patient states she is feeling better today. Currently patient is on room air and ambulating to the bathroom with minimal difficulty. BUN/creatinine 30/1.9 with normal electrolytes. Blood sugars range 136-182. Liver enzymes normal with a WBC 7.5 and hemoglobin 10.2. Platelet count was 600. Temperature is 97.9 with heart rate of 74 blood pressure 155/85. O2 sat 98% on 2 L nasal cannula. Patient is to have O2 saturations on room air with activity today. 1/3/2023  Patient seen examined on medical surgical floor. Patient complaining of some chronic low back pain. Patient overall feels better but still short of breath with activity. She denies any chest or abdominal pain. BUN/creatinine 27/0.8 with normal electrolytes. Blood sugars range 174-253. Liver enzymes normal with WBC 7.2 and hemoglobin 10.3. Temperature is 98.1 with heart rate of 60 and blood pressure 158/87. O2 sat 95% on 2 L nasal cannula. Patient was ambulated in the hallway early yesterday afternoon with O2 sat at rest was 92% but with activity the O2 sat went down to 84%. We will add heating pad to low back along with change in Flexeril 10 mg 3 times daily. 1/4/2023  Patient seen examined on medical surgical floor. Patient complains of low back pain again today. Patient states she feels better again today but still says she is not back to her baseline. Patient denies any productive cough. There is no fever/chills, nausea/vomiting, chest pain or abdominal pain. Blood sugars ranging 198-250. Vitamin B12 level was within normal limits. Temperature is 98.4 with heart rate of 62 blood pressure ranges 153//92. O2 sat 98% on 3 L. Since the patient still is not back to baseline we will do a CT of the lungs today for further monitoring to see if patient has any underlying infiltrates. 1/5/2023  Patient seen examined on medical surgical floor. Patient states she still gets short of breath with activity. Patient denies any chest pain, abdominal pain, nausea vomiting, cough. Lung sounds on auscultation are essentially clear with no expiratory wheezing rales or rhonchi. Blood sugars range 195-238. Hemoglobin 10.5. Temperature 97.6 with heart rate of 62 and blood pressure 133/75. O2 sat is 100% on 2 L nasal cannula. Ambulate patient in the hallway again today on room air to monitor O2 saturation along with patient's heart rate. PAST MEDICAL Hx:  Past Medical History:   Diagnosis Date    Arthritis     Asthma     DM (diabetes mellitus) ([address]     HTN (hypertension)     Morbid obesity due to excess calories ([address]     Obstructive sleep apnea syndrome     Pulmonary hypertension ([facility] [address]     Retention of urine        PAST SURGICAL Hx:   Past Surgical History:   Procedure Laterality Date    APPENDECTOMY  1985    CATARACT REMOVAL Bilateral 2017    [address] (CERVIX STATUS UNKNOWN)  2017    JOINT REPLACEMENT Left 2009    knee    NERVE BLOCK N/A 12/03/2014    cervical #1    NERVE BLOCK N/A 12/10/2014    cervical epidural #2    NERVE BLOCK  12/17/2014    cerical epidural #3    UPPER GASTROINTESTINAL ENDOSCOPY N/A 10/17/2022    EGD BIOPSY performed by Lidia Davis MD at [address] Hx:  Family History   Problem Relation Age of Onset    Cancer Father     Diabetes Mother     Hypertension Mother        HOME MEDICATIONS:  Prior to Admission medications    Medication Sig Start Date End Date Taking? Authorizing Provider   lidocaine (LIDODERM) 5 % Place 1 patch onto the skin daily 12 hours on, 12 hours off.     Historical Provider, MD   pantoprazole (PROTONIX) 40 MG tablet Take 40 mg by mouth 2 times daily    Historical Provider, MD   levothyroxine (SYNTHROID) 25 MCG tablet Take 50 mcg by mouth Daily    Historical Provider, MD   fluticasone 200 MCG/ACT AEPB Inhale 1 puff into the lungs daily    Historical Provider, MD   albuterol sulfate  (90 Base) MCG/ACT inhaler Inhale 2 puffs into the lungs as needed for Wheezing    Historical Provider, MD   metFORMIN (GLUCOPHAGE) 500 MG tablet Take 500 mg by mouth 2 times daily (with meals)    Historical Provider, MD   allopurinol (ZYLOPRIM) 100 MG tablet Take 200 mg by mouth daily    Historical Provider, MD   hydrochlorothiazide (MICROZIDE) 12.5 MG capsule Take 12.5 mg by mouth daily. Historical Provider, MD   oxyCODONE-acetaminophen (PERCOCET) 5-325 MG per tablet Take 1 tablet by mouth every 6 hours as needed for Pain. Historical Provider, MD   montelukast (SINGULAIR) 10 MG tablet Take 10 mg by mouth nightly. Historical Provider, MD   metoprolol (LOPRESSOR) 25 MG tablet Take 25 mg by mouth 2 times daily. Historical Provider, MD   cyclobenzaprine (FLEXERIL) 10 MG tablet Take 10 mg by mouth 3 times daily as needed for Muscle spasms    Historical Provider, MD   diclofenac sodium (VOLTAREN) 1 % GEL Apply 2 g topically 2 times daily.     Historical Provider, MD       ALLERGIES:  Alon Wendell [aspirin], Ibuprofen, Norco [hydrocodone-acetaminophen], and Ultram [tramadol]    SOCIAL Hx:  Social History     Socioeconomic History    Marital status: Single     Spouse name: Not on file    Number of children: Not on file    Years of education: Not on file    Highest education level: Not on file   Occupational History    Not on file   Tobacco Use    Smoking status: Never    Smokeless tobacco: Never   Vaping Use    Vaping Use: Unknown   Substance and Sexual Activity    Alcohol use: No    Drug use: No    Sexual activity: Not on file   Other Topics Concern    Not on file   Social History Narrative    Not on file     Social Determinants of Health     Financial Resource Strain: Not on file   Food Insecurity: Not on file   Transportation Needs: Not on file   Physical Activity: Not on file   Stress: Not on file   Social Connections: Not on file   Intimate Partner Violence: Not on file   Housing Stability: Not on file       ROS: Positive in bold  General:   Denies chills, fatigue, fever, malaise, night sweats or weight loss    Psychological:   Denies anxiety, disorientation or hallucinations    ENT:    Denies epistaxis, headaches, vertigo or visual changes    Cardiovascular:   Denies any chest pain, irregular heartbeats, or palpitations. No paroxysmal nocturnal dyspnea. Respiratory:   Denies shortness of breath, coughing, sputum production, hemoptysis, or wheezing. No orthopnea. Gastrointestinal:   Denies nausea, vomiting, diarrhea, or constipation. Denies any abdominal pain. Denies change in bowel habits or stools. Genito-Urinary:    Denies any urgency, frequency, hematuria. Voiding without difficulty. Musculoskeletal:   Denies joint pain, joint stiffness, joint swelling or muscle pain    Neurology:    Denies any headache or focal neurological deficits. No weakness or paresthesia. Derm:    Denies any rashes, ulcers, or excoriations. Denies bruising. Extremities:   Denies any lower extremity swelling or edema. PHYSICAL EXAM: Abnormal findings noted  VITALS:  Vitals:    01/05/23 0845   BP:    Pulse: 62   Resp:    Temp:    SpO2:          CONSTITUTIONAL:    Awake, alert, cooperative, no apparent distress, and appears stated age    EYES:     EOMI, sclera clear, conjunctiva normal    ENT:    Normocephalic, atraumatic,  External ears without lesions. NECK:    Supple, symmetrical, trachea midline, no JVD    HEMATOLOGIC/LYMPHATICS:    No cervical lymphadenopathy and no supraclavicular lymphadenopathy    LUNGS:    Symmetric.  No increased work of breathing, good air exchange, clear to auscultation bilaterally, no wheezes, rhonchi, or rales,   Patient has diminished breath sounds bilaterally    CARDIOVASCULAR:    Normal apical impulse, regular rate and rhythm, normal S1 and S2, no S3 or S4, and no murmur noted    ABDOMEN:    soft, non-distended, non-tender    MUSCULOSKELETAL:    There is no redness, warmth, or swelling of the joints. NEUROLOGIC:    Awake, alert, oriented to name, place and time. SKIN:    No bruising or bleeding. No redness, warmth, or swelling    EXTREMITIES:    Peripheral pulses present. No edema, cyanosis, or swelling. LINES/CATHETERS     LABORATORY DATA:  CBC with Differential:    Lab Results   Component Value Date/Time    WBC 7.8 01/04/2023 10:01 AM    RBC 4.51 01/04/2023 10:01 AM    HGB 10.5 01/05/2023 04:34 AM    HCT 34.9 01/05/2023 04:34 AM     01/04/2023 10:01 AM    MCV 80.5 01/04/2023 10:01 AM    MCH 23.7 01/04/2023 10:01 AM    MCHC 29.5 01/04/2023 10:01 AM    RDW 14.7 01/04/2023 10:01 AM    METASPCT 0.9 01/03/2023 10:10 AM    LYMPHOPCT 5.0 01/04/2023 10:01 AM    MONOPCT 3.6 01/04/2023 10:01 AM    MYELOPCT 0.9 01/03/2023 10:10 AM    BASOPCT 0.1 01/04/2023 10:01 AM    MONOSABS 0.28 01/04/2023 10:01 AM    LYMPHSABS 0.39 01/04/2023 10:01 AM    EOSABS 0.00 01/04/2023 10:01 AM    BASOSABS 0.01 01/04/2023 10:01 AM     CMP:    Lab Results   Component Value Date/Time     01/04/2023 10:01 AM    K 4.8 01/04/2023 10:01 AM    CL 98 01/04/2023 10:01 AM    CO2 28 01/04/2023 10:01 AM    BUN 25 01/04/2023 10:01 AM    CREATININE 0.9 01/04/2023 10:01 AM    GFRAA >60 10/17/2022 11:38 AM    LABGLOM >60 01/04/2023 10:01 AM    GLUCOSE 218 01/04/2023 10:01 AM    PROT 6.3 01/04/2023 10:01 AM    LABALBU 3.8 01/04/2023 10:01 AM    CALCIUM 8.1 01/04/2023 10:01 AM    BILITOT 0.3 01/04/2023 10:01 AM    ALKPHOS 84 01/04/2023 10:01 AM    AST 15 01/04/2023 10:01 AM    ALT 17 01/04/2023 10:01 AM       ASSESSMENT/PLAN:  Acute COPD exacerbation   Acute on chronic normocytic anemia  Acute hypoxic respiratory failure  Sleep apnea without use of CPAP  Mild pulmonary hypertension  Mild tricuspid regurgitation  Non-insulin-dependent diabetes mellitus  Hypertension  Morbid obesity  Acute on chronic low back pain from degenerative disc disease    Patient presented with shortness of breath.   Patient found to be in acute COPD exacerbation. Patient placed on nebulized breathing treatments and IV steroids. Viral respiratory panel ordered. Continue to monitor pulse ox.     Home medication reviewed    Proventil aerosol twice daily  Pulmicort aerosol twice daily  DuoNeb aerosols 4 times daily and every 4 as needed  Singular 10 mg daily    O2 saturation on room air at rest and if greater than 89% with activity and monitor heart rate    L77, folic acid level-normal    Heating pad to low back  Flexeril 10 mg 3 times daily    Demerol 50 mg IM akshat Ruiz DO  10:36 AM  1/5/2023

## 2023-01-05 NOTE — PROGRESS NOTES
Comprehensive Nutrition Assessment    Type and Reason for Visit:  Initial (LOS)    Nutrition Recommendations/Plan:   Patient with adequate PO, will continue to monitor. Nutrition Assessment:    Pt adm d/t SOB w/ resp failure 2/2 COPD exacerbation. Hx DM. Pt w/ consuming adequate PO. Will continue to monitor. Nutrition Related Findings:    A&Ox4, abd WNL, no edema, +2.9L I/Os Wound Type: None       Current Nutrition Intake & Therapies:    Average Meal Intake: 51-75%  Average Supplements Intake: None Ordered  ADULT DIET; Regular    Anthropometric Measures:  Height: 5' 2\" (157.5 cm)  Ideal Body Weight (IBW): 110 lbs (50 kg)    Admission Body Weight: 302 lb 11.1 oz (137.3 kg) (12/29 actual)  Current Body Weight: 302 lb 11.1 oz (137.3 kg) (12/29 actual),   IBW.     Current BMI (kg/m2): 55.3  Usual Body Weight: 302 lb 10 oz (137.3 kg) (10/2022 actual x 2 months)  % Weight Change (Calculated): 0  Weight Adjustment For: No Adjustment  BMI Categories: Obese Class 3 (BMI 40.0 or greater)    Nutrition Diagnosis:   No nutrition diagnosis at this time     Nutrition Interventions:   Food and/or Nutrient Delivery: Continue Current Diet  Nutrition Education/Counseling: No recommendation at this time  Coordination of Nutrition Care: Continue to monitor while inpatient  Goals:  Goals: PO intake 75% or greater    Nutrition Monitoring and Evaluation:   Behavioral-Environmental Outcomes: None Identified  Food/Nutrient Intake Outcomes: Food and Nutrient Intake  Physical Signs/Symptoms Outcomes: Biochemical Data, Nutrition Focused Physical Findings, Skin, Weight, Chewing or Swallowing, Fluid Status or Edema, GI Status    Discharge Planning:    No discharge needs at this time     Caridad Venegas, MS, RD, LD  Contact: [phone]

## 2023-01-06 LAB
METER GLUCOSE: 178 MG/DL (ref 74–99)
METER GLUCOSE: 237 MG/DL (ref 74–99)
METER GLUCOSE: 255 MG/DL (ref 74–99)
METER GLUCOSE: 333 MG/DL (ref 74–99)

## 2023-01-06 PROCEDURE — 6360000002 HC RX W HCPCS: Performed by: INTERNAL MEDICINE

## 2023-01-06 PROCEDURE — 2580000003 HC RX 258: Performed by: INTERNAL MEDICINE

## 2023-01-06 PROCEDURE — 6370000000 HC RX 637 (ALT 250 FOR IP): Performed by: INTERNAL MEDICINE

## 2023-01-06 PROCEDURE — 1200000000 HC SEMI PRIVATE

## 2023-01-06 PROCEDURE — 94640 AIRWAY INHALATION TREATMENT: CPT

## 2023-01-06 PROCEDURE — 82962 GLUCOSE BLOOD TEST: CPT

## 2023-01-06 RX ORDER — BUDESONIDE AND FORMOTEROL FUMARATE DIHYDRATE 160; 4.5 UG/1; UG/1
2 AEROSOL RESPIRATORY (INHALATION) 2 TIMES DAILY
Status: DISCONTINUED | OUTPATIENT
Start: 2023-01-06 | End: 2023-01-07 | Stop reason: HOSPADM

## 2023-01-06 RX ORDER — ALBUTEROL SULFATE 90 UG/1
2 AEROSOL, METERED RESPIRATORY (INHALATION) EVERY 4 HOURS PRN
Status: DISCONTINUED | OUTPATIENT
Start: 2023-01-06 | End: 2023-01-07 | Stop reason: HOSPADM

## 2023-01-06 RX ORDER — PREDNISONE 20 MG/1
20 TABLET ORAL 2 TIMES DAILY
Status: DISCONTINUED | OUTPATIENT
Start: 2023-01-06 | End: 2023-01-07 | Stop reason: HOSPADM

## 2023-01-06 RX ADMIN — ALLOPURINOL 200 MG: 100 TABLET ORAL at 09:24

## 2023-01-06 RX ADMIN — IPRATROPIUM BROMIDE AND ALBUTEROL 1 PUFF: 20; 100 SPRAY, METERED RESPIRATORY (INHALATION) at 21:36

## 2023-01-06 RX ADMIN — INSULIN LISPRO 4 UNITS: 100 INJECTION, SOLUTION INTRAVENOUS; SUBCUTANEOUS at 07:49

## 2023-01-06 RX ADMIN — MONTELUKAST 10 MG: 10 TABLET, FILM COATED ORAL at 21:22

## 2023-01-06 RX ADMIN — CYCLOBENZAPRINE HYDROCHLORIDE 10 MG: 5 TABLET, FILM COATED ORAL at 21:22

## 2023-01-06 RX ADMIN — CYCLOBENZAPRINE HYDROCHLORIDE 10 MG: 5 TABLET, FILM COATED ORAL at 09:24

## 2023-01-06 RX ADMIN — BUDESONIDE AND FORMOTEROL FUMARATE DIHYDRATE 2 PUFF: 160; 4.5 AEROSOL RESPIRATORY (INHALATION) at 18:06

## 2023-01-06 RX ADMIN — HYDROCHLOROTHIAZIDE 12.5 MG: 12.5 TABLET ORAL at 09:24

## 2023-01-06 RX ADMIN — IPRATROPIUM BROMIDE AND ALBUTEROL SULFATE 1 AMPULE: .5; 2.5 SOLUTION RESPIRATORY (INHALATION) at 06:04

## 2023-01-06 RX ADMIN — ENOXAPARIN SODIUM 30 MG: 100 INJECTION SUBCUTANEOUS at 21:23

## 2023-01-06 RX ADMIN — INSULIN LISPRO 6 UNITS: 100 INJECTION, SOLUTION INTRAVENOUS; SUBCUTANEOUS at 16:22

## 2023-01-06 RX ADMIN — ENOXAPARIN SODIUM 30 MG: 100 INJECTION SUBCUTANEOUS at 09:24

## 2023-01-06 RX ADMIN — METHYLPREDNISOLONE SODIUM SUCCINATE 40 MG: 40 INJECTION, POWDER, FOR SOLUTION INTRAMUSCULAR; INTRAVENOUS at 00:34

## 2023-01-06 RX ADMIN — DICLOFENAC SODIUM 2 G: 10 GEL TOPICAL at 21:00

## 2023-01-06 RX ADMIN — METOPROLOL TARTRATE 25 MG: 25 TABLET, FILM COATED ORAL at 09:36

## 2023-01-06 RX ADMIN — METHYLPREDNISOLONE SODIUM SUCCINATE 40 MG: 40 INJECTION, POWDER, FOR SOLUTION INTRAMUSCULAR; INTRAVENOUS at 09:24

## 2023-01-06 RX ADMIN — LEVOTHYROXINE SODIUM 50 MCG: 0.05 TABLET ORAL at 05:36

## 2023-01-06 RX ADMIN — BUDESONIDE 500 MCG: 0.5 SUSPENSION RESPIRATORY (INHALATION) at 06:04

## 2023-01-06 RX ADMIN — PREDNISONE 20 MG: 20 TABLET ORAL at 16:26

## 2023-01-06 RX ADMIN — OXYCODONE AND ACETAMINOPHEN 1 TABLET: 5; 325 TABLET ORAL at 02:55

## 2023-01-06 RX ADMIN — CYCLOBENZAPRINE HYDROCHLORIDE 10 MG: 5 TABLET, FILM COATED ORAL at 13:59

## 2023-01-06 RX ADMIN — ARFORMOTEROL TARTRATE 15 MCG: 15 SOLUTION RESPIRATORY (INHALATION) at 06:04

## 2023-01-06 RX ADMIN — METOPROLOL TARTRATE 25 MG: 25 TABLET, FILM COATED ORAL at 21:22

## 2023-01-06 RX ADMIN — PANTOPRAZOLE SODIUM 40 MG: 40 TABLET, DELAYED RELEASE ORAL at 21:22

## 2023-01-06 RX ADMIN — IPRATROPIUM BROMIDE AND ALBUTEROL SULFATE 1 AMPULE: .5; 2.5 SOLUTION RESPIRATORY (INHALATION) at 09:30

## 2023-01-06 RX ADMIN — OXYCODONE AND ACETAMINOPHEN 1 TABLET: 5; 325 TABLET ORAL at 07:18

## 2023-01-06 RX ADMIN — Medication 10 ML: at 21:00

## 2023-01-06 RX ADMIN — PANTOPRAZOLE SODIUM 40 MG: 40 TABLET, DELAYED RELEASE ORAL at 09:36

## 2023-01-06 RX ADMIN — IPRATROPIUM BROMIDE AND ALBUTEROL 1 PUFF: 20; 100 SPRAY, METERED RESPIRATORY (INHALATION) at 18:06

## 2023-01-06 ASSESSMENT — PAIN DESCRIPTION - ORIENTATION: ORIENTATION: LOWER

## 2023-01-06 ASSESSMENT — PAIN DESCRIPTION - LOCATION
LOCATION: BACK
LOCATION: BACK

## 2023-01-06 ASSESSMENT — PAIN SCALES - GENERAL
PAINLEVEL_OUTOF10: 7
PAINLEVEL_OUTOF10: 7
PAINLEVEL_OUTOF10: 5
PAINLEVEL_OUTOF10: 7
PAINLEVEL_OUTOF10: 4

## 2023-01-06 ASSESSMENT — PAIN DESCRIPTION - DESCRIPTORS: DESCRIPTORS: ACHING

## 2023-01-06 NOTE — CARE COORDINATION
CM note: transition of care planning, was following for possible home oxygen, however, patient was ambulated by nurse yesterday and maintained pulse ox >88%, does not qualify for home oxygen.

## 2023-01-06 NOTE — PROGRESS NOTES
Department of Internal Medicine      PCP: Yuliana Casas DO  Admitting Physician: Dr. Cora Nagy  Consultants:   Date of Service: 12/29/2022    CHIEF COMPLAINT:  sob    HISTORY OF PRESENT ILLNESS:    Patient is 49-year-old female who presented to the ED due to shortness of breath. patient admits to associated cough but denies any sputum production. She does admit to subjective fever and chills. She does have history of sleep apnea but does not use CPAP.    12/30/2022  Patient seen examined on monitored bed. Patient states he feels little bit better today. Patient denies any chest or abdominal pain. Patient still has shortness of breath with activity. BUN/creatinine 12/0.7 with normal liver enzymes. WBC 5.9 hemoglobin 9.9. Temperature is 97.8 with heart rate 82 blood pressure 153/93. O2 sat high percent on 2 L.    12/31/2022  Patient seen examined on monitored bed. Patient still feels short of breath with activity. Patient overall feels better. BUN/creatinine was 18/0.8 with normal electrolytes. Liver enzymes normal with WBC 8.1 hemoglobin 9.7. Temperature is 98.2 with heart rate 89 blood pressure 150/70. O2 sat 98% on 2 L nasal cannula. Lung auscultation does not show any expiratory wheezing now but just has some decreased coarse breath sounds. 1/1/2023  Patient seen examined on medical surgical floor. Patient states she had shortness of breath early in a.m. about 2 hours after she had her aerosol treatment. Patient states when she gets aerosol treatment makes her feel much better. BUN/creatinine 27/0.9 normal electrolytes. Blood sugars range 164-204. Liver enzymes normal with WBC 7.3 and hemoglobin 9.6. Temperature 98.5 heart rate 75 blood pressure 152/72. O2 sat 98% on 2 L nasal cannula and is 91% on room air at rest.  Because of the persistent shortness of breath which is relieved with aerosols we will add Brovana aerosol twice daily.     1/2/2023  Patient seen examined on medical surgical floor. Patient states she is feeling better today. Currently patient is on room air and ambulating to the bathroom with minimal difficulty. BUN/creatinine 30/1.9 with normal electrolytes. Blood sugars range 136-182. Liver enzymes normal with a WBC 7.5 and hemoglobin 10.2. Platelet count was 302. Temperature is 97.9 with heart rate of 74 blood pressure 155/85. O2 sat 98% on 2 L nasal cannula. Patient is to have O2 saturations on room air with activity today. 1/3/2023  Patient seen examined on medical surgical floor. Patient complaining of some chronic low back pain. Patient overall feels better but still short of breath with activity. She denies any chest or abdominal pain. BUN/creatinine 27/0.8 with normal electrolytes. Blood sugars range 174-253. Liver enzymes normal with WBC 7.2 and hemoglobin 10.3. Temperature is 98.1 with heart rate of 60 and blood pressure 158/87. O2 sat 95% on 2 L nasal cannula. Patient was ambulated in the hallway early yesterday afternoon with O2 sat at rest was 92% but with activity the O2 sat went down to 84%. We will add heating pad to low back along with change in Flexeril 10 mg 3 times daily. 1/4/2023  Patient seen examined on medical surgical floor. Patient complains of low back pain again today. Patient states she feels better again today but still says she is not back to her baseline. Patient denies any productive cough. There is no fever/chills, nausea/vomiting, chest pain or abdominal pain. Blood sugars ranging 198-250. Vitamin B12 level was within normal limits. Temperature is 98.4 with heart rate of 62 blood pressure ranges 153//92. O2 sat 98% on 3 L. Since the patient still is not back to baseline we will do a CT of the lungs today for further monitoring to see if patient has any underlying infiltrates. 1/6/2023  Patient seen examined on medical surgical floor. Patient feels better again today.   Nursing ambulated the patient in the hallway yesterday with O2 saturation about 92% with activity and 96% at rest.  Patient according to the nurse was very symptomatic and feeling short of breath with activity. Blood sugars range 195-281. Temperature 98.2 with heart rate ranging  with a blood pressure ranges 140//91. Since patient is definitely improved we will take off the IV steroids and start prednisone this afternoon and also take her off her aerosols and start inhalers to make sure the patient can tolerate this at home. Patient states she does not have any nebulizer at home and only has a rescue inhaler at home. If the patient is stable tomorrow we will discharge home on the oral steroids and inhalers. PAST MEDICAL Hx:  Past Medical History:   Diagnosis Date    Arthritis     Asthma     DM (diabetes mellitus) ([address]     HTN (hypertension)     Morbid obesity due to excess calories ([address]     Obstructive sleep apnea syndrome     Pulmonary hypertension ([facility]     Retention of urine        PAST SURGICAL Hx:   Past Surgical History:   Procedure Laterality Date    APPENDECTOMY  1985    CATARACT REMOVAL Bilateral 2017    [address] (CERVIX STATUS UNKNOWN)  2017    JOINT REPLACEMENT Left 2009    knee    NERVE BLOCK N/A 12/03/2014    cervical #1    NERVE BLOCK N/A 12/10/2014    cervical epidural #2    NERVE BLOCK  12/17/2014    cerical epidural #3    UPPER GASTROINTESTINAL ENDOSCOPY N/A 10/17/2022    EGD BIOPSY performed by Phoebe Maher MD at [address] Hx:  Family History   Problem Relation Age of Onset    Cancer Father     Diabetes Mother     Hypertension Mother        HOME MEDICATIONS:  Prior to Admission medications    Medication Sig Start Date End Date Taking? Authorizing Provider   lidocaine (LIDODERM) 5 % Place 1 patch onto the skin daily 12 hours on, 12 hours off.     Historical Provider, MD   pantoprazole (PROTONIX) 40 MG tablet Take 40 mg by mouth 2 times daily    Historical Provider, MD   levothyroxine (SYNTHROID) 25 MCG tablet Take 50 mcg by mouth Daily    Historical Provider, MD   fluticasone 200 MCG/ACT AEPB Inhale 1 puff into the lungs daily    Historical Provider, MD   albuterol sulfate  (90 Base) MCG/ACT inhaler Inhale 2 puffs into the lungs as needed for Wheezing    Historical Provider, MD   metFORMIN (GLUCOPHAGE) 500 MG tablet Take 500 mg by mouth 2 times daily (with meals)    Historical Provider, MD   allopurinol (ZYLOPRIM) 100 MG tablet Take 200 mg by mouth daily    Historical Provider, MD   hydrochlorothiazide (MICROZIDE) 12.5 MG capsule Take 12.5 mg by mouth daily. Historical Provider, MD   oxyCODONE-acetaminophen (PERCOCET) 5-325 MG per tablet Take 1 tablet by mouth every 6 hours as needed for Pain. Historical Provider, MD   montelukast (SINGULAIR) 10 MG tablet Take 10 mg by mouth nightly. Historical Provider, MD   metoprolol (LOPRESSOR) 25 MG tablet Take 25 mg by mouth 2 times daily. Historical Provider, MD   cyclobenzaprine (FLEXERIL) 10 MG tablet Take 10 mg by mouth 3 times daily as needed for Muscle spasms    Historical Provider, MD   diclofenac sodium (VOLTAREN) 1 % GEL Apply 2 g topically 2 times daily.     Historical Provider, MD       ALLERGIES:  [place] [aspirin], Ibuprofen, Norco [hydrocodone-acetaminophen], and Ultram [tramadol]    SOCIAL Hx:  Social History     Socioeconomic History    Marital status: Single     Spouse name: Not on file    Number of children: Not on file    Years of education: Not on file    Highest education level: Not on file   Occupational History    Not on file   Tobacco Use    Smoking status: Never    Smokeless tobacco: Never   Vaping Use    Vaping Use: Unknown   Substance and Sexual Activity    Alcohol use: No    Drug use: No    Sexual activity: Not on file   Other Topics Concern    Not on file   Social History Narrative    Not on file     Social Determinants of Health     Financial Resource Strain: Not on file   Food Insecurity: Not on file   Transportation Needs: Not on file   Physical Activity: Not on file   Stress: Not on file   Social Connections: Not on file   Intimate Partner Violence: Not on file   Housing Stability: Not on file       ROS: Positive in bold  General:   Denies chills, fatigue, fever, malaise, night sweats or weight loss    Psychological:   Denies anxiety, disorientation or hallucinations    ENT:    Denies epistaxis, headaches, vertigo or visual changes    Cardiovascular:   Denies any chest pain, irregular heartbeats, or palpitations. No paroxysmal nocturnal dyspnea. Respiratory:   Denies shortness of breath, coughing, sputum production, hemoptysis, or wheezing. No orthopnea. Gastrointestinal:   Denies nausea, vomiting, diarrhea, or constipation. Denies any abdominal pain. Denies change in bowel habits or stools. Genito-Urinary:    Denies any urgency, frequency, hematuria. Voiding without difficulty. Musculoskeletal:   Denies joint pain, joint stiffness, joint swelling or muscle pain    Neurology:    Denies any headache or focal neurological deficits. No weakness or paresthesia. Derm:    Denies any rashes, ulcers, or excoriations. Denies bruising. Extremities:   Denies any lower extremity swelling or edema. PHYSICAL EXAM: Abnormal findings noted  VITALS:  Vitals:    01/06/23 0936   BP:    Pulse: 100   Resp:    Temp:    SpO2:          CONSTITUTIONAL:    Awake, alert, cooperative, no apparent distress, and appears stated age    EYES:     EOMI, sclera clear, conjunctiva normal    ENT:    Normocephalic, atraumatic,  External ears without lesions. NECK:    Supple, symmetrical, trachea midline, no JVD    HEMATOLOGIC/LYMPHATICS:    No cervical lymphadenopathy and no supraclavicular lymphadenopathy    LUNGS:    Symmetric.  No increased work of breathing, good air exchange, clear to auscultation bilaterally, no wheezes, rhonchi, or rales,   Patient has diminished breath sounds bilaterally    CARDIOVASCULAR:    Normal apical impulse, regular rate and rhythm, normal S1 and S2, no S3 or S4, and no murmur noted    ABDOMEN:    soft, non-distended, non-tender    MUSCULOSKELETAL:    There is no redness, warmth, or swelling of the joints. NEUROLOGIC:    Awake, alert, oriented to name, place and time. SKIN:    No bruising or bleeding. No redness, warmth, or swelling    EXTREMITIES:    Peripheral pulses present. No edema, cyanosis, or swelling.     LINES/CATHETERS     LABORATORY DATA:  CBC with Differential:    Lab Results   Component Value Date/Time    WBC 7.8 01/04/2023 10:01 AM    RBC 4.51 01/04/2023 10:01 AM    HGB 10.5 01/05/2023 04:34 AM    HCT 34.9 01/05/2023 04:34 AM     01/04/2023 10:01 AM    MCV 80.5 01/04/2023 10:01 AM    MCH 23.7 01/04/2023 10:01 AM    MCHC 29.5 01/04/2023 10:01 AM    RDW 14.7 01/04/2023 10:01 AM    METASPCT 0.9 01/03/2023 10:10 AM    LYMPHOPCT 5.0 01/04/2023 10:01 AM    MONOPCT 3.6 01/04/2023 10:01 AM    MYELOPCT 0.9 01/03/2023 10:10 AM    BASOPCT 0.1 01/04/2023 10:01 AM    MONOSABS 0.28 01/04/2023 10:01 AM    LYMPHSABS 0.39 01/04/2023 10:01 AM    EOSABS 0.00 01/04/2023 10:01 AM    BASOSABS 0.01 01/04/2023 10:01 AM     CMP:    Lab Results   Component Value Date/Time     01/04/2023 10:01 AM    K 4.8 01/04/2023 10:01 AM    CL 98 01/04/2023 10:01 AM    CO2 28 01/04/2023 10:01 AM    BUN 25 01/04/2023 10:01 AM    CREATININE 0.9 01/04/2023 10:01 AM    GFRAA >60 10/17/2022 11:38 AM    LABGLOM >60 01/04/2023 10:01 AM    GLUCOSE 218 01/04/2023 10:01 AM    PROT 6.3 01/04/2023 10:01 AM    LABALBU 3.8 01/04/2023 10:01 AM    CALCIUM 8.1 01/04/2023 10:01 AM    BILITOT 0.3 01/04/2023 10:01 AM    ALKPHOS 84 01/04/2023 10:01 AM    AST 15 01/04/2023 10:01 AM    ALT 17 01/04/2023 10:01 AM       ASSESSMENT/PLAN:  Acute COPD exacerbation   Acute on chronic normocytic anemia  Acute hypoxic respiratory failure  Sleep apnea without use of CPAP  Mild pulmonary hypertension  Mild tricuspid regurgitation  Non-insulin-dependent diabetes mellitus  Hypertension  Morbid obesity  Acute on chronic low back pain from degenerative disc disease    Patient presented with shortness of breath. Patient found to be in acute COPD exacerbation. Patient placed on nebulized breathing treatments and IV steroids. Viral respiratory panel ordered. Continue to monitor pulse ox. Home medication reviewed    Proventil aerosol twice daily  Pulmicort aerosol twice daily  DuoNeb aerosols 4 times daily and every 4 as needed  Singular 10 mg daily    O2 saturation on room air at rest and if greater than 89% with activity and monitor heart rate    H67, folic acid level-normal    Heating pad to low back  Flexeril 10 mg 3 times daily    Stop Solu-Medrol  Prednisone 20 mg twice daily started 5 PM today  Stop Brovana/Pulmicort, DuoNeb aerosol  Symbicort inhaler 160/4.5-2 puffs twice daily started this afternoon  Start Combivent Respimat 1 puff 4 times daily  Proventil aerosol every 4 hours.     Demerol 50 mg IM x1    Wnedi Flores DO  10:55 AM  1/6/2023

## 2023-01-06 NOTE — PLAN OF CARE
Problem: Pain  Goal: Verbalizes/displays adequate comfort level or baseline comfort level  1/5/2023 2258 by Luis Pandya RN  Outcome: Progressing  1/5/2023 1316 by Eladia Mtz RN  Outcome: Progressing     Problem: Chronic Conditions and Co-morbidities  Goal: Patient's chronic conditions and co-morbidity symptoms are monitored and maintained or improved  1/5/2023 2258 by Luis Pandya RN  Outcome: Progressing  1/5/2023 1316 by Eladia Mtz RN  Outcome: Progressing     Problem: Safety - Adult  Goal: Free from fall injury  1/5/2023 2258 by Luis Pandya RN  Outcome: Progressing  1/5/2023 1316 by Eladia Mtz RN  Outcome: Progressing     Problem: Discharge Planning  Goal: Discharge to home or other facility with appropriate resources  1/5/2023 2258 by Luis Pandya RN  Outcome: Progressing  1/5/2023 1316 by Eladia Mtz RN  Outcome: Progressing     Problem: Metabolic/Fluid and Electrolytes - Adult  Goal: Glucose maintained within prescribed range  1/5/2023 2258 by Luis Pandya RN  Outcome: Progressing  1/5/2023 1316 by Eladia Mtz RN  Outcome: Progressing

## 2023-01-07 VITALS
HEIGHT: 62 IN | OXYGEN SATURATION: 96 % | HEART RATE: 67 BPM | SYSTOLIC BLOOD PRESSURE: 138 MMHG | WEIGHT: 293 LBS | RESPIRATION RATE: 16 BRPM | DIASTOLIC BLOOD PRESSURE: 95 MMHG | BODY MASS INDEX: 53.92 KG/M2 | TEMPERATURE: 97.7 F

## 2023-01-07 LAB
METER GLUCOSE: 140 MG/DL (ref 74–99)
METER GLUCOSE: 306 MG/DL (ref 74–99)

## 2023-01-07 PROCEDURE — 6370000000 HC RX 637 (ALT 250 FOR IP): Performed by: INTERNAL MEDICINE

## 2023-01-07 PROCEDURE — 2700000000 HC OXYGEN THERAPY PER DAY

## 2023-01-07 PROCEDURE — 6360000002 HC RX W HCPCS: Performed by: INTERNAL MEDICINE

## 2023-01-07 PROCEDURE — 94640 AIRWAY INHALATION TREATMENT: CPT

## 2023-01-07 PROCEDURE — 82962 GLUCOSE BLOOD TEST: CPT

## 2023-01-07 RX ORDER — POLYETHYLENE GLYCOL 3350 17 G/17G
17 POWDER, FOR SOLUTION ORAL DAILY PRN
Qty: 527 G | Refills: 1 | Status: SHIPPED | OUTPATIENT
Start: 2023-01-07 | End: 2023-02-06

## 2023-01-07 RX ORDER — BUDESONIDE AND FORMOTEROL FUMARATE DIHYDRATE 160; 4.5 UG/1; UG/1
2 AEROSOL RESPIRATORY (INHALATION) 2 TIMES DAILY
Qty: 10.2 G | Refills: 3 | Status: SHIPPED | OUTPATIENT
Start: 2023-01-07

## 2023-01-07 RX ORDER — PREDNISONE 20 MG/1
20 TABLET ORAL DAILY
Qty: 4 TABLET | Refills: 0 | Status: SHIPPED | OUTPATIENT
Start: 2023-01-08 | End: 2023-01-12

## 2023-01-07 RX ORDER — ALBUTEROL SULFATE 2.5 MG/3ML
2.5 SOLUTION RESPIRATORY (INHALATION) EVERY 4 HOURS PRN
Qty: 120 EACH | Refills: 3 | Status: SHIPPED | OUTPATIENT
Start: 2023-01-07

## 2023-01-07 RX ORDER — ALBUTEROL SULFATE 2.5 MG/3ML
2.5 SOLUTION RESPIRATORY (INHALATION) EVERY 4 HOURS PRN
Status: DISCONTINUED | OUTPATIENT
Start: 2023-01-07 | End: 2023-01-07 | Stop reason: HOSPADM

## 2023-01-07 RX ADMIN — DICLOFENAC SODIUM 2 G: 10 GEL TOPICAL at 08:50

## 2023-01-07 RX ADMIN — IPRATROPIUM BROMIDE AND ALBUTEROL 1 PUFF: 20; 100 SPRAY, METERED RESPIRATORY (INHALATION) at 06:13

## 2023-01-07 RX ADMIN — PREDNISONE 20 MG: 20 TABLET ORAL at 08:43

## 2023-01-07 RX ADMIN — CYCLOBENZAPRINE HYDROCHLORIDE 10 MG: 5 TABLET, FILM COATED ORAL at 08:43

## 2023-01-07 RX ADMIN — INSULIN LISPRO 6 UNITS: 100 INJECTION, SOLUTION INTRAVENOUS; SUBCUTANEOUS at 08:47

## 2023-01-07 RX ADMIN — ALLOPURINOL 200 MG: 100 TABLET ORAL at 08:43

## 2023-01-07 RX ADMIN — OXYCODONE AND ACETAMINOPHEN 1 TABLET: 5; 325 TABLET ORAL at 00:34

## 2023-01-07 RX ADMIN — IPRATROPIUM BROMIDE AND ALBUTEROL 1 PUFF: 20; 100 SPRAY, METERED RESPIRATORY (INHALATION) at 09:37

## 2023-01-07 RX ADMIN — OXYCODONE AND ACETAMINOPHEN 1 TABLET: 5; 325 TABLET ORAL at 05:51

## 2023-01-07 RX ADMIN — HYDROCHLOROTHIAZIDE 12.5 MG: 12.5 TABLET ORAL at 08:43

## 2023-01-07 RX ADMIN — METOPROLOL TARTRATE 25 MG: 25 TABLET, FILM COATED ORAL at 08:43

## 2023-01-07 RX ADMIN — BUDESONIDE AND FORMOTEROL FUMARATE DIHYDRATE 2 PUFF: 160; 4.5 AEROSOL RESPIRATORY (INHALATION) at 06:13

## 2023-01-07 RX ADMIN — LEVOTHYROXINE SODIUM 50 MCG: 0.05 TABLET ORAL at 05:56

## 2023-01-07 RX ADMIN — ENOXAPARIN SODIUM 30 MG: 100 INJECTION SUBCUTANEOUS at 08:44

## 2023-01-07 RX ADMIN — PANTOPRAZOLE SODIUM 40 MG: 40 TABLET, DELAYED RELEASE ORAL at 08:43

## 2023-01-07 ASSESSMENT — PAIN DESCRIPTION - DESCRIPTORS: DESCRIPTORS: DISCOMFORT

## 2023-01-07 ASSESSMENT — PAIN DESCRIPTION - LOCATION
LOCATION: BACK
LOCATION: BACK

## 2023-01-07 ASSESSMENT — PAIN SCALES - GENERAL
PAINLEVEL_OUTOF10: 5
PAINLEVEL_OUTOF10: 3
PAINLEVEL_OUTOF10: 7
PAINLEVEL_OUTOF10: 7

## 2023-01-07 ASSESSMENT — PAIN DESCRIPTION - PAIN TYPE: TYPE: CHRONIC PAIN

## 2023-01-07 NOTE — DISCHARGE INSTRUCTIONS
Your information:  Name: Agnes West  : 1960    Your instructions:  Discharge Home    What to do after you leave the hospital:    Recommended diet: regular diet    Recommended activity: activity as tolerated    The following personal items were collected during your admission and were returned to you:    Belongings  Dental Appliances: None  Vision - Corrective Lenses: None  Hearing Aid: None  Clothing: Footwear, Jacket/Coat, Pants, Shirt  Jewelry: None  Body Piercings Removed: N/A  Electronic Devices: Cell Phone,   Weapons (Notify Protective Services/Security): None  Home Medications: None  Valuables Given To: Patient  Provide Name(s) of Who Valuable(s) Were Given To: Patient  Patient approves for provider to speak to responsible person post operatively: Yes    Information obtained by:  By signing below, I understand that if any problems occur once I leave the hospital I am to contact PCP. I understand and acknowledge receipt of the instructions indicated above. Chronic Obstructive Pulmonary Disease (COPD) Flare-Ups: Care Instructions  Overview     Chronic obstructive pulmonary disease (COPD) is a lung disease that makes it hard to breathe. It is caused by damage to the lungs over many years, usually from smoking. Chronic bronchitis and emphysema are two lung problems that are types of COPD:  Chronic bronchitis: The airways that carry air to the lungs (bronchial tubes) get inflamed and make a lot of mucus. This can narrow or block the airways. It can also make you cough. Emphysema: The tiny air sacs (alveoli) at the end of the airways in the lungs are damaged. When the air sacs are damaged or destroyed, the inner walls break down, and the sacs become larger. These larger air sacs move less oxygen into the blood. This causes difficulty breathing or shortness of breath that gets worse over time. After air sacs are destroyed, they cannot be replaced.   Many people with COPD have attacks called flare-ups or exacerbations. This is when your usual symptoms quickly get worse and stay worse. If you notice any problems or new symptoms, get medical treatment right away. Follow-up care is a key part of your treatment and safety. Be sure to make and go to all appointments, and call your doctor if you are having problems. It's also a good idea to know your test results and keep a list of the medicines you take. How can you care for yourself at home? Be safe with medicines. Take your medicines exactly as prescribed. Call your doctor if you think you are having a problem with your medicine. You may be taking medicines such as:  Bronchodilators. These help open your airways and make breathing easier. Corticosteroids. These reduce airway inflammation. They may be given as pills, in a vein, or in an inhaled form. You may go home with pills in addition to an inhaler that you already use. A spacer may help you get more inhaled medicine to your lungs. Ask your doctor or pharmacist if a spacer is right for you. If it is, ask how to use it properly. If your doctor prescribed antibiotics, take them as directed. Do not stop taking them just because you feel better. You need to take the full course of antibiotics. If your doctor prescribed oxygen, use the flow rate your doctor has recommended. Do not change it without talking to your doctor first.  Shankar Nickerson not smoke. Smoking makes COPD worse. If you need help quitting, talk to your doctor about stop-smoking programs and medicines. These can increase your chances of quitting for good. When should you call for help? Call 911 anytime you think you may need emergency care. For example, call if:    You have severe trouble breathing. You have severe chest pain, or chest pain is quickly getting worse. Call your doctor now or seek immediate medical care if:    You have new or worse trouble breathing. Your coughing or wheezing gets worse.      You cough up dark brown or bloody mucus (sputum). You have a new or higher fever. Watch closely for changes in your health, and be sure to contact your doctor if:    You notice more mucus or a change in the color of your mucus. You need to use your antibiotic or steroid pills. You do not get better as expected. Where can you learn more? Go to http://www.woods.com/ and enter D989 to learn more about \"Chronic Obstructive Pulmonary Disease (COPD) Flare-Ups: Care Instructions. \"  Current as of: March 9, 2022               Content Version: 13.5  © 0349-6109 [employer]. Care instructions adapted under license by [facility] [place] ([facility]). If you have questions about a medical condition or this instruction, always ask your healthcare professional. [address] any warranty or liability for your use of this information.

## 2023-01-07 NOTE — DISCHARGE SUMMARY
Department of Internal Medicine      PCP: Eliane White DO  Admitting Physician: Dr. Mode León  Consultants:   Date of Service: 12/29/2022    CHIEF COMPLAINT:  sob    HISTORY OF PRESENT ILLNESS:    Patient is 53-year-old female who presented to the ED due to shortness of breath. patient admits to associated cough but denies any sputum production. She does admit to subjective fever and chills. She does have history of sleep apnea but does not use CPAP.    12/30/2022  Patient seen examined on monitored bed. Patient states he feels little bit better today. Patient denies any chest or abdominal pain. Patient still has shortness of breath with activity. BUN/creatinine 12/0.7 with normal liver enzymes. WBC 5.9 hemoglobin 9.9. Temperature is 97.8 with heart rate 82 blood pressure 153/93. O2 sat high percent on 2 L.    12/31/2022  Patient seen examined on monitored bed. Patient still feels short of breath with activity. Patient overall feels better. BUN/creatinine was 18/0.8 with normal electrolytes. Liver enzymes normal with WBC 8.1 hemoglobin 9.7. Temperature is 98.2 with heart rate 89 blood pressure 150/70. O2 sat 98% on 2 L nasal cannula. Lung auscultation does not show any expiratory wheezing now but just has some decreased coarse breath sounds. 1/1/2023  Patient seen examined on medical surgical floor. Patient states she had shortness of breath early in a.m. about 2 hours after she had her aerosol treatment. Patient states when she gets aerosol treatment makes her feel much better. BUN/creatinine 27/0.9 normal electrolytes. Blood sugars range 164-204. Liver enzymes normal with WBC 7.3 and hemoglobin 9.6. Temperature 98.5 heart rate 75 blood pressure 152/72. O2 sat 98% on 2 L nasal cannula and is 91% on room air at rest.  Because of the persistent shortness of breath which is relieved with aerosols we will add Brovana aerosol twice daily.     1/2/2023  Patient seen examined on medical surgical floor. Patient states she is feeling better today. Currently patient is on room air and ambulating to the bathroom with minimal difficulty. BUN/creatinine 30/1.9 with normal electrolytes. Blood sugars range 136-182. Liver enzymes normal with a WBC 7.5 and hemoglobin 10.2. Platelet count was 409. Temperature is 97.9 with heart rate of 74 blood pressure 155/85. O2 sat 98% on 2 L nasal cannula. Patient is to have O2 saturations on room air with activity today. 1/3/2023  Patient seen examined on medical surgical floor. Patient complaining of some chronic low back pain. Patient overall feels better but still short of breath with activity. She denies any chest or abdominal pain. BUN/creatinine 27/0.8 with normal electrolytes. Blood sugars range 174-253. Liver enzymes normal with WBC 7.2 and hemoglobin 10.3. Temperature is 98.1 with heart rate of 60 and blood pressure 158/87. O2 sat 95% on 2 L nasal cannula. Patient was ambulated in the hallway early yesterday afternoon with O2 sat at rest was 92% but with activity the O2 sat went down to 84%. We will add heating pad to low back along with change in Flexeril 10 mg 3 times daily. 1/4/2023  Patient seen examined on medical surgical floor. Patient complains of low back pain again today. Patient states she feels better again today but still says she is not back to her baseline. Patient denies any productive cough. There is no fever/chills, nausea/vomiting, chest pain or abdominal pain. Blood sugars ranging 198-250. Vitamin B12 level was within normal limits. Temperature is 98.4 with heart rate of 62 blood pressure ranges 153//92. O2 sat 98% on 3 L. Since the patient still is not back to baseline we will do a CT of the lungs today for further monitoring to see if patient has any underlying infiltrates. 1/6/2023  Patient seen examined on medical surgical floor. Patient feels better again today.   Nursing ambulated the patient in the hallway yesterday with O2 saturation about 92% with activity and 96% at rest.  Patient according to the nurse was very symptomatic and feeling short of breath with activity. Blood sugars range 195-281. Temperature 98.2 with heart rate ranging  with a blood pressure ranges 140//91. Since patient is definitely improved we will take off the IV steroids and start prednisone this afternoon and also take her off her aerosols and start inhalers to make sure the patient can tolerate this at home. Patient states she does not have any nebulizer at home and only has a rescue inhaler at home. If the patient is stable tomorrow we will discharge home on the oral steroids and inhalers. 1/7/2023  Patient seen examined on general medical floor. Patient states she feels really good today. Patient denies any unusual shortness of breath at rest with minimal dyspnea with activity. Patient denies any chest or abdominal pain. There is no dizziness or nausea/vomiting. Blood sugars range 140-306. Temperature 97.7 with heart rate 67 blood pressure 138/95. O2 sat 96% on room air at rest.  Patient stable for discharge.     PAST MEDICAL Hx:  Past Medical History:   Diagnosis Date    Arthritis     Asthma     DM (diabetes mellitus) ([address]     HTN (hypertension)     Morbid obesity due to excess calories ([address]     Obstructive sleep apnea syndrome     Pulmonary hypertension ([address]     Retention of urine        PAST SURGICAL Hx:   Past Surgical History:   Procedure Laterality Date    APPENDECTOMY  1985    CATARACT REMOVAL Bilateral 2017    [address] (CERVIX STATUS UNKNOWN)  2017    JOINT REPLACEMENT Left 2009    knee    NERVE BLOCK N/A 12/03/2014    cervical #1    NERVE BLOCK N/A 12/10/2014    cervical epidural #2    NERVE BLOCK  12/17/2014    cerical epidural #3    UPPER GASTROINTESTINAL ENDOSCOPY N/A 10/17/2022    EGD BIOPSY performed by Alexus Rose MD at [facility] ENDOSCOPY       FAMILY Hx:  Family History   Problem Relation Age of Onset    Cancer Father     Diabetes Mother     Hypertension Mother        HOME MEDICATIONS:  Prior to Admission medications    Medication Sig Start Date End Date Taking? Authorizing Provider   lidocaine (LIDODERM) 5 % Place 1 patch onto the skin daily 12 hours on, 12 hours off. Historical Provider, MD   pantoprazole (PROTONIX) 40 MG tablet Take 40 mg by mouth 2 times daily    Historical Provider, MD   levothyroxine (SYNTHROID) 25 MCG tablet Take 50 mcg by mouth Daily    Historical Provider, MD   fluticasone 200 MCG/ACT AEPB Inhale 1 puff into the lungs daily    Historical Provider, MD   albuterol sulfate  (90 Base) MCG/ACT inhaler Inhale 2 puffs into the lungs as needed for Wheezing    Historical Provider, MD   metFORMIN (GLUCOPHAGE) 500 MG tablet Take 500 mg by mouth 2 times daily (with meals)    Historical Provider, MD   allopurinol (ZYLOPRIM) 100 MG tablet Take 200 mg by mouth daily    Historical Provider, MD   hydrochlorothiazide (MICROZIDE) 12.5 MG capsule Take 12.5 mg by mouth daily. Historical Provider, MD   oxyCODONE-acetaminophen (PERCOCET) 5-325 MG per tablet Take 1 tablet by mouth every 6 hours as needed for Pain. Historical Provider, MD   montelukast (SINGULAIR) 10 MG tablet Take 10 mg by mouth nightly. Historical Provider, MD   metoprolol (LOPRESSOR) 25 MG tablet Take 25 mg by mouth 2 times daily. Historical Provider, MD   cyclobenzaprine (FLEXERIL) 10 MG tablet Take 10 mg by mouth 3 times daily as needed for Muscle spasms    Historical Provider, MD   diclofenac sodium (VOLTAREN) 1 % GEL Apply 2 g topically 2 times daily.     Historical Provider, MD       ALLERGIES:  Terrell Riggs [aspirin], Ibuprofen, Norco [hydrocodone-acetaminophen], and Ultram [tramadol]    SOCIAL Hx:  Social History     Socioeconomic History    Marital status: Single     Spouse name: Not on file    Number of children: Not on file    Years of education: Not on file    Highest education level: Not on file   Occupational History    Not on file   Tobacco Use    Smoking status: Never    Smokeless tobacco: Never   Vaping Use    Vaping Use: Unknown   Substance and Sexual Activity    Alcohol use: No    Drug use: No    Sexual activity: Not on file   Other Topics Concern    Not on file   Social History Narrative    Not on file     Social Determinants of Health     Financial Resource Strain: Not on file   Food Insecurity: Not on file   Transportation Needs: Not on file   Physical Activity: Not on file   Stress: Not on file   Social Connections: Not on file   Intimate Partner Violence: Not on file   Housing Stability: Not on file       ROS: Positive in bold  General:   Denies chills, fatigue, fever, malaise, night sweats or weight loss    Psychological:   Denies anxiety, disorientation or hallucinations    ENT:    Denies epistaxis, headaches, vertigo or visual changes    Cardiovascular:   Denies any chest pain, irregular heartbeats, or palpitations. No paroxysmal nocturnal dyspnea. Respiratory:   Denies shortness of breath, coughing, sputum production, hemoptysis, or wheezing. No orthopnea. Gastrointestinal:   Denies nausea, vomiting, diarrhea, or constipation. Denies any abdominal pain. Denies change in bowel habits or stools. Genito-Urinary:    Denies any urgency, frequency, hematuria. Voiding without difficulty. Musculoskeletal:   Denies joint pain, joint stiffness, joint swelling or muscle pain    Neurology:    Denies any headache or focal neurological deficits. No weakness or paresthesia. Derm:    Denies any rashes, ulcers, or excoriations. Denies bruising. Extremities:   Denies any lower extremity swelling or edema.       PHYSICAL EXAM: Abnormal findings noted  VITALS:  Vitals:    01/07/23 0930   BP:    Pulse:    Resp:    Temp:    SpO2: 96%         CONSTITUTIONAL:    Awake, alert, cooperative, no apparent distress, and appears stated age    EYES:     EOMI, sclera clear, conjunctiva normal    ENT:    Normocephalic, atraumatic,  External ears without lesions. NECK:    Supple, symmetrical, trachea midline, no JVD    HEMATOLOGIC/LYMPHATICS:    No cervical lymphadenopathy and no supraclavicular lymphadenopathy    LUNGS:    Symmetric. No increased work of breathing, good air exchange, clear to auscultation bilaterally, no wheezes, rhonchi, or rales,   Patient has diminished breath sounds bilaterally    CARDIOVASCULAR:    Normal apical impulse, regular rate and rhythm, normal S1 and S2, no S3 or S4, and no murmur noted    ABDOMEN:    soft, non-distended, non-tender    MUSCULOSKELETAL:    There is no redness, warmth, or swelling of the joints. NEUROLOGIC:    Awake, alert, oriented to name, place and time. SKIN:    No bruising or bleeding. No redness, warmth, or swelling    EXTREMITIES:    Peripheral pulses present. No edema, cyanosis, or swelling.     LINES/CATHETERS     LABORATORY DATA:  CBC with Differential:    Lab Results   Component Value Date/Time    WBC 7.8 01/04/2023 10:01 AM    RBC 4.51 01/04/2023 10:01 AM    HGB 10.5 01/05/2023 04:34 AM    HCT 34.9 01/05/2023 04:34 AM     01/04/2023 10:01 AM    MCV 80.5 01/04/2023 10:01 AM    MCH 23.7 01/04/2023 10:01 AM    MCHC 29.5 01/04/2023 10:01 AM    RDW 14.7 01/04/2023 10:01 AM    METASPCT 0.9 01/03/2023 10:10 AM    LYMPHOPCT 5.0 01/04/2023 10:01 AM    MONOPCT 3.6 01/04/2023 10:01 AM    MYELOPCT 0.9 01/03/2023 10:10 AM    BASOPCT 0.1 01/04/2023 10:01 AM    MONOSABS 0.28 01/04/2023 10:01 AM    LYMPHSABS 0.39 01/04/2023 10:01 AM    EOSABS 0.00 01/04/2023 10:01 AM    BASOSABS 0.01 01/04/2023 10:01 AM     CMP:    Lab Results   Component Value Date/Time     01/04/2023 10:01 AM    K 4.8 01/04/2023 10:01 AM    CL 98 01/04/2023 10:01 AM    CO2 28 01/04/2023 10:01 AM    BUN 25 01/04/2023 10:01 AM    CREATININE 0.9 01/04/2023 10:01 AM    GFRAA >60 10/17/2022 11:38 AM    LABGLOM >60 01/04/2023 10:01 AM    GLUCOSE 218 01/04/2023 10:01 AM    PROT 6.3 01/04/2023 10:01 AM    LABALBU 3.8 01/04/2023 10:01 AM    CALCIUM 8.1 01/04/2023 10:01 AM    BILITOT 0.3 01/04/2023 10:01 AM    ALKPHOS 84 01/04/2023 10:01 AM    AST 15 01/04/2023 10:01 AM    ALT 17 01/04/2023 10:01 AM       ASSESSMENT/PLAN:  Acute COPD exacerbation   Acute on chronic normocytic anemia  Acute hypoxic respiratory failure  Sleep apnea without use of CPAP  Mild pulmonary hypertension  Mild tricuspid regurgitation  Non-insulin-dependent diabetes mellitus-hemoglobin A1c 6.2  Hypertension  Morbid obesity  Acute on chronic low back pain from degenerative disc disease    Plan:  Discharge home today    Prescription for Combivent Respimat 1 puff 4 times daily  Prescription for Symbicort inhaler 160/4.5-2 puffs twice daily  Prescription for nebulizer  Prescription for prednisone 20 mg daily x4 days starting tomorrow    Continue home meds    Patient to follow-up with her pulmonologist next week    Patient follow-up with primary care physician in 1 week or as directed    Mary Rivera DO  10:54 AM  1/7/2023

## 2023-01-13 ENCOUNTER — TELEPHONE (OUTPATIENT)
Dept: BARIATRICS/WEIGHT MGMT | Age: 63
End: 2023-01-13

## 2023-01-13 NOTE — TELEPHONE ENCOUNTER
SW called PT regarding cancelled psych eval appointment but PT was unavailable. Left v-mail requesting that the PT call to reschedule the appointment.      DADA Pro

## 2023-01-23 ENCOUNTER — TELEPHONE (OUTPATIENT)
Dept: BARIATRICS/WEIGHT MGMT | Age: 63
End: 2023-01-23

## 2023-01-23 NOTE — TELEPHONE ENCOUNTER
SW called PT regarding psych eval appointment but PT was unavailable. Left v-mail requesting that the PT call to reschedule the appointment.      DADA Rebollar

## 2025-01-02 ENCOUNTER — OFFICE VISIT (OUTPATIENT)
Age: 65
End: 2025-01-02

## 2025-01-02 VITALS
HEART RATE: 80 BPM | RESPIRATION RATE: 18 BRPM | HEIGHT: 62 IN | OXYGEN SATURATION: 94 % | TEMPERATURE: 97.7 F | SYSTOLIC BLOOD PRESSURE: 123 MMHG | WEIGHT: 293 LBS | DIASTOLIC BLOOD PRESSURE: 67 MMHG | BODY MASS INDEX: 53.92 KG/M2

## 2025-01-02 DIAGNOSIS — Z12.4 SCREENING FOR CERVICAL CANCER: Primary | ICD-10-CM

## 2025-01-02 DIAGNOSIS — Z01.419 ENCOUNTER FOR GYNECOLOGICAL EXAMINATION WITHOUT ABNORMAL FINDING: ICD-10-CM

## 2025-01-02 SDOH — ECONOMIC STABILITY: FOOD INSECURITY: WITHIN THE PAST 12 MONTHS, THE FOOD YOU BOUGHT JUST DIDN'T LAST AND YOU DIDN'T HAVE MONEY TO GET MORE.: NEVER TRUE

## 2025-01-02 SDOH — ECONOMIC STABILITY: INCOME INSECURITY: HOW HARD IS IT FOR YOU TO PAY FOR THE VERY BASICS LIKE FOOD, HOUSING, MEDICAL CARE, AND HEATING?: NOT HARD AT ALL

## 2025-01-02 SDOH — ECONOMIC STABILITY: FOOD INSECURITY: WITHIN THE PAST 12 MONTHS, YOU WORRIED THAT YOUR FOOD WOULD RUN OUT BEFORE YOU GOT MONEY TO BUY MORE.: NEVER TRUE

## 2025-01-02 ASSESSMENT — PATIENT HEALTH QUESTIONNAIRE - PHQ9
1. LITTLE INTEREST OR PLEASURE IN DOING THINGS: NOT AT ALL
SUM OF ALL RESPONSES TO PHQ QUESTIONS 1-9: 0
2. FEELING DOWN, DEPRESSED OR HOPELESS: NOT AT ALL
SUM OF ALL RESPONSES TO PHQ9 QUESTIONS 1 & 2: 0

## 2025-01-02 NOTE — PROGRESS NOTES
Subjective:      Yanet Jones is a 64 y.o. female who presents for an annual exam. The patient has no gyn complaints today. The patient is not sexually active.   Wears seatbelts: yes Pap and mammogram collected and ordered today.  Patient had a hysterectomy remotely with well-healed vaginal cuff and no GYN complaints.  Regular exercise: no  Ever been transfused or tattooed?: no  The patient reports that domestic violence in her life is absent.   Menstrual History:  OB History          0    Para   0    Term   0       0    AB   0    Living   0         SAB   0    IAB   0    Ectopic   0    Molar   0    Multiple   0    Live Births   0               Menarche age: Age 10  No LMP recorded. Patient is postmenopausal.       Past Medical History:   Diagnosis Date    Arthritis     Asthma     DM (diabetes mellitus) (HCC)     HTN (hypertension)     Morbid obesity due to excess calories     Obstructive sleep apnea syndrome     Pulmonary hypertension (HCC)     Retention of urine      Patient Active Problem List    Diagnosis Date Noted    Cervical radiculopathy 11/10/2014    COPD with acute exacerbation (HCC) 2022    Gastroesophageal reflux disease with esophagitis 10/17/2022    Morbid obesity 09/15/2022    Arthritis 09/15/2022    Asthma 09/15/2022    DM (diabetes mellitus) (HCC) 09/15/2022    HTN (hypertension) 09/15/2022    Pulmonary hypertension (HCC) 09/15/2022    DDD (degenerative disc disease), lumbar 11/10/2014     Past Surgical History:   Procedure Laterality Date    APPENDECTOMY  1985    CATARACT REMOVAL Bilateral 2017    CHOLECYSTECTOMY  1981    HERNIA REPAIR  1998    HYSTERECTOMY (CERVIX STATUS UNKNOWN)  2017    JOINT REPLACEMENT Left 2009    knee    NERVE BLOCK N/A 2014    cervical #1    NERVE BLOCK N/A 12/10/2014    cervical epidural #2    NERVE BLOCK  2014    cerical epidural #3    UPPER GASTROINTESTINAL ENDOSCOPY N/A 10/17/2022    EGD BIOPSY performed by Casey Sevilla MD at [facility]

## 2025-01-02 NOTE — PROGRESS NOTES
Pt presents for annual exam.  Last pap preformed 2 years with Dr Bee  Pt reports last mammogram 2 years ago, reports cyst under L arm x5-6 years.   Hx, medications and pharmacy reviewed with pt.  Pt denies concerns states \"leaky bladder maybe but that's it\"

## 2025-01-07 LAB — GYNECOLOGY CYTOLOGY REPORT: NORMAL

## 2025-03-11 ENCOUNTER — OFFICE VISIT (OUTPATIENT)
Age: 65
End: 2025-03-11
Payer: MEDICARE

## 2025-03-11 ENCOUNTER — HOSPITAL ENCOUNTER (OUTPATIENT)
Dept: MAMMOGRAPHY | Age: 65
Discharge: HOME | End: 2025-03-13
Attending: OBSTETRICS & GYNECOLOGY
Payer: MEDICARE

## 2025-03-11 VITALS
SYSTOLIC BLOOD PRESSURE: 124 MMHG | OXYGEN SATURATION: 91 % | DIASTOLIC BLOOD PRESSURE: 86 MMHG | HEIGHT: 62 IN | WEIGHT: 293 LBS | BODY MASS INDEX: 53.92 KG/M2 | RESPIRATION RATE: 18 BRPM | TEMPERATURE: 98.1 F | HEART RATE: 87 BPM

## 2025-03-11 DIAGNOSIS — N89.8 VAGINAL DISCHARGE: Primary | ICD-10-CM

## 2025-03-11 DIAGNOSIS — B37.2 INTERTRIGINOUS CANDIDIASIS: ICD-10-CM

## 2025-03-11 DIAGNOSIS — E66.813 CLASS 3 SEVERE OBESITY DUE TO EXCESS CALORIES WITH SERIOUS COMORBIDITY AND BODY MASS INDEX (BMI) OF 50.0 TO 59.9 IN ADULT: ICD-10-CM

## 2025-03-11 DIAGNOSIS — Z01.419 ENCOUNTER FOR GYNECOLOGICAL EXAMINATION WITHOUT ABNORMAL FINDING: ICD-10-CM

## 2025-03-11 DIAGNOSIS — E66.01 CLASS 3 SEVERE OBESITY DUE TO EXCESS CALORIES WITH SERIOUS COMORBIDITY AND BODY MASS INDEX (BMI) OF 50.0 TO 59.9 IN ADULT: ICD-10-CM

## 2025-03-11 DIAGNOSIS — Z12.4 SCREENING FOR CERVICAL CANCER: ICD-10-CM

## 2025-03-11 PROCEDURE — 1123F ACP DISCUSS/DSCN MKR DOCD: CPT | Performed by: OBSTETRICS & GYNECOLOGY

## 2025-03-11 PROCEDURE — 1160F RVW MEDS BY RX/DR IN RCRD: CPT | Performed by: OBSTETRICS & GYNECOLOGY

## 2025-03-11 PROCEDURE — 99213 OFFICE O/P EST LOW 20 MIN: CPT | Performed by: OBSTETRICS & GYNECOLOGY

## 2025-03-11 PROCEDURE — 77063 BREAST TOMOSYNTHESIS BI: CPT

## 2025-03-11 PROCEDURE — 99459 PELVIC EXAMINATION: CPT | Performed by: OBSTETRICS & GYNECOLOGY

## 2025-03-11 PROCEDURE — 3074F SYST BP LT 130 MM HG: CPT | Performed by: OBSTETRICS & GYNECOLOGY

## 2025-03-11 PROCEDURE — 1159F MED LIST DOCD IN RCRD: CPT | Performed by: OBSTETRICS & GYNECOLOGY

## 2025-03-11 PROCEDURE — 3079F DIAST BP 80-89 MM HG: CPT | Performed by: OBSTETRICS & GYNECOLOGY

## 2025-03-11 RX ORDER — NYSTATIN 100000 [USP'U]/G
POWDER TOPICAL
Qty: 60 G | Refills: 2 | Status: SHIPPED | OUTPATIENT
Start: 2025-03-11

## 2025-03-11 SDOH — ECONOMIC STABILITY: FOOD INSECURITY: WITHIN THE PAST 12 MONTHS, YOU WORRIED THAT YOUR FOOD WOULD RUN OUT BEFORE YOU GOT MONEY TO BUY MORE.: NEVER TRUE

## 2025-03-11 SDOH — ECONOMIC STABILITY: FOOD INSECURITY: WITHIN THE PAST 12 MONTHS, THE FOOD YOU BOUGHT JUST DIDN'T LAST AND YOU DIDN'T HAVE MONEY TO GET MORE.: NEVER TRUE

## 2025-03-11 NOTE — PROGRESS NOTES
Subjective:       Yanet Jones is a 64 y.o. female here for vaginal pruritus and discharge.  Current Complaints: 64-year-old multiparous postmenopausal female presents to the office today with vaginal discharge and itching over the past week.  Patient had prior hysterectomy remotely with no complications.  Itching has become increasingly worse over the past few days.  Noted on exam today to have intertriginous Candida.  Rx sent for nystatin will await culture results.  Personal Health Questionnaire Reviewed: yes.     Gynecologic History  No LMP recorded. Patient is postmenopausal.  Contraception: none    OB History          0    Para   0    Term   0       0    AB   0    Living   0         SAB   0    IAB   0    Ectopic   0    Molar   0    Multiple   0    Live Births   0              Past Medical History:   Diagnosis Date    Arthritis     Asthma     DM (diabetes mellitus) (HCC)     HTN (hypertension)     Morbid obesity due to excess calories     Obstructive sleep apnea syndrome     Pulmonary hypertension (HCC)     Retention of urine      Patient Active Problem List    Diagnosis Date Noted    Cervical radiculopathy 11/10/2014    COPD with acute exacerbation (HCC) 2022    Gastroesophageal reflux disease with esophagitis 10/17/2022    Morbid obesity 09/15/2022    Arthritis 09/15/2022    Asthma 09/15/2022    DM (diabetes mellitus) (HCC) 09/15/2022    HTN (hypertension) 09/15/2022    Pulmonary hypertension (HCC) 09/15/2022    DDD (degenerative disc disease), lumbar 11/10/2014     Past Surgical History:   Procedure Laterality Date    APPENDECTOMY  1985    CATARACT REMOVAL Bilateral 2017    CHOLECYSTECTOMY  1981    HERNIA REPAIR  1998    HYSTERECTOMY (CERVIX STATUS UNKNOWN)  2017    JOINT REPLACEMENT Left 2009    knee    NERVE BLOCK N/A 2014    cervical #1    NERVE BLOCK N/A 12/10/2014    cervical epidural #2    NERVE BLOCK  2014    cerical epidural #3    UPPER GASTROINTESTINAL ENDOSCOPY N/A

## 2025-03-12 DIAGNOSIS — E66.813 CLASS 3 SEVERE OBESITY DUE TO EXCESS CALORIES WITH SERIOUS COMORBIDITY AND BODY MASS INDEX (BMI) OF 50.0 TO 59.9 IN ADULT: ICD-10-CM

## 2025-03-12 DIAGNOSIS — B37.2 INTERTRIGINOUS CANDIDIASIS: ICD-10-CM

## 2025-03-12 DIAGNOSIS — E66.01 CLASS 3 SEVERE OBESITY DUE TO EXCESS CALORIES WITH SERIOUS COMORBIDITY AND BODY MASS INDEX (BMI) OF 50.0 TO 59.9 IN ADULT: ICD-10-CM

## 2025-03-12 DIAGNOSIS — N89.8 VAGINAL DISCHARGE: ICD-10-CM

## 2025-05-08 ENCOUNTER — OFFICE VISIT (OUTPATIENT)
Age: 65
End: 2025-05-08
Payer: MEDICARE

## 2025-05-08 VITALS — WEIGHT: 292 LBS | BODY MASS INDEX: 53.73 KG/M2 | HEIGHT: 62 IN | TEMPERATURE: 97.9 F

## 2025-05-08 DIAGNOSIS — M17.11 PRIMARY OSTEOARTHRITIS OF RIGHT KNEE: Primary | ICD-10-CM

## 2025-05-08 DIAGNOSIS — Z96.652 HISTORY OF TOTAL KNEE REPLACEMENT, LEFT: ICD-10-CM

## 2025-05-08 PROCEDURE — 1159F MED LIST DOCD IN RCRD: CPT | Performed by: ORTHOPAEDIC SURGERY

## 2025-05-08 PROCEDURE — 1125F AMNT PAIN NOTED PAIN PRSNT: CPT | Performed by: ORTHOPAEDIC SURGERY

## 2025-05-08 PROCEDURE — 1123F ACP DISCUSS/DSCN MKR DOCD: CPT | Performed by: ORTHOPAEDIC SURGERY

## 2025-05-08 PROCEDURE — 99213 OFFICE O/P EST LOW 20 MIN: CPT | Performed by: ORTHOPAEDIC SURGERY

## 2025-05-08 NOTE — PROGRESS NOTES
Sexual Activity    Alcohol use: No    Drug use: No    Sexual activity: Not Currently   Other Topics Concern    Not on file   Social History Narrative    Not on file     Social Drivers of Health     Financial Resource Strain: Low Risk  (1/2/2025)    Overall Financial Resource Strain (CARDIA)     Difficulty of Paying Living Expenses: Not hard at all   Food Insecurity: No Food Insecurity (3/11/2025)    Hunger Vital Sign     Worried About Running Out of Food in the Last Year: Never true     Ran Out of Food in the Last Year: Never true   Transportation Needs: No Transportation Needs (3/11/2025)    PRAPARE - Transportation     Lack of Transportation (Medical): No     Lack of Transportation (Non-Medical): No   Physical Activity: Not on file   Stress: Not on file   Social Connections: Not on file   Intimate Partner Violence: Not on file   Housing Stability: Low Risk  (3/11/2025)    Housing Stability Vital Sign     Unable to Pay for Housing in the Last Year: No     Number of Times Moved in the Last Year: 0     Homeless in the Last Year: No     Family History   Problem Relation Age of Onset    Cancer Father     Diabetes Mother     Hypertension Mother      Patient Active Problem List   Diagnosis    Cervical radiculopathy    DDD (degenerative disc disease), lumbar    Morbid obesity (HCC)    Arthritis    Asthma    DM (diabetes mellitus) (HCC)    HTN (hypertension)    Pulmonary hypertension (HCC)    Gastroesophageal reflux disease with esophagitis    COPD with acute exacerbation (HCC)        Review of Systems:   As follows except as previously noted in HPI:  Constitutional: Negative for chills, diaphoresis,  fever   Respiratory: Negative for cough, shortness of breath and wheezing.    Cardiovascular: Negative for chest pain and palpitations.   Neurological: Negative for dizziness, syncope,   GI / : abdominal pain or cramping  Musculoskeletal: see HPI     Physical Exam:   Constitutional: The patient is alert and oriented x 3,

## 2025-05-08 NOTE — PATIENT INSTRUCTIONS
Patient is scheduled for R TKA on 7/2/2025     ** Medical clearance form was given to patient / Cardiac clearance completed **    ** Pre-op visit and Post - op visit made and given to patient **    ** Informed patient to STOP all NSAID'S and blood thinners 5 days prior to surgery **    ** No meals after 12 pm the night before the procedure **    ** Informed patient she will need a ride for after surgery **    ** Informed patient Hospital will reach out for time of surgery **

## 2025-06-03 RX ORDER — NYSTATIN 100000 [USP'U]/G
POWDER TOPICAL
Qty: 60 G | Refills: 0 | Status: SHIPPED | OUTPATIENT
Start: 2025-06-03

## 2025-06-23 ENCOUNTER — OFFICE VISIT (OUTPATIENT)
Age: 65
End: 2025-06-23
Payer: MEDICARE

## 2025-06-23 DIAGNOSIS — M17.11 PRIMARY OSTEOARTHRITIS OF RIGHT KNEE: Primary | ICD-10-CM

## 2025-06-23 PROCEDURE — 1159F MED LIST DOCD IN RCRD: CPT | Performed by: ORTHOPAEDIC SURGERY

## 2025-06-23 PROCEDURE — 1123F ACP DISCUSS/DSCN MKR DOCD: CPT | Performed by: ORTHOPAEDIC SURGERY

## 2025-06-23 PROCEDURE — 99213 OFFICE O/P EST LOW 20 MIN: CPT | Performed by: ORTHOPAEDIC SURGERY

## 2025-06-23 PROCEDURE — 1125F AMNT PAIN NOTED PAIN PRSNT: CPT | Performed by: ORTHOPAEDIC SURGERY

## 2025-06-23 NOTE — PROGRESS NOTES
Chief Complaint   Patient presents with    Pre-op Exam     Patient presents into the office today for a pre op examination of the R knee for RIGHT KNEE TOTAL ARTHROPLASTY on 7/2/2025.    Knee Pain     Patient describes the pain as burning / aching. She rates the level of pain at a 8 / 10        Yanet Jones returns today for follow-up of right knee.  She is ready to have her total knee done..      Past Medical History:   Diagnosis Date    Arthritis     Asthma     DM (diabetes mellitus) (HCC)     HTN (hypertension)     Morbid obesity due to excess calories (HCC)     Obstructive sleep apnea syndrome     Pulmonary hypertension (HCC)     Retention of urine      Past Surgical History:   Procedure Laterality Date    APPENDECTOMY  1985    CATARACT REMOVAL Bilateral 2017    CHOLECYSTECTOMY  1981    HERNIA REPAIR  1998    HYSTERECTOMY (CERVIX STATUS UNKNOWN)  2017    JOINT REPLACEMENT Left 2009    knee    NERVE BLOCK N/A 12/03/2014    cervical #1    NERVE BLOCK N/A 12/10/2014    cervical epidural #2    NERVE BLOCK  12/17/2014    cerical epidural #3    UPPER GASTROINTESTINAL ENDOSCOPY N/A 10/17/2022    EGD BIOPSY performed by Casey Sevilla MD at [facility] ENDOSCOPY       Current Outpatient Medications:     nystatin 345329 UNIT/GM powder, APPLY  POWDER TOPICALLY THREE TIMES DAILY, Disp: 60 g, Rfl: 0    empagliflozin (JARDIANCE) 25 MG tablet, Take 1 tablet by mouth daily, Disp: , Rfl:     albuterol-ipratropium (COMBIVENT RESPIMAT)  MCG/ACT AERS inhaler, Inhale 1 puff into the lungs in the morning and 1 puff at noon and 1 puff in the evening and 1 puff before bedtime., Disp: 1 each, Rfl: 1    budesonide-formoterol (SYMBICORT) 160-4.5 MCG/ACT AERO, Inhale 2 puffs into the lungs 2 times daily, Disp: 10.2 g, Rfl: 3    albuterol (PROVENTIL) (2.5 MG/3ML) 0.083% nebulizer solution, Take 3 mLs by nebulization every 4 hours as needed for Wheezing, Disp: 120 each, Rfl: 3    Respiratory Therapy Supplies (FULL KIT NEBULIZER SET)

## 2025-06-23 NOTE — H&P (VIEW-ONLY)
Alcohol use: No    Drug use: No    Sexual activity: Not Currently   Other Topics Concern    Not on file   Social History Narrative    Not on file     Social Drivers of Health     Financial Resource Strain: Low Risk  (1/2/2025)    Overall Financial Resource Strain (CARDIA)     Difficulty of Paying Living Expenses: Not hard at all   Food Insecurity: No Food Insecurity (3/11/2025)    Hunger Vital Sign     Worried About Running Out of Food in the Last Year: Never true     Ran Out of Food in the Last Year: Never true   Transportation Needs: No Transportation Needs (3/11/2025)    PRAPARE - Transportation     Lack of Transportation (Medical): No     Lack of Transportation (Non-Medical): No   Physical Activity: Not on file   Stress: Not on file   Social Connections: Not on file   Intimate Partner Violence: Not on file   Housing Stability: Low Risk  (3/11/2025)    Housing Stability Vital Sign     Unable to Pay for Housing in the Last Year: No     Number of Times Moved in the Last Year: 0     Homeless in the Last Year: No     Family History   Problem Relation Age of Onset    Cancer Father     Diabetes Mother     Hypertension Mother      Patient Active Problem List   Diagnosis    Cervical radiculopathy    DDD (degenerative disc disease), lumbar    Morbid obesity (HCC)    Arthritis    Asthma    DM (diabetes mellitus) (HCC)    HTN (hypertension)    Pulmonary hypertension (HCC)    Gastroesophageal reflux disease with esophagitis    COPD with acute exacerbation (HCC)    Primary osteoarthritis of right knee        Review of Systems:   As follows except as previously noted in HPI:  Constitutional: Negative for chills, diaphoresis,  fever   Respiratory: Negative for cough, shortness of breath and wheezing.    Cardiovascular: Negative for chest pain and palpitations.   Neurological: Negative for dizziness, syncope,   GI / : abdominal pain or cramping  Musculoskeletal: see HPI     Physical Exam:   Constitutional: The patient is alert

## 2025-06-25 RX ORDER — DULOXETIN HYDROCHLORIDE 30 MG/1
30 CAPSULE, DELAYED RELEASE ORAL DAILY
COMMUNITY

## 2025-06-25 RX ORDER — FLUTICASONE PROPIONATE 50 MCG
1 SPRAY, SUSPENSION (ML) NASAL DAILY PRN
COMMUNITY

## 2025-06-25 RX ORDER — TRIAMCINOLONE ACETONIDE 1 MG/G
CREAM TOPICAL 2 TIMES DAILY
COMMUNITY
Start: 2025-05-14

## 2025-06-25 RX ORDER — DOXYCYCLINE 100 MG/1
1 CAPSULE ORAL 2 TIMES DAILY
COMMUNITY
End: 2025-06-26

## 2025-06-25 RX ORDER — DICYCLOMINE HYDROCHLORIDE 10 MG/1
CAPSULE ORAL EVERY 6 HOURS PRN
COMMUNITY
End: 2025-06-26

## 2025-06-25 RX ORDER — DICLOFENAC POTASSIUM 50 MG/1
50 TABLET, FILM COATED ORAL EVERY 8 HOURS PRN
COMMUNITY
End: 2025-06-26

## 2025-06-25 RX ORDER — SODIUM CHLORIDE 9 MG/ML
INJECTION, SOLUTION INTRAVENOUS CONTINUOUS
OUTPATIENT
Start: 2025-07-02

## 2025-06-26 ENCOUNTER — PREP FOR PROCEDURE (OUTPATIENT)
Dept: ORTHOPEDIC SURGERY | Age: 65
End: 2025-06-26

## 2025-06-26 ENCOUNTER — HOSPITAL ENCOUNTER (OUTPATIENT)
Dept: PREADMISSION TESTING | Age: 65
Discharge: HOME | End: 2025-06-26
Payer: MEDICARE

## 2025-06-26 ENCOUNTER — HOSPITAL ENCOUNTER (OUTPATIENT)
Dept: GENERAL RADIOLOGY | Age: 65
Discharge: HOME | End: 2025-06-28
Payer: MEDICARE

## 2025-06-26 VITALS
TEMPERATURE: 97.5 F | RESPIRATION RATE: 20 BRPM | SYSTOLIC BLOOD PRESSURE: 137 MMHG | WEIGHT: 293 LBS | DIASTOLIC BLOOD PRESSURE: 80 MMHG | OXYGEN SATURATION: 96 % | HEART RATE: 73 BPM | BODY MASS INDEX: 54.14 KG/M2

## 2025-06-26 DIAGNOSIS — M17.11 PRIMARY OSTEOARTHRITIS OF RIGHT KNEE: Primary | ICD-10-CM

## 2025-06-26 LAB
ALBUMIN SERPL-MCNC: 4.1 G/DL (ref 3.5–5.2)
ALP SERPL-CCNC: 121 U/L (ref 35–104)
ALT SERPL-CCNC: 23 U/L (ref 0–35)
ANION GAP SERPL CALCULATED.3IONS-SCNC: 10 MMOL/L (ref 7–16)
AST SERPL-CCNC: 23 U/L (ref 0–35)
BASOPHILS # BLD: 0.02 K/UL (ref 0–0.2)
BASOPHILS NFR BLD: 0 % (ref 0–2)
BILIRUB SERPL-MCNC: 0.3 MG/DL (ref 0–1.2)
BILIRUB UR QL STRIP: NEGATIVE
BUN SERPL-MCNC: 19 MG/DL (ref 8–23)
CALCIUM SERPL-MCNC: 9.1 MG/DL (ref 8.8–10.2)
CHLORIDE SERPL-SCNC: 103 MMOL/L (ref 98–107)
CLARITY UR: CLEAR
CO2 SERPL-SCNC: 28 MMOL/L (ref 22–29)
COLOR UR: YELLOW
COMMENT: NORMAL
CREAT SERPL-MCNC: 0.9 MG/DL (ref 0.5–1)
EOSINOPHIL # BLD: 0.12 K/UL (ref 0.05–0.5)
EOSINOPHILS RELATIVE PERCENT: 3 % (ref 0–6)
ERYTHROCYTE [DISTWIDTH] IN BLOOD BY AUTOMATED COUNT: 16.3 % (ref 11.5–15)
GFR, ESTIMATED: 70 ML/MIN/1.73M2
GLUCOSE SERPL-MCNC: 118 MG/DL (ref 74–99)
GLUCOSE UR STRIP-MCNC: NEGATIVE MG/DL
HBA1C MFR BLD: 6.5 % (ref 4–5.6)
HCT VFR BLD AUTO: 37 % (ref 34–48)
HGB BLD-MCNC: 10.8 G/DL (ref 11.5–15.5)
HGB UR QL STRIP.AUTO: NEGATIVE
IMM GRANULOCYTES # BLD AUTO: <0.03 K/UL (ref 0–0.58)
IMM GRANULOCYTES NFR BLD: 0 % (ref 0–5)
INR PPP: 1
KETONES UR STRIP-MCNC: NEGATIVE MG/DL
LEUKOCYTE ESTERASE UR QL STRIP: NEGATIVE
LYMPHOCYTES NFR BLD: 1.19 K/UL (ref 1.5–4)
LYMPHOCYTES RELATIVE PERCENT: 25 % (ref 20–42)
MCH RBC QN AUTO: 23.7 PG (ref 26–35)
MCHC RBC AUTO-ENTMCNC: 29.2 G/DL (ref 32–34.5)
MCV RBC AUTO: 81.3 FL (ref 80–99.9)
MONOCYTES NFR BLD: 0.25 K/UL (ref 0.1–0.95)
MONOCYTES NFR BLD: 5 % (ref 2–12)
NEUTROPHILS NFR BLD: 67 % (ref 43–80)
NEUTS SEG NFR BLD: 3.18 K/UL (ref 1.8–7.3)
NITRITE UR QL STRIP: NEGATIVE
PARTIAL THROMBOPLASTIN TIME: 31.1 SEC (ref 24.5–35.1)
PH UR STRIP: 6 [PH] (ref 5–8)
PLATELET, FLUORESCENCE: 199 K/UL (ref 130–450)
PMV BLD AUTO: 10.9 FL (ref 7–12)
POTASSIUM SERPL-SCNC: 4.6 MMOL/L (ref 3.5–5.1)
PREALB SERPL-MCNC: 19 MG/DL (ref 20–40)
PROT SERPL-MCNC: 7.3 G/DL (ref 6.4–8.3)
PROT UR STRIP-MCNC: NEGATIVE MG/DL
PROTHROMBIN TIME: 11.3 SEC (ref 9.3–12.4)
RBC # BLD AUTO: 4.55 M/UL (ref 3.5–5.5)
SODIUM SERPL-SCNC: 142 MMOL/L (ref 136–145)
SP GR UR STRIP: 1.01 (ref 1–1.03)
UROBILINOGEN UR STRIP-ACNC: 0.2 EU/DL (ref 0–1)
WBC OTHER # BLD: 4.8 K/UL (ref 4.5–11.5)

## 2025-06-26 PROCEDURE — 85610 PROTHROMBIN TIME: CPT

## 2025-06-26 PROCEDURE — 87086 URINE CULTURE/COLONY COUNT: CPT

## 2025-06-26 PROCEDURE — 85730 THROMBOPLASTIN TIME PARTIAL: CPT

## 2025-06-26 PROCEDURE — 80053 COMPREHEN METABOLIC PANEL: CPT

## 2025-06-26 PROCEDURE — 84134 ASSAY OF PREALBUMIN: CPT

## 2025-06-26 PROCEDURE — 81003 URINALYSIS AUTO W/O SCOPE: CPT

## 2025-06-26 PROCEDURE — 85025 COMPLETE CBC W/AUTO DIFF WBC: CPT

## 2025-06-26 PROCEDURE — 71046 X-RAY EXAM CHEST 2 VIEWS: CPT

## 2025-06-26 PROCEDURE — 93005 ELECTROCARDIOGRAM TRACING: CPT | Performed by: ORTHOPAEDIC SURGERY

## 2025-06-26 PROCEDURE — 87081 CULTURE SCREEN ONLY: CPT

## 2025-06-26 PROCEDURE — 83036 HEMOGLOBIN GLYCOSYLATED A1C: CPT

## 2025-06-26 RX ORDER — LEVOTHYROXINE SODIUM 50 MCG
50 TABLET ORAL DAILY
COMMUNITY
Start: 2025-05-05

## 2025-06-26 RX ORDER — ACETAMINOPHEN 325 MG/1
1000 TABLET ORAL ONCE
Status: CANCELLED | OUTPATIENT
Start: 2025-06-26 | End: 2025-06-26

## 2025-06-26 RX ORDER — SODIUM CHLORIDE 0.9 % (FLUSH) 0.9 %
5-40 SYRINGE (ML) INJECTION PRN
Status: CANCELLED | OUTPATIENT
Start: 2025-06-26

## 2025-06-26 RX ORDER — SODIUM CHLORIDE 9 MG/ML
INJECTION, SOLUTION INTRAVENOUS PRN
Status: CANCELLED | OUTPATIENT
Start: 2025-06-26

## 2025-06-26 RX ORDER — CELECOXIB 100 MG/1
100 CAPSULE ORAL ONCE
Status: CANCELLED | OUTPATIENT
Start: 2025-06-26 | End: 2025-06-26

## 2025-06-26 RX ORDER — SODIUM CHLORIDE 0.9 % (FLUSH) 0.9 %
5-40 SYRINGE (ML) INJECTION EVERY 12 HOURS SCHEDULED
Status: CANCELLED | OUTPATIENT
Start: 2025-06-26

## 2025-06-26 ASSESSMENT — PAIN DESCRIPTION - ORIENTATION: ORIENTATION: RIGHT

## 2025-06-26 ASSESSMENT — PAIN DESCRIPTION - DESCRIPTORS: DESCRIPTORS: ACHING;DULL

## 2025-06-26 ASSESSMENT — PAIN DESCRIPTION - LOCATION: LOCATION: KNEE

## 2025-06-26 ASSESSMENT — PAIN SCALES - GENERAL: PAINLEVEL_OUTOF10: 8

## 2025-06-26 ASSESSMENT — PAIN DESCRIPTION - PAIN TYPE: TYPE: CHRONIC PAIN

## 2025-06-26 ASSESSMENT — PAIN DESCRIPTION - ONSET: ONSET: ON-GOING

## 2025-06-26 NOTE — PROGRESS NOTES
Patient attended preoperative Total Joint Camp on 6/26/2025.  Patient is scheduled to have an elective knee replacement.  Patient was educated regarding Disease Process, Medications, Smoking Cessation, Oxygenation, Incentive Spirometry and Deep Breath and Cough, signs and symptoms of postoperative joint infection that include: Fever, Chills, Pain Control, Drainage and Redness, post-op follow up with orthopaedic surgeon, dressing removal, staple removal, ambulatory devices which include a wheeled walker and cane, bed mobility, correct anatomical alignment, active range of motion, proper transferring technique, incision care, infection prevention measures, non-pharmacologic comfort measures, notification of inadequate pain control measures, pain scale for assessing level of pain, pharmacologic pain management, relaxation techniques.

## 2025-06-26 NOTE — PROGRESS NOTES
[facility]                                                                                                                    PRE OP INSTRUCTIONS FOR  Yanet Jones        Date: 6/26/2025    Date of surgery: 7/2/25   Arrival Time: Hospital will call you between 5pm and 7pm Tuesday with your final arrival time for surgery. Go to front of hospital and check in at information desk.    Nothing by mouth (NPO) as instructed. May have clear liquids up to 2 hours prior to surgery. Nothing solid after midnight. Examples: water, apple juice, black coffee, plain tea    Take the following medications with a small sip of water on the morning of Surgery: inhaler and nebulizer if needed and bring inhaler, Cymbalta, Metoprolol, Levothyroxine, Pantoprazole     Diabetics may take half the evening dose of insulin but none after midnight.  If you feel symptomatic or have low blood sugar morning of surgery drink 1-2 ounces of apple juice only. . If you take ____Jardiance____, stop 3-4 days prior to surgery.  Last dose Friday, June 27.      Aspirin, Ibuprofen, Advil, Naproxen, other Anti-inflammatory products should be stopped before surgery as directed by your surgeon, cardiologist, or primary care Doctor. Herbal supplements and Vitamin E should be stopped five days prior.  May take Tylenol unless instructed otherwise by your surgeon.    Check with your Doctor regarding stopping Plavix, Coumadin, Lovenox, Eliquis, Effient, or other blood thinners such as, pradaxa, lixiana, xaralto and savaysa.    Do not smoke, chew tobacco, vape, or use illicit drugs and do not drink any alcoholic beverages 24 hours prior to surgery.    You may brush your teeth the morning of surgery.      You MUST make arrangements for a responsible adult, 18 and over, to take you home after your surgery. You will not be allowed to leave alone or drive yourself home.  You will need someone stay with you the first 24 hrs. Your surgery will be

## 2025-06-26 NOTE — PROGRESS NOTES
[facility]                                                                                                                    PRE OP INSTRUCTIONS FOR  Yanet Jones        Date: 6/26/2025    Date of surgery: 7/3/25   Arrival Time: Hospital will call you the night before, between 5pm and 7pm with your final arrival time for surgery. Go to main entrance  front of hospital and check in at information desk.    Nothing by mouth (NPO) as instructed. May have clear liquids up to 2 hours prior to surgery. Nothing solid after midnight. Examples: water, apple juice, black coffee, plain tea    Take the following medications with a small sip of water on the morning of Surgery:  sotolol    Diabetics may take half the evening dose of insulin but none after midnight.  If you feel symptomatic or have low blood sugar morning of surgery drink 1-2 ounces of apple juice only. If you take a weekly insulin injection _______________, stop 7 days prior to surgery. If you take _______________, stop 3-4 days prior to surgery.    Aspirin, Ibuprofen, Advil, Naproxen, other Anti-inflammatory products should be stopped before surgery as directed by your surgeon, cardiologist, or primary care Doctor. Herbal supplements and Vitamin E should be stopped five days prior.  May take Tylenol unless instructed otherwise by your surgeon.    Check with your Doctor regarding stopping Plavix, Coumadin, Lovenox, Eliquis, Effient, or other blood thinners such as, pradaxa, lixiana, xaralto and savaysa.    Do not smoke, chew tobacco, vape, or use illicit drugs and do not drink any alcoholic beverages 24 hours prior to surgery.    You may brush your teeth the morning of surgery.      You MUST make arrangements for a responsible adult, 18 and over, to take you home after your surgery. You will not be allowed to leave alone or drive yourself home.  You will need someone stay with you the first 24 hrs. Your surgery will be cancelled if you

## 2025-06-27 LAB
EKG ATRIAL RATE: 66 BPM
EKG P AXIS: 65 DEGREES
EKG P-R INTERVAL: 162 MS
EKG Q-T INTERVAL: 400 MS
EKG QRS DURATION: 72 MS
EKG QTC CALCULATION (BAZETT): 419 MS
EKG R AXIS: 19 DEGREES
EKG T AXIS: 18 DEGREES
EKG VENTRICULAR RATE: 66 BPM
MICROORGANISM SPEC CULT: NO GROWTH
SERVICE CMNT-IMP: NORMAL
SPECIMEN DESCRIPTION: NORMAL

## 2025-06-27 NOTE — CARE COORDINATION
06/27/25 1617   Social/Functional History   Lives With Alone   Type of Home House   Home Layout Two level;Able to Live on Main level with bedroom/bathroom   Home Access Ramped entrance   Bathroom Shower/Tub Tub/Shower unit   Receives Help From Family   Condition of Participation: Discharge Planning   The Plan for Transition of Care is related to the following treatment goals: HHC   The Patient and/or Patient Representative was provided with a Choice of Provider? Patient   The Patient and/Or Patient Representative agree with the Discharge Plan? Yes   Freedom of Choice list was provided with basic dialogue that supports the patient's individualized plan of care/goals, treatment preferences, and shares the quality data associated with the providers?  Yes     6/27/2025: SS NOTE:  Met with pt in PAT, pt having surgery for a elective total hip arthroplasty on 7/2, pt plans to stay overnight and to return home next day, her sister will help her and transport her home, pt prefers [facility], referral made to [facility] who will follow post-op for home PT orders for SOC on 7/4 or 7/5 and referral made to Tessa at [facility] for a \"heavy duty\" w/w & bsc, per pt request, therapy notified to document pt's girth for dme coverage since pt is under 350 lbs, pt will sponge bathe, declined ttb, Nursing informed for HHC/DME orders needed post-op.Electronically signed by KELLY Cole on 6/27/2025 at 4:16 PM

## 2025-06-28 LAB
MICROORGANISM SPEC CULT: NORMAL
SERVICE CMNT-IMP: NORMAL
SPECIMEN DESCRIPTION: NORMAL

## 2025-07-01 ENCOUNTER — ANESTHESIA EVENT (OUTPATIENT)
Dept: OPERATING ROOM | Age: 65
End: 2025-07-01
Payer: MEDICARE

## 2025-07-01 ASSESSMENT — KOOS JR
BENDING TO THE FLOOR TO PICK UP OBJECT: EXTREME
STANDING UPRIGHT: SEVERE
RISING FROM SITTING: EXTREME
STRAIGHTENING KNEE FULLY: MODERATE
KOOS JR TOTAL INTERVAL SCORE: 28.251
TWISING OR PIVOTING ON KNEE: EXTREME
GOING UP OR DOWN STAIRS: SEVERE
HOW SEVERE IS YOUR KNEE STIFFNESS AFTER FIRST WAKING IN MORNING: SEVERE

## 2025-07-01 ASSESSMENT — PROMIS GLOBAL HEALTH SCALE
IN GENERAL, PLEASE RATE HOW WELL YOU CARRY OUT YOUR USUAL SOCIAL ACTIVITIES (INCLUDES ACTIVITIES AT HOME, AT WORK, AND IN YOUR COMMUNITY, AND RESPONSIBILITIES AS A PARENT, CHILD, SPOUSE, EMPLOYEE, FRIEND, ETC) [ON A SCALE OF 1 (POOR) TO 5 (EXCELLENT)]?: FAIR
IN THE PAST 7 DAYS, HOW WOULD YOU RATE YOUR FATIGUE ON AVERAGE [ON A SCALE FROM 1 (NONE) TO 5 (VERY SEVERE)]?: SEVERE
TO WHAT EXTENT ARE YOU ABLE TO CARRY OUT YOUR EVERYDAY PHYSICAL ACTIVITIES SUCH AS WALKING, CLIMBING STAIRS, CARRYING GROCERIES, OR MOVING A CHAIR [ON A SCALE OF 1 (NOT AT ALL) TO 5 (COMPLETELY)]?: MODERATELY
IN GENERAL, HOW WOULD YOU RATE YOUR MENTAL HEALTH, INCLUDING YOUR MOOD AND YOUR ABILITY TO THINK [ON A SCALE OF 1 (POOR) TO 5 (EXCELLENT)]?: VERY GOOD
WHO IS THE PERSON COMPLETING THE PROMIS V1.1 SURVEY?: SELF
SUM OF RESPONSES TO QUESTIONS 3, 6, 7, & 8: 16
SUM OF RESPONSES TO QUESTIONS 2, 4, 5, & 10: 13
IN THE PAST 7 DAYS, HOW WOULD YOU RATE YOUR PAIN ON AVERAGE [ON A SCALE FROM 0 (NO PAIN) TO 10 (WORST IMAGINABLE PAIN)]?: 8
IN GENERAL, HOW WOULD YOU RATE YOUR SATISFACTION WITH YOUR SOCIAL ACTIVITIES AND RELATIONSHIPS [ON A SCALE OF 1 (POOR) TO 5 (EXCELLENT)]?: GOOD
HOW IS THE PROMIS V1.1 BEING ADMINISTERED?: PAPER
IN GENERAL, WOULD YOU SAY YOUR QUALITY OF LIFE IS...[ON A SCALE OF 1 (POOR) TO 5 (EXCELLENT)]: GOOD
IN THE PAST 7 DAYS, HOW OFTEN HAVE YOU BEEN BOTHERED BY EMOTIONAL PROBLEMS, SUCH AS FEELING ANXIOUS, DEPRESSED, OR IRRITABLE [ON A SCALE FROM 1 (NEVER) TO 5 (ALWAYS)]?: SOMETIMES
IN GENERAL, HOW WOULD YOU RATE YOUR PHYSICAL HEALTH [ON A SCALE OF 1 (POOR) TO 5 (EXCELLENT)]?: GOOD
IN GENERAL, WOULD YOU SAY YOUR HEALTH IS...[ON A SCALE OF 1 (POOR) TO 5 (EXCELLENT)]: VERY GOOD

## 2025-07-02 ENCOUNTER — ANESTHESIA (OUTPATIENT)
Dept: OPERATING ROOM | Age: 65
End: 2025-07-02
Payer: MEDICARE

## 2025-07-02 ENCOUNTER — APPOINTMENT (OUTPATIENT)
Dept: GENERAL RADIOLOGY | Age: 65
End: 2025-07-02
Attending: ORTHOPAEDIC SURGERY
Payer: MEDICARE

## 2025-07-02 ENCOUNTER — HOSPITAL ENCOUNTER (OUTPATIENT)
Age: 65
Setting detail: OBSERVATION
Discharge: HOME HEALTH | End: 2025-07-03
Attending: ORTHOPAEDIC SURGERY | Admitting: ORTHOPAEDIC SURGERY
Payer: MEDICARE

## 2025-07-02 DIAGNOSIS — M17.11 PRIMARY OSTEOARTHRITIS OF RIGHT KNEE: ICD-10-CM

## 2025-07-02 DIAGNOSIS — Z98.890 POST-OPERATIVE STATE: Primary | ICD-10-CM

## 2025-07-02 LAB
GLUCOSE BLD-MCNC: 131 MG/DL (ref 74–99)
HCT VFR BLD AUTO: 32.6 % (ref 34–48)
HGB BLD-MCNC: 9.4 G/DL (ref 11.5–15.5)

## 2025-07-02 PROCEDURE — 6360000002 HC RX W HCPCS: Performed by: ORTHOPAEDIC SURGERY

## 2025-07-02 PROCEDURE — 36415 COLL VENOUS BLD VENIPUNCTURE: CPT

## 2025-07-02 PROCEDURE — 27447 TOTAL KNEE ARTHROPLASTY: CPT | Performed by: ORTHOPAEDIC SURGERY

## 2025-07-02 PROCEDURE — 85018 HEMOGLOBIN: CPT

## 2025-07-02 PROCEDURE — 76937 US GUIDE VASCULAR ACCESS: CPT

## 2025-07-02 PROCEDURE — 97161 PT EVAL LOW COMPLEX 20 MIN: CPT | Performed by: PHYSICAL THERAPIST

## 2025-07-02 PROCEDURE — 2580000003 HC RX 258: Performed by: ORTHOPAEDIC SURGERY

## 2025-07-02 PROCEDURE — 2500000003 HC RX 250 WO HCPCS: Performed by: NURSE ANESTHETIST, CERTIFIED REGISTERED

## 2025-07-02 PROCEDURE — 6360000002 HC RX W HCPCS: Performed by: NURSE ANESTHETIST, CERTIFIED REGISTERED

## 2025-07-02 PROCEDURE — 2580000003 HC RX 258: Performed by: ANESTHESIOLOGY

## 2025-07-02 PROCEDURE — 2580000003 HC RX 258: Performed by: NURSE ANESTHETIST, CERTIFIED REGISTERED

## 2025-07-02 PROCEDURE — G0378 HOSPITAL OBSERVATION PER HR: HCPCS

## 2025-07-02 PROCEDURE — 97110 THERAPEUTIC EXERCISES: CPT | Performed by: PHYSICAL THERAPIST

## 2025-07-02 PROCEDURE — 82962 GLUCOSE BLOOD TEST: CPT

## 2025-07-02 PROCEDURE — 6370000000 HC RX 637 (ALT 250 FOR IP): Performed by: ORTHOPAEDIC SURGERY

## 2025-07-02 PROCEDURE — 6360000002 HC RX W HCPCS: Performed by: ANESTHESIOLOGY

## 2025-07-02 PROCEDURE — 85014 HEMATOCRIT: CPT

## 2025-07-02 PROCEDURE — 73560 X-RAY EXAM OF KNEE 1 OR 2: CPT

## 2025-07-02 PROCEDURE — 2500000003 HC RX 250 WO HCPCS: Performed by: ORTHOPAEDIC SURGERY

## 2025-07-02 PROCEDURE — 6370000000 HC RX 637 (ALT 250 FOR IP): Performed by: ANESTHESIOLOGY

## 2025-07-02 RX ORDER — CELECOXIB 100 MG/1
100 CAPSULE ORAL ONCE
Status: COMPLETED | OUTPATIENT
Start: 2025-07-02 | End: 2025-07-02

## 2025-07-02 RX ORDER — IPRATROPIUM BROMIDE AND ALBUTEROL SULFATE 2.5; .5 MG/3ML; MG/3ML
1 SOLUTION RESPIRATORY (INHALATION) EVERY 4 HOURS PRN
Status: DISCONTINUED | OUTPATIENT
Start: 2025-07-02 | End: 2025-07-03 | Stop reason: HOSPADM

## 2025-07-02 RX ORDER — MIDAZOLAM HYDROCHLORIDE 1 MG/ML
INJECTION, SOLUTION INTRAMUSCULAR; INTRAVENOUS
Status: DISCONTINUED | OUTPATIENT
Start: 2025-07-02 | End: 2025-07-02 | Stop reason: SDUPTHER

## 2025-07-02 RX ORDER — SODIUM CHLORIDE 0.9 % (FLUSH) 0.9 %
5-40 SYRINGE (ML) INJECTION PRN
Status: DISCONTINUED | OUTPATIENT
Start: 2025-07-02 | End: 2025-07-03 | Stop reason: HOSPADM

## 2025-07-02 RX ORDER — ACETAMINOPHEN 500 MG
1000 TABLET ORAL ONCE
Status: COMPLETED | OUTPATIENT
Start: 2025-07-02 | End: 2025-07-02

## 2025-07-02 RX ORDER — DROPERIDOL 2.5 MG/ML
0.62 INJECTION, SOLUTION INTRAMUSCULAR; INTRAVENOUS
Status: DISCONTINUED | OUTPATIENT
Start: 2025-07-02 | End: 2025-07-02 | Stop reason: HOSPADM

## 2025-07-02 RX ORDER — SODIUM CHLORIDE 9 MG/ML
INJECTION, SOLUTION INTRAVENOUS PRN
Status: DISCONTINUED | OUTPATIENT
Start: 2025-07-02 | End: 2025-07-02 | Stop reason: HOSPADM

## 2025-07-02 RX ORDER — FENTANYL CITRATE 0.05 MG/ML
25 INJECTION, SOLUTION INTRAMUSCULAR; INTRAVENOUS EVERY 5 MIN PRN
Status: COMPLETED | OUTPATIENT
Start: 2025-07-02 | End: 2025-07-02

## 2025-07-02 RX ORDER — GLYCOPYRROLATE 0.2 MG/ML
INJECTION INTRAMUSCULAR; INTRAVENOUS
Status: DISCONTINUED | OUTPATIENT
Start: 2025-07-02 | End: 2025-07-02

## 2025-07-02 RX ORDER — HYDROCODONE BITARTRATE AND ACETAMINOPHEN 10; 325 MG/1; MG/1
1 TABLET ORAL EVERY 6 HOURS PRN
Status: DISCONTINUED | OUTPATIENT
Start: 2025-07-02 | End: 2025-07-03 | Stop reason: HOSPADM

## 2025-07-02 RX ORDER — MONTELUKAST SODIUM 10 MG/1
10 TABLET ORAL NIGHTLY
Status: DISCONTINUED | OUTPATIENT
Start: 2025-07-02 | End: 2025-07-03 | Stop reason: HOSPADM

## 2025-07-02 RX ORDER — SENNOSIDES 8.6 MG
325 CAPSULE ORAL 2 TIMES DAILY
Status: DISCONTINUED | OUTPATIENT
Start: 2025-07-02 | End: 2025-07-03

## 2025-07-02 RX ORDER — DEXMEDETOMIDINE HYDROCHLORIDE 100 UG/ML
INJECTION, SOLUTION INTRAVENOUS
Status: DISCONTINUED | OUTPATIENT
Start: 2025-07-02 | End: 2025-07-02 | Stop reason: SDUPTHER

## 2025-07-02 RX ORDER — MIDAZOLAM HYDROCHLORIDE 1 MG/ML
2 INJECTION, SOLUTION INTRAMUSCULAR; INTRAVENOUS
Status: DISCONTINUED | OUTPATIENT
Start: 2025-07-02 | End: 2025-07-02 | Stop reason: HOSPADM

## 2025-07-02 RX ORDER — ALBUTEROL SULFATE 90 UG/1
2 INHALANT RESPIRATORY (INHALATION) PRN
Status: DISCONTINUED | OUTPATIENT
Start: 2025-07-02 | End: 2025-07-02

## 2025-07-02 RX ORDER — DEXAMETHASONE SODIUM PHOSPHATE 10 MG/ML
INJECTION, SOLUTION INTRAMUSCULAR; INTRAVENOUS
Status: DISCONTINUED | OUTPATIENT
Start: 2025-07-02 | End: 2025-07-02 | Stop reason: SDUPTHER

## 2025-07-02 RX ORDER — SODIUM CHLORIDE 9 MG/ML
INJECTION, SOLUTION INTRAVENOUS
Status: DISCONTINUED | OUTPATIENT
Start: 2025-07-02 | End: 2025-07-02

## 2025-07-02 RX ORDER — PROPOFOL 10 MG/ML
INJECTION, EMULSION INTRAVENOUS
Status: DISCONTINUED | OUTPATIENT
Start: 2025-07-02 | End: 2025-07-02 | Stop reason: SDUPTHER

## 2025-07-02 RX ORDER — BISACODYL 5 MG/1
5 TABLET, DELAYED RELEASE ORAL DAILY
Status: DISCONTINUED | OUTPATIENT
Start: 2025-07-02 | End: 2025-07-03 | Stop reason: HOSPADM

## 2025-07-02 RX ORDER — ACETAMINOPHEN 325 MG/1
650 TABLET ORAL EVERY 6 HOURS
Status: DISCONTINUED | OUTPATIENT
Start: 2025-07-02 | End: 2025-07-03 | Stop reason: HOSPADM

## 2025-07-02 RX ORDER — METOPROLOL TARTRATE 25 MG/1
25 TABLET, FILM COATED ORAL 2 TIMES DAILY
Status: DISCONTINUED | OUTPATIENT
Start: 2025-07-02 | End: 2025-07-03 | Stop reason: HOSPADM

## 2025-07-02 RX ORDER — HYDROCHLOROTHIAZIDE 12.5 MG/1
12.5 TABLET ORAL DAILY
Status: DISCONTINUED | OUTPATIENT
Start: 2025-07-03 | End: 2025-07-03 | Stop reason: HOSPADM

## 2025-07-02 RX ORDER — SODIUM CHLORIDE, SODIUM LACTATE, POTASSIUM CHLORIDE, CALCIUM CHLORIDE 600; 310; 30; 20 MG/100ML; MG/100ML; MG/100ML; MG/100ML
INJECTION, SOLUTION INTRAVENOUS
Status: DISCONTINUED | OUTPATIENT
Start: 2025-07-02 | End: 2025-07-02 | Stop reason: SDUPTHER

## 2025-07-02 RX ORDER — EPHEDRINE SULFATE/0.9% NACL/PF 25 MG/5 ML
SYRINGE (ML) INTRAVENOUS
Status: DISCONTINUED | OUTPATIENT
Start: 2025-07-02 | End: 2025-07-02 | Stop reason: SDUPTHER

## 2025-07-02 RX ORDER — BUPIVACAINE HYDROCHLORIDE 7.5 MG/ML
INJECTION, SOLUTION INTRASPINAL
Status: DISCONTINUED | OUTPATIENT
Start: 2025-07-02 | End: 2025-07-02 | Stop reason: SDUPTHER

## 2025-07-02 RX ORDER — SODIUM CHLORIDE 0.9 % (FLUSH) 0.9 %
5-40 SYRINGE (ML) INJECTION EVERY 12 HOURS SCHEDULED
Status: DISCONTINUED | OUTPATIENT
Start: 2025-07-02 | End: 2025-07-03 | Stop reason: HOSPADM

## 2025-07-02 RX ORDER — SODIUM CHLORIDE, SODIUM LACTATE, POTASSIUM CHLORIDE, CALCIUM CHLORIDE 600; 310; 30; 20 MG/100ML; MG/100ML; MG/100ML; MG/100ML
INJECTION, SOLUTION INTRAVENOUS CONTINUOUS
Status: DISCONTINUED | OUTPATIENT
Start: 2025-07-02 | End: 2025-07-03 | Stop reason: HOSPADM

## 2025-07-02 RX ORDER — FENTANYL CITRATE 50 UG/ML
INJECTION, SOLUTION INTRAMUSCULAR; INTRAVENOUS
Status: DISCONTINUED | OUTPATIENT
Start: 2025-07-02 | End: 2025-07-02 | Stop reason: SDUPTHER

## 2025-07-02 RX ORDER — SODIUM CHLORIDE 9 MG/ML
INJECTION, SOLUTION INTRAVENOUS PRN
Status: DISCONTINUED | OUTPATIENT
Start: 2025-07-02 | End: 2025-07-03 | Stop reason: HOSPADM

## 2025-07-02 RX ORDER — SODIUM CHLORIDE 0.9 % (FLUSH) 0.9 %
5-40 SYRINGE (ML) INJECTION PRN
Status: DISCONTINUED | OUTPATIENT
Start: 2025-07-02 | End: 2025-07-02 | Stop reason: HOSPADM

## 2025-07-02 RX ORDER — MAGNESIUM HYDROXIDE/ALUMINUM HYDROXICE/SIMETHICONE 120; 1200; 1200 MG/30ML; MG/30ML; MG/30ML
15 SUSPENSION ORAL EVERY 6 HOURS PRN
Status: DISCONTINUED | OUTPATIENT
Start: 2025-07-02 | End: 2025-07-03 | Stop reason: HOSPADM

## 2025-07-02 RX ORDER — MEPERIDINE HYDROCHLORIDE 50 MG/ML
12.5 INJECTION INTRAMUSCULAR; INTRAVENOUS; SUBCUTANEOUS EVERY 5 MIN PRN
Status: DISCONTINUED | OUTPATIENT
Start: 2025-07-02 | End: 2025-07-02 | Stop reason: HOSPADM

## 2025-07-02 RX ORDER — OXYCODONE AND ACETAMINOPHEN 5; 325 MG/1; MG/1
1 TABLET ORAL 2 TIMES DAILY PRN
Status: DISCONTINUED | OUTPATIENT
Start: 2025-07-02 | End: 2025-07-03 | Stop reason: HOSPADM

## 2025-07-02 RX ORDER — LEVOTHYROXINE SODIUM 50 UG/1
50 TABLET ORAL DAILY
Status: DISCONTINUED | OUTPATIENT
Start: 2025-07-03 | End: 2025-07-03 | Stop reason: HOSPADM

## 2025-07-02 RX ORDER — HYDRALAZINE HYDROCHLORIDE 20 MG/ML
10 INJECTION INTRAMUSCULAR; INTRAVENOUS
Status: DISCONTINUED | OUTPATIENT
Start: 2025-07-02 | End: 2025-07-02 | Stop reason: HOSPADM

## 2025-07-02 RX ORDER — SENNA AND DOCUSATE SODIUM 50; 8.6 MG/1; MG/1
1 TABLET, FILM COATED ORAL 2 TIMES DAILY
Status: DISCONTINUED | OUTPATIENT
Start: 2025-07-02 | End: 2025-07-03 | Stop reason: HOSPADM

## 2025-07-02 RX ORDER — DULOXETIN HYDROCHLORIDE 30 MG/1
30 CAPSULE, DELAYED RELEASE ORAL DAILY
Status: DISCONTINUED | OUTPATIENT
Start: 2025-07-02 | End: 2025-07-03 | Stop reason: HOSPADM

## 2025-07-02 RX ORDER — IPRATROPIUM BROMIDE AND ALBUTEROL SULFATE 2.5; .5 MG/3ML; MG/3ML
1 SOLUTION RESPIRATORY (INHALATION)
Status: DISCONTINUED | OUTPATIENT
Start: 2025-07-02 | End: 2025-07-02 | Stop reason: HOSPADM

## 2025-07-02 RX ORDER — PHENYLEPHRINE HCL IN 0.9% NACL 1 MG/10 ML
SYRINGE (ML) INTRAVENOUS
Status: DISCONTINUED | OUTPATIENT
Start: 2025-07-02 | End: 2025-07-02 | Stop reason: SDUPTHER

## 2025-07-02 RX ORDER — KETOROLAC TROMETHAMINE 15 MG/ML
15 INJECTION, SOLUTION INTRAMUSCULAR; INTRAVENOUS EVERY 6 HOURS
Status: DISCONTINUED | OUTPATIENT
Start: 2025-07-02 | End: 2025-07-03 | Stop reason: HOSPADM

## 2025-07-02 RX ORDER — ONDANSETRON 2 MG/ML
INJECTION INTRAMUSCULAR; INTRAVENOUS
Status: DISCONTINUED | OUTPATIENT
Start: 2025-07-02 | End: 2025-07-02 | Stop reason: SDUPTHER

## 2025-07-02 RX ORDER — LABETALOL HYDROCHLORIDE 5 MG/ML
10 INJECTION, SOLUTION INTRAVENOUS
Status: DISCONTINUED | OUTPATIENT
Start: 2025-07-02 | End: 2025-07-02 | Stop reason: HOSPADM

## 2025-07-02 RX ORDER — ALLOPURINOL 100 MG/1
100 TABLET ORAL 2 TIMES DAILY
Status: DISCONTINUED | OUTPATIENT
Start: 2025-07-02 | End: 2025-07-03 | Stop reason: HOSPADM

## 2025-07-02 RX ORDER — GLYCOPYRROLATE 1 MG/5 ML
SYRINGE (ML) INTRAVENOUS
Status: DISCONTINUED | OUTPATIENT
Start: 2025-07-02 | End: 2025-07-02 | Stop reason: SDUPTHER

## 2025-07-02 RX ORDER — FENTANYL CITRATE 50 UG/ML
INJECTION, SOLUTION INTRAMUSCULAR; INTRAVENOUS
Status: COMPLETED | OUTPATIENT
Start: 2025-07-02 | End: 2025-07-02

## 2025-07-02 RX ORDER — SODIUM CHLORIDE 0.9 % (FLUSH) 0.9 %
5-40 SYRINGE (ML) INJECTION EVERY 12 HOURS SCHEDULED
Status: DISCONTINUED | OUTPATIENT
Start: 2025-07-02 | End: 2025-07-02 | Stop reason: HOSPADM

## 2025-07-02 RX ORDER — DIPHENHYDRAMINE HYDROCHLORIDE 50 MG/ML
12.5 INJECTION, SOLUTION INTRAMUSCULAR; INTRAVENOUS
Status: DISCONTINUED | OUTPATIENT
Start: 2025-07-02 | End: 2025-07-02 | Stop reason: HOSPADM

## 2025-07-02 RX ORDER — TRANEXAMIC ACID 10 MG/ML
1000 INJECTION, SOLUTION INTRAVENOUS
Status: DISCONTINUED | OUTPATIENT
Start: 2025-07-02 | End: 2025-07-02 | Stop reason: HOSPADM

## 2025-07-02 RX ORDER — TRANEXAMIC ACID 10 MG/ML
INJECTION, SOLUTION INTRAVENOUS
Status: DISCONTINUED | OUTPATIENT
Start: 2025-07-02 | End: 2025-07-02 | Stop reason: SDUPTHER

## 2025-07-02 RX ORDER — DEXAMETHASONE SODIUM PHOSPHATE 10 MG/ML
8 INJECTION, SOLUTION INTRAMUSCULAR; INTRAVENOUS ONCE
Status: COMPLETED | OUTPATIENT
Start: 2025-07-02 | End: 2025-07-02

## 2025-07-02 RX ORDER — SCOPOLAMINE 1 MG/3D
1 PATCH, EXTENDED RELEASE TRANSDERMAL ONCE
Status: DISCONTINUED | OUTPATIENT
Start: 2025-07-02 | End: 2025-07-03 | Stop reason: HOSPADM

## 2025-07-02 RX ORDER — SODIUM CHLORIDE 9 MG/ML
INJECTION, SOLUTION INTRAVENOUS CONTINUOUS
Status: DISCONTINUED | OUTPATIENT
Start: 2025-07-02 | End: 2025-07-02 | Stop reason: HOSPADM

## 2025-07-02 RX ORDER — MORPHINE SULFATE 4 MG/ML
4 INJECTION, SOLUTION INTRAMUSCULAR; INTRAVENOUS
Status: DISCONTINUED | OUTPATIENT
Start: 2025-07-02 | End: 2025-07-03 | Stop reason: HOSPADM

## 2025-07-02 RX ORDER — PANTOPRAZOLE SODIUM 40 MG/1
40 TABLET, DELAYED RELEASE ORAL 2 TIMES DAILY
Status: DISCONTINUED | OUTPATIENT
Start: 2025-07-02 | End: 2025-07-03 | Stop reason: HOSPADM

## 2025-07-02 RX ORDER — NALOXONE HYDROCHLORIDE 0.4 MG/ML
INJECTION, SOLUTION INTRAMUSCULAR; INTRAVENOUS; SUBCUTANEOUS PRN
Status: DISCONTINUED | OUTPATIENT
Start: 2025-07-02 | End: 2025-07-02 | Stop reason: HOSPADM

## 2025-07-02 RX ORDER — MORPHINE SULFATE 2 MG/ML
2 INJECTION, SOLUTION INTRAMUSCULAR; INTRAVENOUS
Status: DISCONTINUED | OUTPATIENT
Start: 2025-07-02 | End: 2025-07-03 | Stop reason: HOSPADM

## 2025-07-02 RX ORDER — PROCHLORPERAZINE EDISYLATE 5 MG/ML
5 INJECTION INTRAMUSCULAR; INTRAVENOUS
Status: DISCONTINUED | OUTPATIENT
Start: 2025-07-02 | End: 2025-07-02 | Stop reason: HOSPADM

## 2025-07-02 RX ORDER — ALBUTEROL SULFATE 0.83 MG/ML
2.5 SOLUTION RESPIRATORY (INHALATION) EVERY 4 HOURS PRN
Status: DISCONTINUED | OUTPATIENT
Start: 2025-07-02 | End: 2025-07-03 | Stop reason: HOSPADM

## 2025-07-02 RX ADMIN — DOCUSATE SODIUM 50MG AND SENNOSIDES 8.6MG 1 TABLET: 8.6; 5 TABLET, FILM COATED ORAL at 20:19

## 2025-07-02 RX ADMIN — KETOROLAC TROMETHAMINE 15 MG: 15 INJECTION, SOLUTION INTRAMUSCULAR; INTRAVENOUS at 15:33

## 2025-07-02 RX ADMIN — FENTANYL CITRATE 50 MCG: 50 INJECTION, SOLUTION INTRAMUSCULAR; INTRAVENOUS at 10:12

## 2025-07-02 RX ADMIN — ACETAMINOPHEN 650 MG: 325 TABLET ORAL at 17:36

## 2025-07-02 RX ADMIN — FENTANYL CITRATE 25 MCG: 50 INJECTION, SOLUTION INTRAMUSCULAR; INTRAVENOUS at 10:18

## 2025-07-02 RX ADMIN — HYDROMORPHONE HYDROCHLORIDE 0.5 MG: 1 INJECTION, SOLUTION INTRAMUSCULAR; INTRAVENOUS; SUBCUTANEOUS at 13:36

## 2025-07-02 RX ADMIN — FENTANYL CITRATE 25 MCG: 0.05 INJECTION, SOLUTION INTRAMUSCULAR; INTRAVENOUS at 13:27

## 2025-07-02 RX ADMIN — BISACODYL 5 MG: 5 TABLET, COATED ORAL at 15:41

## 2025-07-02 RX ADMIN — CEFAZOLIN 3000 MG: 3 INJECTION, POWDER, FOR SOLUTION INTRAMUSCULAR; INTRAVENOUS at 17:35

## 2025-07-02 RX ADMIN — ONDANSETRON 4 MG: 2 INJECTION, SOLUTION INTRAMUSCULAR; INTRAVENOUS at 10:39

## 2025-07-02 RX ADMIN — DULOXETINE HYDROCHLORIDE 30 MG: 30 CAPSULE, DELAYED RELEASE ORAL at 15:41

## 2025-07-02 RX ADMIN — FENTANYL CITRATE 50 MCG: 50 INJECTION, SOLUTION INTRAMUSCULAR; INTRAVENOUS at 11:01

## 2025-07-02 RX ADMIN — SODIUM CHLORIDE: 9 INJECTION, SOLUTION INTRAVENOUS at 08:24

## 2025-07-02 RX ADMIN — SODIUM CHLORIDE 3000 MG: 9 INJECTION, SOLUTION INTRAVENOUS at 10:16

## 2025-07-02 RX ADMIN — DEXMEDETOMIDINE HYDROCHLORIDE 0.3 MCG/KG/HR: 100 INJECTION, SOLUTION INTRAVENOUS at 10:35

## 2025-07-02 RX ADMIN — DEXAMETHASONE SODIUM PHOSPHATE 8 MG: 10 INJECTION, SOLUTION INTRAMUSCULAR; INTRAVENOUS at 08:28

## 2025-07-02 RX ADMIN — DEXMEDETOMIDINE HYDROCHLORIDE 6 MCG: 100 INJECTION, SOLUTION INTRAVENOUS at 10:14

## 2025-07-02 RX ADMIN — FENTANYL CITRATE 25 MCG: 0.05 INJECTION, SOLUTION INTRAMUSCULAR; INTRAVENOUS at 13:20

## 2025-07-02 RX ADMIN — MIDAZOLAM 2 MG: 1 INJECTION INTRAMUSCULAR; INTRAVENOUS at 10:06

## 2025-07-02 RX ADMIN — Medication 0.2 MG: at 10:10

## 2025-07-02 RX ADMIN — EPHEDRINE SULFATE 50 MG: 5 INJECTION INTRAVENOUS at 10:53

## 2025-07-02 RX ADMIN — PANTOPRAZOLE SODIUM 40 MG: 40 TABLET, DELAYED RELEASE ORAL at 20:19

## 2025-07-02 RX ADMIN — SODIUM CHLORIDE: 9 INJECTION, SOLUTION INTRAVENOUS at 10:05

## 2025-07-02 RX ADMIN — OXYCODONE AND ACETAMINOPHEN 1 TABLET: 5; 325 TABLET ORAL at 23:37

## 2025-07-02 RX ADMIN — BUPIVACAINE HYDROCHLORIDE IN DEXTROSE 12 MG: 7.5 INJECTION, SOLUTION SUBARACHNOID at 10:30

## 2025-07-02 RX ADMIN — HYDROMORPHONE HYDROCHLORIDE 0.5 MG: 1 INJECTION, SOLUTION INTRAMUSCULAR; INTRAVENOUS; SUBCUTANEOUS at 13:45

## 2025-07-02 RX ADMIN — TRANEXAMIC ACID 1 G: 10 INJECTION, SOLUTION INTRAVENOUS at 11:54

## 2025-07-02 RX ADMIN — MORPHINE SULFATE 4 MG: 4 INJECTION, SOLUTION INTRAMUSCULAR; INTRAVENOUS at 18:29

## 2025-07-02 RX ADMIN — CELECOXIB 100 MG: 100 CAPSULE ORAL at 08:17

## 2025-07-02 RX ADMIN — PHENYLEPHRINE HYDROCHLORIDE 4 MCG/MIN: 50 INJECTION INTRAVENOUS at 11:01

## 2025-07-02 RX ADMIN — KETOROLAC TROMETHAMINE 15 MG: 15 INJECTION, SOLUTION INTRAMUSCULAR; INTRAVENOUS at 20:19

## 2025-07-02 RX ADMIN — FENTANYL CITRATE 50 MCG: 50 INJECTION, SOLUTION INTRAMUSCULAR; INTRAVENOUS at 11:21

## 2025-07-02 RX ADMIN — TRANEXAMIC ACID 1 G: 10 INJECTION, SOLUTION INTRAVENOUS at 10:16

## 2025-07-02 RX ADMIN — METOPROLOL TARTRATE 25 MG: 25 TABLET, FILM COATED ORAL at 20:20

## 2025-07-02 RX ADMIN — PROPOFOL 70 MG: 10 INJECTION, EMULSION INTRAVENOUS at 10:50

## 2025-07-02 RX ADMIN — MONTELUKAST SODIUM 10 MG: 10 TABLET, COATED ORAL at 20:20

## 2025-07-02 RX ADMIN — DEXMEDETOMIDINE HYDROCHLORIDE 8 MCG: 100 INJECTION, SOLUTION INTRAVENOUS at 10:35

## 2025-07-02 RX ADMIN — MORPHINE SULFATE 4 MG: 4 INJECTION, SOLUTION INTRAMUSCULAR; INTRAVENOUS at 15:42

## 2025-07-02 RX ADMIN — Medication 100 MCG: at 11:29

## 2025-07-02 RX ADMIN — ALLOPURINOL 100 MG: 100 TABLET ORAL at 20:20

## 2025-07-02 RX ADMIN — FENTANYL CITRATE 25 MCG: 50 INJECTION, SOLUTION INTRAMUSCULAR; INTRAVENOUS at 10:30

## 2025-07-02 RX ADMIN — PROPOFOL 50 MCG/KG/MIN: 10 INJECTION, EMULSION INTRAVENOUS at 10:45

## 2025-07-02 RX ADMIN — Medication 100 MCG: at 11:04

## 2025-07-02 RX ADMIN — SODIUM CHLORIDE, SODIUM LACTATE, POTASSIUM CHLORIDE, AND CALCIUM CHLORIDE: .6; .31; .03; .02 INJECTION, SOLUTION INTRAVENOUS at 15:20

## 2025-07-02 RX ADMIN — SODIUM CHLORIDE, POTASSIUM CHLORIDE, SODIUM LACTATE AND CALCIUM CHLORIDE: 600; 310; 30; 20 INJECTION, SOLUTION INTRAVENOUS at 11:04

## 2025-07-02 RX ADMIN — Medication 100 MCG: at 12:03

## 2025-07-02 RX ADMIN — DEXAMETHASONE SODIUM PHOSPHATE 10 MG: 10 INJECTION, SOLUTION INTRAMUSCULAR; INTRAVENOUS at 11:01

## 2025-07-02 RX ADMIN — Medication 100 MCG: at 10:50

## 2025-07-02 RX ADMIN — ACETAMINOPHEN 1000 MG: 500 TABLET ORAL at 08:19

## 2025-07-02 RX ADMIN — MORPHINE SULFATE 4 MG: 4 INJECTION, SOLUTION INTRAMUSCULAR; INTRAVENOUS at 20:47

## 2025-07-02 RX ADMIN — PROPOFOL 20 MG: 10 INJECTION, EMULSION INTRAVENOUS at 11:19

## 2025-07-02 ASSESSMENT — PAIN DESCRIPTION - ORIENTATION
ORIENTATION: RIGHT
ORIENTATION: LEFT

## 2025-07-02 ASSESSMENT — PAIN DESCRIPTION - LOCATION
LOCATION: KNEE

## 2025-07-02 ASSESSMENT — PAIN SCALES - GENERAL
PAINLEVEL_OUTOF10: 5
PAINLEVEL_OUTOF10: 10
PAINLEVEL_OUTOF10: 3
PAINLEVEL_OUTOF10: 10
PAINLEVEL_OUTOF10: 5
PAINLEVEL_OUTOF10: 5
PAINLEVEL_OUTOF10: 7
PAINLEVEL_OUTOF10: 8
PAINLEVEL_OUTOF10: 7
PAINLEVEL_OUTOF10: 2
PAINLEVEL_OUTOF10: 5
PAINLEVEL_OUTOF10: 6
PAINLEVEL_OUTOF10: 8

## 2025-07-02 ASSESSMENT — PAIN DESCRIPTION - PAIN TYPE
TYPE: SURGICAL PAIN

## 2025-07-02 ASSESSMENT — LIFESTYLE VARIABLES
HOW MANY STANDARD DRINKS CONTAINING ALCOHOL DO YOU HAVE ON A TYPICAL DAY: PATIENT DOES NOT DRINK
HOW OFTEN DO YOU HAVE A DRINK CONTAINING ALCOHOL: NEVER

## 2025-07-02 ASSESSMENT — PAIN DESCRIPTION - FREQUENCY
FREQUENCY: CONTINUOUS

## 2025-07-02 ASSESSMENT — PAIN DESCRIPTION - DESCRIPTORS
DESCRIPTORS: ACHING;THROBBING;SHOOTING;SHARP
DESCRIPTORS: SHARP
DESCRIPTORS: SORE;THROBBING;PRESSURE
DESCRIPTORS: ACHING
DESCRIPTORS: ACHING
DESCRIPTORS: SHARP
DESCRIPTORS: ACHING
DESCRIPTORS: ACHING;DISCOMFORT;SORE
DESCRIPTORS: SHARP

## 2025-07-02 ASSESSMENT — PAIN - FUNCTIONAL ASSESSMENT
PAIN_FUNCTIONAL_ASSESSMENT: 0-10
PAIN_FUNCTIONAL_ASSESSMENT: PREVENTS OR INTERFERES WITH ALL ACTIVE AND SOME PASSIVE ACTIVITIES
PAIN_FUNCTIONAL_ASSESSMENT: PREVENTS OR INTERFERES WITH ALL ACTIVE AND SOME PASSIVE ACTIVITIES

## 2025-07-02 NOTE — ANESTHESIA PROCEDURE NOTES
Spinal Block    Patient location during procedure: OR  End time: 7/2/2025 10:32 AM  Reason for block: primary anesthetic  Staffing  Performed: anesthesiologist, resident/CRNA and other anesthesia staff   Anesthesiologist: Gavin Hernandez MD  Resident/CRNA: Arian Lopes APRN - CRNA  Other anesthesia staff: Pierre Ayoub RN  Performed by: Arian Lopes APRN - CRNA  Authorized by: Gavin Hernandez MD    Spinal Block  Patient position: sitting  Prep: Betadine  Patient monitoring: continuous capnometry, frequent blood pressure checks, oxygen, continuous pulse ox and cardiac monitor  Approach: midline  Location: L3/L4  Guidance: paresthesia technique  Provider prep: mask and sterile gloves  Local infiltration: lidocaine  Needle  Needle type: Pencan   Needle gauge: 24 G  Needle length: 4 in  Assessment  Sensory level: T8  Swirl obtained: Yes  CSF: clear  Attempts: 1  Hemodynamics: stable  Preanesthetic Checklist  Completed: patient identified, IV checked, site marked, risks and benefits discussed, surgical/procedural consents, equipment checked, pre-op evaluation, timeout performed, anesthesia consent given, oxygen available, monitors applied/VS acknowledged, fire risk safety assessment completed and verbalized and blood product R/B/A discussed and consented

## 2025-07-02 NOTE — OP NOTE
Operative Note      Patient: Yanet Jones  YOB: 1960  MRN: [id number]    Date of Procedure: 7/2/2025    Pre-Op Diagnosis Codes:      * Primary osteoarthritis of right knee [M17.11]    Post-Op Diagnosis: Same       Procedure(s):  **IV TEAM* RIGHT KNEE TOTAL ARTHROPLASTY    Surgeon(s):  Loyd Barrett Jr., MD    Assistant:   First Assistant: Troy Ayers RN    Anesthesia: Spinal    Estimated Blood Loss (mL): Minimal    Complications: None    Specimens:   * No specimens in log *    Implants:  Implant Name Type Inv. Item Serial No.  Lot No. LRB No. Used Action   INSERT TIB SZ 6 [id number] RT KNEE EMPOWR 3D E + - [id number]  INSERT TIB SZ 6 [id number] RT KNEE EMPOWR 3D E +  [employer]- [id number] Right 1 Implanted   BASEPLATE TIB 6 KNEE RT POROUS EMPOWR KNEE - [id number]  BASEPLATE TIB 6 KNEE RT POROUS EMPOWR KNEE  [employer]- [id number] Right 1 Implanted   COMPONENT FEM 6R RT POROUS COAT EMPOWR 3D KNEE - [id number]  COMPONENT FEM 6R RT POROUS COAT EMPOWR 3D KNEE  [employer]- [id number] Right 1 Implanted   PATELLA AM POROUS METAL BACKED EPLUS 29MM - [id number]  PATELLA AM POROUS METAL BACKED EPLUS 29MM  [employer]- [id number] Right 1 Implanted         Drains:   Closed/Suction Drain Right Knee Bulb (Active)       Findings:  Infection Present At Time Of Surgery (PATOS) (choose all levels that have infection present):  No infection present  Other Findings: Severe arthritis of the knee    Detailed Description of Procedure:   The patient was taken to the operating room suite.  Anesthesia was then given without difficulty.  Tourniquet was placed on the right leg after the proper side was confirmed.  The right leg was then sterilely prepped and draped in the usual fashion.  Proper timeout was performed.  The right leg was exsanguinated tourniquet raised to 350 mmHg.  Midline incision was made.  Carried down through the

## 2025-07-02 NOTE — ANESTHESIA PRE PROCEDURE
Department of Anesthesiology  Preprocedure Note       Name:  Yanet Jones   Age:  64 y.o.  :  1960                                          MRN:  [id number]         Date:  2025      Surgeon: Surgeon(s):  Loyd Barrett Jr., MD    Procedure: Procedure(s):  **IV TEAM* RIGHT KNEE TOTAL ARTHROPLASTY    Medications prior to admission:   Prior to Admission medications    Medication Sig Start Date End Date Taking? Authorizing Provider   SYNTHROID 50 MCG tablet Take 1 tablet by mouth Daily 25  Yes Kamille Shah MD   SITagliptin (JANUVIA) 50 MG tablet Take 1 tablet by mouth daily   Yes Kamille Shah MD   empagliflozin (JARDIANCE) 25 MG tablet Take 1 tablet by mouth daily   Yes Kamille Shah MD   pantoprazole (PROTONIX) 40 MG tablet Take 1 tablet by mouth 2 times daily   Yes Kamille Shah MD   metoprolol (LOPRESSOR) 25 MG tablet Take 1 tablet by mouth 2 times daily   Yes Kamille Shah MD   triamcinolone (KENALOG) 0.1 % cream 2 times daily 25   Kamille Shah MD   fluticasone (FLONASE) 50 MCG/ACT nasal spray 1 spray by Each Nostril route daily as needed    Kamille Shah MD   DULoxetine (CYMBALTA) 30 MG extended release capsule Take 1 capsule by mouth daily    ProviderKamille MD   nystatin 651960 UNIT/GM powder APPLY  POWDER TOPICALLY THREE TIMES DAILY 6/3/25   Troy Del Real MD   albuterol-ipratropium (COMBIVENT RESPIMAT)  MCG/ACT AERS inhaler Inhale 1 puff into the lungs in the morning and 1 puff at noon and 1 puff in the evening and 1 puff before bedtime. 23   Robert Vega DO   albuterol (PROVENTIL) (2.5 MG/3ML) 0.083% nebulizer solution Take 3 mLs by nebulization every 4 hours as needed for Wheezing 23   Robert Vega DO   Respiratory Therapy Supplies (FULL KIT NEBULIZER SET) MISC Use as directed with nebulized medication. 23   Robert Vega DO   lidocaine (LIDODERM) 5 % Place 1 patch onto the skin daily 12

## 2025-07-02 NOTE — ANESTHESIA POSTPROCEDURE EVALUATION
Department of Anesthesiology  Postprocedure Note    Patient: Yanet Jones  MRN: [id number]  YOB: 1960  Date of evaluation: 7/2/2025    Procedure Summary       Date: 07/02/25 Room / Location: [address]    Anesthesia Start: 1005 Anesthesia Stop: 1214    Procedure: **IV TEAM* RIGHT KNEE TOTAL ARTHROPLASTY (Right: Knee) Diagnosis:       Primary osteoarthritis of right knee      (Primary osteoarthritis of right knee [M17.11])    Surgeons: Loyd Barrett Jr., MD Responsible Provider: Gavin Hernandez MD    Anesthesia Type: spinal ASA Status: 3            Anesthesia Type: No value filed.    Lester Phase I: Lester Score: 7    Lester Phase II:      Anesthesia Post Evaluation    Patient location during evaluation: PACU  Patient participation: complete - patient participated  Level of consciousness: awake and awake and alert  Pain score: 0  Airway patency: patent  Nausea & Vomiting: no nausea and no vomiting  Cardiovascular status: hemodynamically stable  Respiratory status: acceptable and nasal cannula  Hydration status: euvolemic  Pain management: adequate        No notable events documented.

## 2025-07-03 VITALS
BODY MASS INDEX: 53.92 KG/M2 | HEART RATE: 74 BPM | RESPIRATION RATE: 12 BRPM | TEMPERATURE: 97.7 F | SYSTOLIC BLOOD PRESSURE: 108 MMHG | WEIGHT: 293 LBS | DIASTOLIC BLOOD PRESSURE: 59 MMHG | OXYGEN SATURATION: 97 % | HEIGHT: 62 IN

## 2025-07-03 LAB
ANION GAP SERPL CALCULATED.3IONS-SCNC: 10 MMOL/L (ref 7–16)
BUN SERPL-MCNC: 18 MG/DL (ref 8–23)
CALCIUM SERPL-MCNC: 8.3 MG/DL (ref 8.8–10.2)
CHLORIDE SERPL-SCNC: 105 MMOL/L (ref 98–107)
CO2 SERPL-SCNC: 25 MMOL/L (ref 22–29)
CREAT SERPL-MCNC: 0.9 MG/DL (ref 0.5–1)
GFR, ESTIMATED: 72 ML/MIN/1.73M2
GLUCOSE SERPL-MCNC: 140 MG/DL (ref 74–99)
POTASSIUM SERPL-SCNC: 4.7 MMOL/L (ref 3.5–5.1)
SODIUM SERPL-SCNC: 140 MMOL/L (ref 136–145)

## 2025-07-03 PROCEDURE — 97530 THERAPEUTIC ACTIVITIES: CPT

## 2025-07-03 PROCEDURE — 97535 SELF CARE MNGMENT TRAINING: CPT

## 2025-07-03 PROCEDURE — 96375 TX/PRO/DX INJ NEW DRUG ADDON: CPT

## 2025-07-03 PROCEDURE — G0378 HOSPITAL OBSERVATION PER HR: HCPCS

## 2025-07-03 PROCEDURE — 36415 COLL VENOUS BLD VENIPUNCTURE: CPT

## 2025-07-03 PROCEDURE — 6370000000 HC RX 637 (ALT 250 FOR IP): Performed by: ORTHOPAEDIC SURGERY

## 2025-07-03 PROCEDURE — 96376 TX/PRO/DX INJ SAME DRUG ADON: CPT

## 2025-07-03 PROCEDURE — 2500000003 HC RX 250 WO HCPCS: Performed by: ORTHOPAEDIC SURGERY

## 2025-07-03 PROCEDURE — 97165 OT EVAL LOW COMPLEX 30 MIN: CPT

## 2025-07-03 PROCEDURE — 6360000002 HC RX W HCPCS: Performed by: ORTHOPAEDIC SURGERY

## 2025-07-03 PROCEDURE — 97116 GAIT TRAINING THERAPY: CPT

## 2025-07-03 PROCEDURE — 96374 THER/PROPH/DIAG INJ IV PUSH: CPT

## 2025-07-03 PROCEDURE — 2580000003 HC RX 258: Performed by: ORTHOPAEDIC SURGERY

## 2025-07-03 PROCEDURE — 80048 BASIC METABOLIC PNL TOTAL CA: CPT

## 2025-07-03 RX ORDER — OXYCODONE AND ACETAMINOPHEN 5; 325 MG/1; MG/1
1 TABLET ORAL EVERY 6 HOURS PRN
Qty: 42 TABLET | Refills: 0 | Status: SHIPPED | OUTPATIENT
Start: 2025-07-03 | End: 2025-07-17

## 2025-07-03 RX ADMIN — DULOXETINE HYDROCHLORIDE 30 MG: 30 CAPSULE, DELAYED RELEASE ORAL at 08:06

## 2025-07-03 RX ADMIN — EMPAGLIFLOZIN 25 MG: 10 TABLET, FILM COATED ORAL at 08:05

## 2025-07-03 RX ADMIN — ACETAMINOPHEN 650 MG: 325 TABLET ORAL at 11:17

## 2025-07-03 RX ADMIN — OXYCODONE AND ACETAMINOPHEN 1 TABLET: 5; 325 TABLET ORAL at 08:04

## 2025-07-03 RX ADMIN — DOCUSATE SODIUM 50MG AND SENNOSIDES 8.6MG 1 TABLET: 8.6; 5 TABLET, FILM COATED ORAL at 08:06

## 2025-07-03 RX ADMIN — BISACODYL 5 MG: 5 TABLET, COATED ORAL at 08:05

## 2025-07-03 RX ADMIN — HYDROCHLOROTHIAZIDE 12.5 MG: 12.5 TABLET ORAL at 08:05

## 2025-07-03 RX ADMIN — ACETAMINOPHEN 650 MG: 325 TABLET ORAL at 05:59

## 2025-07-03 RX ADMIN — KETOROLAC TROMETHAMINE 15 MG: 15 INJECTION, SOLUTION INTRAMUSCULAR; INTRAVENOUS at 08:19

## 2025-07-03 RX ADMIN — SODIUM CHLORIDE, PRESERVATIVE FREE 10 ML: 5 INJECTION INTRAVENOUS at 08:09

## 2025-07-03 RX ADMIN — MORPHINE SULFATE 4 MG: 4 INJECTION, SOLUTION INTRAMUSCULAR; INTRAVENOUS at 06:09

## 2025-07-03 RX ADMIN — PANTOPRAZOLE SODIUM 40 MG: 40 TABLET, DELAYED RELEASE ORAL at 08:05

## 2025-07-03 RX ADMIN — ALLOPURINOL 100 MG: 100 TABLET ORAL at 08:07

## 2025-07-03 RX ADMIN — CEFAZOLIN 3000 MG: 3 INJECTION, POWDER, FOR SOLUTION INTRAMUSCULAR; INTRAVENOUS at 02:33

## 2025-07-03 RX ADMIN — LEVOTHYROXINE SODIUM 50 MCG: 0.05 TABLET ORAL at 05:59

## 2025-07-03 RX ADMIN — METOPROLOL TARTRATE 25 MG: 25 TABLET, FILM COATED ORAL at 08:05

## 2025-07-03 RX ADMIN — KETOROLAC TROMETHAMINE 15 MG: 15 INJECTION, SOLUTION INTRAMUSCULAR; INTRAVENOUS at 02:30

## 2025-07-03 RX ADMIN — MORPHINE SULFATE 4 MG: 4 INJECTION, SOLUTION INTRAMUSCULAR; INTRAVENOUS at 02:36

## 2025-07-03 ASSESSMENT — PAIN SCALES - GENERAL
PAINLEVEL_OUTOF10: 10
PAINLEVEL_OUTOF10: 10
PAINLEVEL_OUTOF10: 3
PAINLEVEL_OUTOF10: 2

## 2025-07-03 ASSESSMENT — PAIN DESCRIPTION - DESCRIPTORS
DESCRIPTORS: ACHING;DISCOMFORT;SORE
DESCRIPTORS: ACHING

## 2025-07-03 ASSESSMENT — PAIN DESCRIPTION - LOCATION
LOCATION: KNEE

## 2025-07-03 ASSESSMENT — PAIN DESCRIPTION - PAIN TYPE: TYPE: SURGICAL PAIN

## 2025-07-03 ASSESSMENT — PAIN DESCRIPTION - ORIENTATION
ORIENTATION: RIGHT

## 2025-07-03 NOTE — CARE COORDINATION
7/3/2025: SS NOTE:  SS Consult for post-op d/c planning & HHC/DME orders noted, pt seen in PAT and post-op, d/c plan confirmed for d/c home with [facility], pt is temporarily going to her sister's home now, address- [address], [address], pt's dme was delivered to her room and sw notified Natacha at [facility] of sister's address and discharge today, agency will contact pt to schedule her PT visit for tomorrow 7/4, pt's sister to transport her home, Nursing informed. Electronically signed by KELLY Cole on 7/3/2025 at 11:58 AM

## 2025-07-03 NOTE — DISCHARGE SUMMARY
tablet  Take 1 tablet by mouth 2 times daily    hydrochlorothiazide (MICROZIDE) 12.5 MG capsule  Take 1 capsule by mouth daily    oxyCODONE-acetaminophen (PERCOCET) 5-325 MG per tablet  Take 1 tablet by mouth 2 times daily as needed for Pain.    montelukast (SINGULAIR) 10 MG tablet  Take 1 tablet by mouth nightly          Current Discharge Medication List        Time Spent on Discharge:  minutes were spent in patient examination, evaluation, counseling as well as medication reconciliation, prescriptions for required medications, discharge plan, and follow up.    Electronically signed by Loyd Barrett Jr, MD on 7/3/25 at 12:25 PM EDT

## 2025-07-03 NOTE — DISCHARGE INSTRUCTIONS
time after surgery. When you are ready, you can use a cane. You may be able to walk without support after a couple weeks, or when you are comfortable.  You will need to do months of physical rehabilitation (rehab) after a knee replacement. Rehab will help you strengthen the muscles of the knee and help you regain movement. After you recover, your artificial knee will allow you to do normal daily activities with less pain or no pain at all. You may be able to hike, dance, or ride a bike. Talk to your doctor about whether you can do more strenuous activities. Always tell your caregivers that you have an artificial knee.  How long it will take to walk on your own, return to normal activities, and go back to work depends on your health and how well your rehabilitation (rehab) program goes. The better you do with your rehab exercises, the quicker you will get your strength and movement back.  This care sheet gives you a general idea about how long it will take for you to recover. But each person recovers at a different pace. Follow the steps below to get better as quickly as possible.  How can you care for yourself at home?  Activity  Rest when you feel tired. You may take a nap, but don't stay in bed all day. When you sit, use a chair with arms. You can use the arms to help you stand up.  Work with your physical therapist to find the best way to exercise. What you can do as your knee heals will depend on whether your new knee is cemented or uncemented. You may not be able to do certain things for a while if your new knee is uncemented.  After your knee has healed enough, you can do more strenuous activities with caution.  You can golf, but you may want to use a golf cart for some time. And don't wear shoes with spikes.  You can bike on a flat road or on a stationary bike. Talk to your doctor before biking uphill.  Your doctor may suggest that you stay away from activities that put stress on your knee. These include

## 2025-07-03 NOTE — INTERVAL H&P NOTE
Update History & Physical    The patient's History and Physical of  , previous office note was reviewed with the patient and I examined the patient. There was no change. The surgical site was confirmed by the patient and me.     Plan: The risks, benefits, expected outcome, and alternative to the recommended procedure have been discussed with the patient. Patient understands and wants to proceed with the procedure.     Electronically signed by Loyd Barrett Jr, MD on 7/3/2025 at 12:24 PM

## 2025-07-03 NOTE — PLAN OF CARE
Problem: Discharge Planning  Goal: Discharge to home or other facility with appropriate resources  7/3/2025 0930 by Rebecca Dai RN  Outcome: Completed  7/3/2025 0205 by Shahla Reynoso RN  Outcome: Progressing     Problem: Pain  Goal: Verbalizes/displays adequate comfort level or baseline comfort level  7/3/2025 0930 by Rebecca Dai RN  Outcome: Completed  7/3/2025 0205 by Shahla Reynoso RN  Outcome: Progressing     Problem: Safety - Adult  Goal: Free from fall injury  7/3/2025 0930 by Rebecca Dai RN  Outcome: Completed  7/3/2025 0205 by Shahla Reynoso RN  Outcome: Progressing     Problem: Chronic Conditions and Co-morbidities  Goal: Patient's chronic conditions and co-morbidity symptoms are monitored and maintained or improved  7/3/2025 0930 by Rebecca Dai RN  Outcome: Completed  7/3/2025 0205 by Shahla Reynoso RN  Outcome: Progressing

## 2025-07-03 NOTE — PROGRESS NOTES
Faxed bA1c to Dr. Etienne and Dr. Barrett.  
Faxed cbc to dr. Barrett and dr. Etienne.    
Physical Therapy Initial Evaluation/Plan of Care    Room #:  0321/0321-02  Patient Name: Yanet Jones  YOB: 1960  MRN: [id number]    Date of Service: 7/2/2025     Tentative placement recommendation: Home with Home Health Physical Therapy   Equipment recommendation: bariatric /heavy duty wheeled walker and bariatric/heavy duty bedside commode   girth 61in and sitting hip width 29in      Evaluating Physical Therapist: Ramon Sheehan, PT  #[id number]     Specific Provider Orders/Date/Referring Provider :  PT eval and treat  Start:  07/02/25 1445,   End:  07/02/25 1445,   ONE TIME,   Standing Count:  1 Occurrences,   R       Loyd Barrett Jr., MD    Admitting Diagnosis:   Primary osteoarthritis of right knee [M17.11]  Post-operative state [Z98.890]   Visit diagnosis:     Patient Active Problem List   Diagnosis    Cervical radiculopathy    DDD (degenerative disc disease), lumbar    Morbid obesity (HCC)    Arthritis    Asthma    DM (diabetes mellitus) (HCC)    HTN (hypertension)    Pulmonary hypertension (HCC)    Gastroesophageal reflux disease with esophagitis    COPD with acute exacerbation (HCC)    Primary osteoarthritis of right knee    Post-operative state       ASSESSMENT of current deficits: Patient exhibits decreased strength, balance, endurance, range of motion, and pain right knee impairing functional mobility, transfers, gait , gait distance, and tolerance to activity are barriers to d/c and require skilled intervention to address concerns listed above to increase safety and independence at discharge.   Decreased strength, balance and endurance  increases patient's risk for fall.   Patient  needing o2 for downward trending po2 87% on room air.  Pt on 1 Liters of o2 via nasal cannula for activity and po2 >95% througout  Limited session d/t drowsiness however patient is eager to return home with sister tomorrow      PHYSICAL THERAPY  PLAN OF CARE       Physical therapy plan of care is established 
extremity to prevent contractures and maintain precautions   Instruction/training on safe functional mobility/transfer techniques, verbal cues for hand and foot placement throughout session to improve safety.   Instruction/training in use of adaptive equipment for lower body dressing, demo provided prior to use  Instruction/training in lower body dressing techniques   Instruction/training in car transfer technique and seat positioning  Instruction/training in Aquacel dressing and bathing techniques  Instruction/training in use of leg  to improve bed mobility and car transfers     Rehab Potential: Good for established goals.      Patient / Family Goal: return home      Patient and/or family were instructed on functional diagnosis, prognosis/goals and OT plan of care. Demonstrated good understanding.     Eval Complexity: Low    Time In: 7:45 am  Time Out: 8:23 am   Total Treatment Time: 23 minutes      Min Units   OT Eval Low 97165  X  1    OT Eval Medium 61941      OT Eval High 53221      OT Re-Eval 32914            ADL/Self Care 26591 13 1   Therapeutic Activities 65999  10  1   Therapeutic Ex 68511       Orthotic Management 11314       Manual 50734     Neuro Re-Ed 78893       Non-Billable Time        Evaluation Time additionally includes thorough review of current medical information, gathering information on past medical history/social history and prior level of function, interpretation of standardized testing/informal observation of tasks, assessment of data and development of plan of care and goals.        Evaluating OT: Zohra Awad OTR/L; [id number]               
stance phase of gait      Patient response to education:   Pt verbalized understanding Pt demonstrated skill Pt requires further education in this area   Yes Partial Yes       Treatment:  Patient practiced and was instructed in the following treatment:     Therapist educated and facilitated patient on techniques to increase safety and independence with bed mobility, balance, functional transfers, and functional mobility.      Patient  in chair, performed gait and stair training,     Ankle pumps x 10 reps.       Instruction knee extension in bed with kneecap and toes pointing to ceiling    At end of session, patient in bed with alarm call light and phone within reach,   all lines and tubes intact, nursing notified.     Patient would benefit from skilled Home Physical Therapy to improve functional independence and quality of life.       Patient's/ family goals   home      Patient and or family understand(s) diagnosis, prognosis, and plan of care.       Time in  843  Time out 908    Total Treatment Time  25 minutes      CPT codes:    Therapeutic activities (89197)   15 minutes  1 unit(s)  Gait Training (26046) 10 minutes 1 unit(s)    Shanita Maier, [facility] [phone]

## 2025-07-07 DIAGNOSIS — Z98.890 POST-OPERATIVE STATE: ICD-10-CM

## 2025-07-09 LAB — SURGICAL PATHOLOGY REPORT: NORMAL

## 2025-07-14 ENCOUNTER — OFFICE VISIT (OUTPATIENT)
Age: 65
End: 2025-07-14

## 2025-07-14 DIAGNOSIS — Z96.651 HISTORY OF TOTAL KNEE REPLACEMENT, RIGHT: Primary | ICD-10-CM

## 2025-07-14 PROCEDURE — 99024 POSTOP FOLLOW-UP VISIT: CPT | Performed by: ORTHOPAEDIC SURGERY

## 2025-07-14 RX ORDER — OXYCODONE AND ACETAMINOPHEN 5; 325 MG/1; MG/1
1 TABLET ORAL EVERY 6 HOURS PRN
Qty: 28 TABLET | Refills: 0 | Status: SHIPPED | OUTPATIENT
Start: 2025-07-14 | End: 2025-07-21

## 2025-07-14 NOTE — PROGRESS NOTES
Norco [Hydrocodone-Acetaminophen] Itching    Ultram [Tramadol] Itching     Social History     Socioeconomic History    Marital status: Single     Spouse name: Not on file    Number of children: Not on file    Years of education: Not on file    Highest education level: Not on file   Occupational History    Not on file   Tobacco Use    Smoking status: Never    Smokeless tobacco: Never   Vaping Use    Vaping status: Never Used   Substance and Sexual Activity    Alcohol use: No    Drug use: No    Sexual activity: Not Currently   Other Topics Concern    Not on file   Social History Narrative    Not on file     Social Drivers of Health     Financial Resource Strain: Low Risk  (1/2/2025)    Overall Financial Resource Strain (CARDIA)     Difficulty of Paying Living Expenses: Not hard at all   Food Insecurity: Patient Declined (7/2/2025)    Hunger Vital Sign     Worried About Running Out of Food in the Last Year: Patient declined     Ran Out of Food in the Last Year: Patient declined   Transportation Needs: Patient Declined (7/2/2025)    PRAPARE - Transportation     Lack of Transportation (Medical): Patient declined     Lack of Transportation (Non-Medical): Patient declined   Physical Activity: Not on file   Stress: Not on file   Social Connections: Not on file   Intimate Partner Violence: Not on file   Housing Stability: Patient Declined (7/2/2025)    Housing Stability Vital Sign     Unable to Pay for Housing in the Last Year: Patient declined     Number of Times Moved in the Last Year: 0     Homeless in the Last Year: Patient declined     Family History   Problem Relation Age of Onset    Cancer Father     Diabetes Mother     Hypertension Mother      Patient Active Problem List   Diagnosis    Cervical radiculopathy    DDD (degenerative disc disease), lumbar    Morbid obesity (HCC)    Arthritis    Asthma    DM (diabetes mellitus) (HCC)    HTN (hypertension)    Pulmonary hypertension (HCC)    Gastroesophageal reflux disease

## 2025-07-21 DIAGNOSIS — Z96.651 HISTORY OF TOTAL KNEE REPLACEMENT, RIGHT: ICD-10-CM

## 2025-07-21 RX ORDER — OXYCODONE AND ACETAMINOPHEN 5; 325 MG/1; MG/1
1 TABLET ORAL EVERY 6 HOURS PRN
Qty: 28 TABLET | Refills: 0 | Status: SHIPPED | OUTPATIENT
Start: 2025-07-21 | End: 2025-07-24

## 2025-07-21 NOTE — TELEPHONE ENCOUNTER
Name of Medication(s) Requested:  Requested Prescriptions     Pending Prescriptions Disp Refills    oxyCODONE-acetaminophen (PERCOCET) 5-325 MG per tablet 28 tablet 0     Sig: Take 1 tablet by mouth every 6 hours as needed for Pain for up to 7 days. Intended supply: 7 days. Take lowest dose possible to manage pain Max Daily Amount: 4 tablets       Dosage and directions were verified? Yes    Pharmacy Verified?  Yes

## 2025-07-24 RX ORDER — OXYCODONE AND ACETAMINOPHEN 5; 325 MG/1; MG/1
1 TABLET ORAL EVERY 6 HOURS PRN
Qty: 28 TABLET | Refills: 0 | Status: SHIPPED | OUTPATIENT
Start: 2025-07-24 | End: 2025-07-31

## 2025-07-30 DIAGNOSIS — Z96.651 HISTORY OF TOTAL KNEE REPLACEMENT, RIGHT: Primary | ICD-10-CM

## 2025-07-31 DIAGNOSIS — Z96.651 HISTORY OF TOTAL KNEE REPLACEMENT, RIGHT: ICD-10-CM

## 2025-08-01 PROBLEM — Z98.890 POST-OPERATIVE STATE: Status: RESOLVED | Noted: 2025-07-02 | Resolved: 2025-08-01

## 2025-08-01 RX ORDER — OXYCODONE AND ACETAMINOPHEN 5; 325 MG/1; MG/1
1 TABLET ORAL EVERY 6 HOURS PRN
Qty: 28 TABLET | Refills: 0 | Status: SHIPPED | OUTPATIENT
Start: 2025-08-01 | End: 2025-08-08

## 2025-08-05 DIAGNOSIS — Z96.651 HISTORY OF TOTAL KNEE REPLACEMENT, RIGHT: ICD-10-CM

## 2025-08-07 RX ORDER — OXYCODONE AND ACETAMINOPHEN 5; 325 MG/1; MG/1
1 TABLET ORAL EVERY 6 HOURS PRN
Qty: 28 TABLET | Refills: 0 | Status: SHIPPED | OUTPATIENT
Start: 2025-08-07 | End: 2025-08-14

## 2025-08-14 DIAGNOSIS — Z96.651 HISTORY OF TOTAL KNEE REPLACEMENT, RIGHT: ICD-10-CM

## 2025-08-14 RX ORDER — OXYCODONE AND ACETAMINOPHEN 5; 325 MG/1; MG/1
1 TABLET ORAL EVERY 8 HOURS PRN
Qty: 21 TABLET | Refills: 0 | Status: SHIPPED | OUTPATIENT
Start: 2025-08-14 | End: 2025-08-21

## 2025-08-21 DIAGNOSIS — Z96.651 HISTORY OF TOTAL KNEE REPLACEMENT, RIGHT: Primary | ICD-10-CM

## 2025-08-21 RX ORDER — OXYCODONE AND ACETAMINOPHEN 5; 325 MG/1; MG/1
1 TABLET ORAL EVERY 8 HOURS PRN
Qty: 21 TABLET | Refills: 0 | Status: SHIPPED | OUTPATIENT
Start: 2025-08-21 | End: 2025-08-28

## 2025-08-25 ENCOUNTER — OFFICE VISIT (OUTPATIENT)
Age: 65
End: 2025-08-25

## 2025-08-25 DIAGNOSIS — Z96.651 HISTORY OF TOTAL KNEE REPLACEMENT, RIGHT: Primary | ICD-10-CM

## 2025-08-25 PROCEDURE — 99024 POSTOP FOLLOW-UP VISIT: CPT | Performed by: ORTHOPAEDIC SURGERY

## 2025-08-25 RX ORDER — DICLOFENAC SODIUM 75 MG/1
75 TABLET, DELAYED RELEASE ORAL 2 TIMES DAILY
Qty: 60 TABLET | Refills: 3 | Status: SHIPPED | OUTPATIENT
Start: 2025-08-25

## 2025-08-29 DIAGNOSIS — Z96.651 HISTORY OF TOTAL KNEE REPLACEMENT, RIGHT: Primary | ICD-10-CM

## 2025-08-29 RX ORDER — OXYCODONE AND ACETAMINOPHEN 5; 325 MG/1; MG/1
1 TABLET ORAL EVERY 8 HOURS PRN
Qty: 21 TABLET | Refills: 0 | Status: SHIPPED | OUTPATIENT
Start: 2025-08-29 | End: 2025-09-05

## (undated) DEVICE — VALVE SUCTION AIR H2O HYDR H2O JET CONN STRL ORCA POD + DISP

## (undated) DEVICE — KIT BEDSIDE REVITAL OX 500ML

## (undated) DEVICE — CONTAINER SPEC COLL 960ML POLYPR TRIANG GRAD INTAKE/OUTPUT

## (undated) DEVICE — MEDI-VAC NON-CONDUCTIVE SUCTION TUBING: Brand: CARDINAL HEALTH

## (undated) DEVICE — LUBRICANT SURG JELLY ST BACTER TUBE 4.25OZ

## (undated) DEVICE — MEDI-VAC YANKAUER SUCTION HANDLE W/BULBOUS TIP: Brand: CARDINAL HEALTH

## (undated) DEVICE — MASK,FACE,MAXFLUIDPROTECT,SHIELD/ERLPS: Brand: MEDLINE

## (undated) DEVICE — ADAPTER CLEANING PORPOISE CLEANING

## (undated) DEVICE — KENDALL 450 SERIES MONITORING FOAM ELECTRODE - RECTANGULAR SHAPE ( 3/PK): Brand: KENDALL

## (undated) DEVICE — SPONGE GZ 4IN 4IN 4 PLY N WVN AVANT

## (undated) DEVICE — GOWN ISOLATN REG YEL M WT MULTIPLY SIDETIE LEV 2

## (undated) DEVICE — FORCEPS BX L240CM JAW DIA2.8MM L CAP W/ NDL MIC MESH TOOTH

## (undated) DEVICE — BLOCK BITE 60FR CAREGUARD

## (undated) DEVICE — 6 X 9  1.75MIL 4-WALL LABGUARD: Brand: MINIGRIP COMMERCIAL LLC